# Patient Record
Sex: MALE | Race: WHITE
[De-identification: names, ages, dates, MRNs, and addresses within clinical notes are randomized per-mention and may not be internally consistent; named-entity substitution may affect disease eponyms.]

---

## 2018-09-27 ENCOUNTER — HOSPITAL ENCOUNTER (EMERGENCY)
Dept: HOSPITAL 46 - ED | Age: 68
Discharge: HOME | End: 2018-09-27
Payer: MEDICARE

## 2018-09-27 VITALS — HEIGHT: 69 IN | WEIGHT: 175 LBS | BODY MASS INDEX: 25.92 KG/M2

## 2018-09-27 DIAGNOSIS — E86.0: ICD-10-CM

## 2018-09-27 DIAGNOSIS — E11.65: Primary | ICD-10-CM

## 2018-09-27 DIAGNOSIS — Z79.899: ICD-10-CM

## 2018-09-27 NOTE — EKG
[facility]
                                    [address]
                                  [place]  [address]
_________________________________________________________________________________________
                                                                 Signed   
 
 
Normal sinus rhythm
Prolonged QT
Abnormal ECG
No previous ECGs available
Confirmed by ELLIOT BANG MD ([id number]) on 9/27/2018 2:27:28 PM
 
 
 
 
 
 
 
 
 
 
 
 
 
 
 
 
 
 
 
 
 
 
 
 
 
 
 
 
 
 
 
 
 
 
 
 
 
 
 
 
    Electronically Signed By: ELLIOT BANG MD  09/27/18 1427
_________________________________________________________________________________________
PATIENT NAME:     ZEUS GRANADO              
MEDICAL RECORD #: [id number]                     Electrocardiogram             
          ACCT #: [id number]  
DATE OF BIRTH:   [birth year]                                       
PHYSICIAN:   ELLIOT BANG MD           REPORT #: [id number]
REPORT IS CONFIDENTIAL AND NOT TO BE RELEASED WITHOUT AUTHORIZATION

## 2018-10-04 ENCOUNTER — HOSPITAL ENCOUNTER (EMERGENCY)
Dept: HOSPITAL 46 - ED | Age: 68
Discharge: HOME | End: 2018-10-04
Payer: MEDICARE

## 2018-10-04 VITALS — BODY MASS INDEX: 25.92 KG/M2 | HEIGHT: 69 IN | WEIGHT: 175 LBS

## 2018-10-04 DIAGNOSIS — R03.1: Primary | ICD-10-CM

## 2018-10-04 DIAGNOSIS — I10: ICD-10-CM

## 2018-10-04 DIAGNOSIS — Z79.82: ICD-10-CM

## 2018-10-04 DIAGNOSIS — Z79.899: ICD-10-CM

## 2018-10-04 DIAGNOSIS — Z87.891: ICD-10-CM

## 2018-10-04 PROCEDURE — G0480 DRUG TEST DEF 1-7 CLASSES: HCPCS

## 2018-10-04 NOTE — XMS
PreManage Notification: ZEUS GRANADO MRN:[id number]
 
Security Information
 
Security Events
No recent Security Events currently on file
 
 
 
CRITERIA MET
------------
- [facility] - 2 Visits in 30 Days
 
 
CARE PROVIDERS
-------------------------------------------------------------------------------------
LORE WELCH     Student in an Organized Health Care             09/27/2018-Current
                   Education/Training Program
 
PHONE: Unknown
-------------------------------------------------------------------------------------
 
Jordana has no Care Guidelines for this patient.
Care History
Medical/Surgical
09/27/2018    [facility]
 
      - Patient is currently established with [facility]. If patient is seen 
      in the ED during business hours. Please contact CHWs at [facility].
      - PATIENT CURRENTLY HAS [place] INSURANCE AND IS RECEIVING REFILLS ON HIS 
      MEDICATIONS FROM THE PHARMACY IN [place]. FAXED [phone].
      - PATIENT WOULD BE OVER INCOME FOR MEDICAID BENEFITS ONLY ELIGIBLE FOR 
      MEDICARE PART B HELP. SECONDARY INSURANCE WOULD HAVE TO BE REVIEWED WITH [employer]
      AND PATIENT WOULD HAVE TO SIGN UP FOR THE INSURANCE! PATIENT HAS BEEN MADE
      AWARE OF THIS AND IS TO BE IN CONTACT WITH HIS  AT [facility] OFFICE.
      Care Recommendation: This patient has had 5 or more Emergency Department
      visits in the last 12 months.\T\nbsp; Patient requires education on the scope
      and purpose of the ED as an acute care provider not a Primary Care Provider
      and should not be utilized for chronic conditions.\T\nbsp; These are
      guidelines and the provider should exercise clinical judgment when providing
      care.
E.D. VISIT COUNT (12 MO.)
-------------------------------------------------------------------------------------
2 [facility]
-------------------------------------------------------------------------------------
TOTAL 2
-------------------------------------------------------------------------------------
NOTE: Visits indicate total known visits.
 
ED/UCC VISIT TRACKING (12 MO.)
-------------------------------------------------------------------------------------
10/04/2018 17:28
[facility] [place]
 
TYPE: Emergency
 
COMPLAINT:
- CONFUSED
-------------------------------------------------------------------------------------
 
09/27/2018 10:01
[facility] [place]
 
TYPE: Emergency
 
COMPLAINT:
- LOW BLOOD PRESSURE
 
DIAGNOSES:
- Dehydration
- Other long term (current) drug therapy
- Nonspecific low blood-pressure reading
- Type 2 diabetes mellitus with hyperglycemia
-------------------------------------------------------------------------------------
 
 
INPATIENT VISIT TRACKING (12 MO.)
No inpatient visits to display in this time frame
 
[email or web address]

## 2019-10-16 NOTE — EKG
[facility]
                                    [address]
                                  [place]  [address]
_________________________________________________________________________________________
                                                                 Signed   
 
 
Ventricular-paced rhythm
Abnormal ECG
When compared with ECG of 27-SEP-2018 10:21,
Electronic ventricular pacemaker has replaced Sinus rhythm
Confirmed by ELLIOT BANG MD ([id number]) on 10/16/2019 7:16:12 PM
 
 
 
 
 
 
 
 
 
 
 
 
 
 
 
 
 
 
 
 
 
 
 
 
 
 
 
 
 
 
 
 
 
 
 
 
 
 
 
 
    Electronically Signed By: ELLIOT BANG MD  10/16/19 1916
_________________________________________________________________________________________
PATIENT NAME:     ZEUS GRANADO KEYA              
MEDICAL RECORD #: [id number]                     Electrocardiogram             
          ACCT #: [id number]  
DATE OF BIRTH:   [birth year]                                       
PHYSICIAN:   ELLIOT BANG MD           REPORT #: [id number]
REPORT IS CONFIDENTIAL AND NOT TO BE RELEASED WITHOUT AUTHORIZATION

## 2019-10-16 NOTE — NUR
PT REPORT RECIEVED, CARE ASSUMED AT THIS TIME. PT HAS INSULIN DRIP AND IV
FLUIDS INFUSING. REPORTS NERVE PAIN IN BOTH LEGS. DISCUSSED PLAN OF CARE FOR
THE NIGHT. ASSISTED PT WITH USE OF URINAL IN BED. PT IS ALERT AND ORIENTED AT
THIS TIME. UNDERSTANDS USE OF CALL LIGHT. BED ALARM ON FOR SAFETY.

## 2019-10-17 NOTE — NUR
PT REPORTS DINNER ARRIVED COLD, KITCHEN NOTIFIED AND AGREE TO SEND NE DINNER
TRAY. FRESH ICE WATER IN REACH. PT DNEIES FURTHER NEEDS OR CONCERNS.

## 2019-10-17 NOTE — NUR
DINNER TRAY ARRIVED, CALL LIGHT AND H2O IN REACH. PT SITTING UP EATING DINNER.
NO OTHER NEEDS OR CONCERNS VOICED.

## 2019-10-17 NOTE — NUR
PATIENT BLOOD SUGAR , 15 MINUTES AFTER D50 WAS GIVEN. DR BANG NOTIFIED
VIA TELEPHONE. ORDERS RECEIVED ( VIEW EMAR). WILL CONTINUE TO ASSESS AND
MONITOR.

## 2019-10-17 NOTE — NUR
PT C/O FEELING DIAPHORETIC. BG CHECKED, 40. 50ML D50 GIVEN PER MAR. NUNU JACQUES
NOTIFIED. WILL CONT TO MONITOR AND ASSESS

## 2019-10-17 NOTE — NUR
PATIENT HAS VOIDED ONCE PER URINAL IN BED AND HAD PASTY FOUL SMELLING BM'S X2
SINCE SHIFT STARTED AT 1900. PATIENT HAVING NO PAIN IV'S SL AND WNL. PM MEDS
GIVEN, NEW ATTENDS ON. CALL LIGHT IN REACH.

## 2019-10-17 NOTE — NUR
PT SITTING UP IN BED EATING BREAKFAST AND TALKING WITH ENVIRONMENTAL SERVICES
PERSONELL. PT REPORTS HE DOES NOT WANT TO GET UP TO THE CHAIR FOR BREAKFAST,
PREFERS TO EAT IN BED. PT IV SITES ARE INTACT, NO REDNESS OR SWELLING NOTED,
FLUIDS AND FLUSHES INFUSE EASILY. PT DENIES NAUSEA, PAIN, AND SOB AT THIS
TIME.

## 2019-10-17 NOTE — NUR
PT RESTING IN SEMIFOWLERS POSITION IN BED. PT ALERT AND ORIENTED. CALL LIGHT
AND H2O IN REACH. ASSESSMENT COMPLETED. PT EATING SMALL FRUIT PLATE/COTTAGE
CHEESE SNACK AND DENIES FURTHER NEEDS OR CONCERNS.

## 2019-10-17 NOTE — NUR
IN TO ASSESS PATIENT. STATES FEELING LESS SHAKEY. ALERT AND ORIENTED X 4. ABLE
TO ANSWER QUESTIONS APPROPRIATELY. ASSISTED WITH REPOSITIONING IN STRETCHER,
CALL LIGHT WITHIN REACH, NO FURTHER NEEDS AT THIS TIMW WILL CONTINUE TO
MONITOR.
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
## 2019-10-17 NOTE — NUR
AFTER I DID HIS 1800 VITALS HE NEEDED TO USE THE BED PAN. AFTER HE WAS DONE I
CHANGED HIS OLD TAPED ATTEND AND PUT A CLEAN ONE ON. I ASKED THE NURSING
STUDENT TO PLEASE BRING ME TWO GLASSES OF CRANBERRY JUICE.

## 2019-10-17 NOTE — NUR
SPOKE WITH PATIENT IN ROOM.  WHEN TALKING WITH PATIENT HE INTERRUPTED MY
QUESTIONS THREE TIMES STATING "I CAN LEAVE ANYTIME, I DON'T HAVE TO STAY IF
THE DR DOESN'T LET ME GO".  TRIED TO ADDRESS THIS, BUT PATIENT KEPT SAYING IT.
DR BANG CAME INTO THE ROOM SO I LEFT FOR HIS EXAM.  WILL RETURN ANOTHER TIME.

## 2019-10-17 NOTE — NUR
IV SITES INTACT, NO SWELLING OR REDNESS NOTED, PT DENIES PAIN WITH FLUSH. PT
IS ALERT AND ORIENTED X4, VITALS ARE WNL. PT HAS A VISITIOR AT THE BEDSIDE.

## 2019-10-17 NOTE — NUR
PATIENT RESTING IN BED WATCHIING TV. GIVEN NEW ICE WATER. AND SOME SUGAR FREE
ICE CREAM. PATIENT HAD NO OTHER NEEDS AND HIS CALL LIGHT IS IN REACH.

## 2019-10-17 NOTE — NUR
PT RESTING IN BED, SAWYER GORDON REQUESTED I CHECK ON PT. HAD QUITE A DISCUSSION
WITH PT, HIS FAMILY, PERSONAL MEDICAL ISSUES AND THAT HE IS NOT STAYING ANY
LONGER THAT HE FEELS HE SHOULD. PT REQUESTED PRAYER, WILL FOLLOW AS NEEDED

## 2019-10-17 NOTE — NUR
PATIENT NOW ON MED SURG.  HE WAS HUNGRY ASKING FOR A SNACK AT 3:20 PM.  I
HELPED HIM ORDER A SNACK SINCE HE CANNOT SEE THE MENU.  HE HAS NO TEETH.
COTTAGE CHEESE AND FRUIT PLATE ORDERED BY NURSE AFTER ASKING IF PATIENT COULD
HAVE A SNACK DUE TO BEING DIABETIC.  I ALSO HELPED PATIENT ORDER HIS DINNER
TONIGHT AND TOMORROW'S BREAKFAST.  60 GRAM CONSISTENT CARB DIET.  STATES HE
DOESN'T EAT MUCH BREAD AND EATS BROWN RICE, SO HE HAS SOME KNOWLEDGE ABOUT
EATING TO MANAGE DIABETES.  ADMISSION SCREENING NOTES HE HAS HAD WEIGHT LOSS.
MAY NEED MORE HELP AT HOME OR NEED AN ASSISTED LIVING FACILITY SINCE HE IS
WHEELCHAIR BOUND AND LIVES ALONE.  WILL CONTINUE TO MONITOR.

## 2019-10-18 NOTE — NUR
PT EDUCATED ON INCREASING ROM AND EXERCISING TO MAINTAIN AND IMPROVE STRENGTH.
PT VERBALIZED UNDERSTANDING AND STATES HE HAS BEEN WORKING WITH P.T. AND
WILL CONTINUE EXERCISES AT HOME AS INSTRUCTED PER PHYSICAL THERAPY. CALL LIGHT
AND H2O IN REACH. IV'S REMOVED CATH INTACT, COBAN AND GUAZE APPLIED AND VS'S
COMPLETED AND STABLE. NO FURTHER NEEDS OR CONERNS VOICED.

## 2019-10-18 NOTE — NUR
PATIENT JUST ON THE BED BRAVO ASSISTED BY ROSS FERNANDEZ AND HAD A SMALL AMOUNT OF
STOOL. IN BED WATCHING TV AT THIS TIME. CALL LIGHT IN REACH.

## 2019-10-18 NOTE — NUR
PT RESTING SUPINE IN BED EYES CLOSED AND RESPIRATIONS EVEN AND UNLABORED. CALL
LIGHT AND H2O IN REACH. PT APPEARS TO BE SLEEPING COMFORTABLY. REPORT
RECEIVED FROM SAWYER SOTO.

## 2019-10-18 NOTE — NUR
LEATHA AND I MARYANA HIM FROM THE BED TO THE SHOWER CHAIR. WE GAVE HIM A
SHOWER. CHANGED HIS BED LINENS.

## 2019-10-18 NOTE — NUR
PATIENT HAS REMAINED AWAKE ALL NIGHT WATCHING TV AND ASKING FOR SNACKS,
PATIENT HAS HAD A FREQUENT MORA OF THE BED BAN FOR AN URGENT FEELING OF NEEDING
TO HAVE A BM AND FOR USING THE URINAL. PATIENT'S WHEELCHAIR WAS DELIVERD TO
HIS ROOM LAST NIGHT BY HIS FRIENDS AND HE PLANS TO LEAVE TODAY IF THE MD
THINKS HE IS READY. HE SAYS HE WILL LEAVE AMA IF THE MD DOS NOT DC HIM TODAY.
CALL LIGHT IN REACH AND PATIENT CONTINUES TO WATCH TV.

## 2019-12-03 NOTE — EKG
[facility]
                                    [address]
                                  [place]  [address]
_________________________________________________________________________________________
                                                                 Signed   
 
 
Ventricular-paced rhythm
Abnormal ECG
When compared with ECG of 16-OCT-2019 14:10,
Vent. rate has increased BY  10 BPM
Confirmed by CAROLINA VARGAS DO ([id number]) on 12/3/2019 7:46:08 PM
 
 
 
 
 
 
 
 
 
 
 
 
 
 
 
 
 
 
 
 
 
 
 
 
 
 
 
 
 
 
 
 
 
 
 
 
 
 
 
 
    Electronically Signed By: CAROLINA VARGAS DO  12/03/19 1946
_________________________________________________________________________________________
PATIENT NAME:     ZEUS GRANADO              
MEDICAL RECORD #: [id number]                     Electrocardiogram             
          ACCT #: [id number]  
DATE OF BIRTH:   [birth year]                                       
PHYSICIAN:   CAROLINA VARGAS DO                     REPORT #: [id number]
REPORT IS CONFIDENTIAL AND NOT TO BE RELEASED WITHOUT AUTHORIZATION
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
## 2019-12-03 NOTE — NUR
PATIENT'S DAUGHTER IN THE ROOM. PATIENT PROVIDED VERAL CONSENT FOR PICTURES TO
BE TAKEN OF WOUNDS ON LEFT FOOT. WOUNDS CLEANED AND XEROFORM APPLIED AND
COVERED WITH GAUZE. OPEN AREA ON UMBILICUS CLEANED AND COVERED WITH BACITRACIN
AND A BANDAID. OPEN AREAS ON PATIENTS HEAD CLEANED. RED AREAS IN GROIN AND
BUTTOCK CREVICE CLEANED AND BARRIER CREAM APPLIED. PATIENT TOLERATED WOUND
CARE WELL.

## 2019-12-03 NOTE — NUR
69 YR OLD MALE PATIENT ADMITTED TO CCU FROM ED VIA STRETCHER UNDER DR. VARGAS
WITH DX OF GI BLEED. ELEVATED TRIPONIN. HX OF DM.  UPON ADMIT PATIENT VOMITED
LARGE AMT OF DARK STOMACH CONTENTS. ADMISSION PROCESS STARTED AFTER BED LINEN
CHANGED.

## 2019-12-03 NOTE — NUR
PATIENT ORIENTED BUT DROWSEY. MOANING AND CUSSING CONSISITENTLY WHILE IN BED.
REPORTS "NERVE PAIN" IN HIS LEGS. STATES "THEY ALWAYS HURT" AND DOES NOT DO
ANYTHING SPECIFIC FOR THE PAIN AT HOME. SCDs REMOVED, PATIENT UNABLE TO
TOLERATE THEM. MD AWARE. PATIENT VS STABLE. -110 AT REST. PATIENT HAS
PERSISTENT NAUSEA, NO EMESIS. PATIENT TAKING SIPS OF WATER. PRN ZOFRAN PER
ORDER. DISCUSSED WITH MD, ORDERS FOR SECONDARY PRN FOR NAUSEA RECEIVED.
PATIENT UP TO [facility]. TRANSFERS FROM BED TO BSC WITH MINIMAL ASSIST. CONTINUES TO
BE UNSTEADY. ABLE TO VOID AND RETURNED TO BED. ALLOWED TO REST AT THIS TIME.

## 2019-12-03 NOTE — NUR
PATIENT REPORTING ONGOING NAUSEA AND HAD SOME DRY HEAVES. PATIENT IS RESTLESS
AND AGITATED. ENCOURAGED PATIENT TO REST. PATIENT REQUESTING TRAZADONE. PRN
PHENERGAN ALSO PROVIDED, INFUSED OVER 10MIN DILUTED IN 20ML NS. IV SITE WNL.
PATIENT ATTEMPTS TO VOID IN URNAL AND IS UNABLE TO.

## 2019-12-03 NOTE — NUR
PATIENT REQUEST TO USE THE URNAL. ASSISTED PATIENT UP TO BEDSIDE. PATIENT
UNABLE TO VOID. IS UNSTEADY AND DROWSEY. SPEECH IS SLURRED. ENCOURAGED PATIENT
BACK INTO BED. WARM BLANKET PER REQUEST. IV FLUIDS PER ORDER, SITE WNL.

## 2019-12-04 NOTE — NUR
12/04/19 1011 Sheets,Harini
0920 PT ARRIVED TO PACU PT SLIGHTLY REACTIVE TO PAINFUL STIMULI, JAW
THRUST NEEDED OFF AND ON TO MAINTAIN AIRWAY. O2 SAT 78% AND NC PLACED
AT 6L. O2 INCREASING. RESP RATE INCREASED.
 
0940 PT MAINTAINING OWN AIRWAY AND SLIGHTLY MORE REACTIVE AND MOVING
IN BED. BASELINE FROM CCU PER CRNA. NC PLACED AT 2L FOR TRANFER.
 
0945 REPORT GIVEN TO CCU RN. THREE RNS AT BEDSIDE.

## 2019-12-04 NOTE — NUR
UP TO COMMODE WITH TOTAL ASSIST. NO BM THEN TRANSFERRED TO CHAIR FOR CLEAR
LIQUIDS. FEED PATIENT CLEAR LIQUIDS.

## 2019-12-04 NOTE — NUR
PATIENT HAS BECOME A 1:1 DUE TO SEVERE RESTLESSNESS AND HALLUCINATIONS.
CIWA PERFORMED, SCORE 25.  NOTIFIED. CIWA PROTOCOL ORDERED.

## 2019-12-04 NOTE — NUR
PT PICKING AT IV SITE AND TALKING TO SELF. RN IN ROOM TO ASSESS PT. PT
DISORIENTED, PT CHANGES CONVERSATION FROM BREAKFAST FOOD TO WINE, AND THEN
STARTS TALKING ABOUT ROOMS FOR RENT. PT DISORIENTED TO DATE AND EVENT. DENIES
HALLUCINATIONS. IV SITE SECURED AND PT REORIENTED. CALL LIGHT WITHIN REACH.

## 2019-12-04 NOTE — NUR
PATIENT CONTINUES TO BE RESTLESS, DISORIENTED, AND HALLUCINATIONS. 1:1 NR AT
BEDSIDE. CIWA SCORE 18. 2MG ATIVAN PER PROTOCOL.

## 2019-12-04 NOTE — NUR
PATIENT HAS BEEN YELLING OUT FREQUENTLY. ATTEMPTING TO GET UP TO BSC. PATIENT
VERY UNSTABLE AFTER RECEIVEING TRAZADONE. PATIENT UNABLE TO VOID IN URNAL.
BLADDER SCAN FOR >200 MLS.
PATIENT AGITATED AND PULLING AT LINES. DIFFICULT TO REDIRECT. ENCOURAGED
PATIENT TO REST.

## 2019-12-04 NOTE — NUR
In for cm assessment.  Very difficult to get information from pt. as he keeps
his eyes shut and does not respond until question is repeated many times.
Daughter is coming in later and I will speak with her.

## 2019-12-04 NOTE — NUR
PATIENT MORE RELAXED. DIFFICULT TO OBTAIN CIWA. PATIENT ONLY MILDLY RESTLESS
AND APPEARS TO BE SLEEPING OFF AND ON. 1:1 FOR SAFETY AND TO PROTECT LINES. VS
STABLE.

## 2019-12-04 NOTE — NUR
PT CALLING OUT, RN IN ROOM TO ASSESS NEEDS. PT REQUESTING CHANNEL CHANGE.
EDUCATION ON CALL LIGHT USE.

## 2019-12-04 NOTE — NUR
PT CALLING OUT FOR BED BRAVO. RN IN ROOM AND BED PAN PLACED. PT UNABLE TO HAVE
BM. BED BRAVO REMOVED. DENIES OTHER NEEDS.

## 2019-12-04 NOTE — NUR
PT CALLING OUT, RN IN ROOM TO ASSESS. PT NOTED TO BE INCONTINENT OF BLACK SEMI
LIQUID STOOL. LINEN CHANGED, ATTEND PLACED, AND NEW GOWN APPLIED. PT CONTINUES
TO TALK TO SELF AND REMAINS DISORIENTED, REORIENTED. DENIES OTHER NEEDS. CALL
LIGHT WITHIN REACH.

## 2019-12-04 NOTE — NUR
PT IS SEEN FOR MD REFFERAL FOR PRESSURE INJURIES ON THE LEFT FOOT. THE LATERAL
HEEL WOUND MEASURES 4CM X 5.1CM X 0.1CM AND IS CLASSIFIED AS A STAGE 3
PRESSURE INJURY. THERE IS AN ISLAND OF HEALTHY TISSUE FROM 10-11 O'CLOCK
MEASURING 1CM X 2CM, THERE IS ALSO ADHERENT NECROTIC TISSUE  IN THE CENTER OF
THE WOUND BED AND AT 7 O'CLOCK. THE EDGES OF THE WOUND ARE PROLIFERATIVE WITH
NO UNDERMINING OR TUNNELING NOTED. THERE IS MINIMAL EXUDATE. THERE ARE NO
SIGNS OF INFECTION. THE PERIWOUND SKIN IS FLAKY, BUT INTACT. PT EXPERIENCES NO
PAIN WITH CLEANING/DEBRIDEMENT OF THE WOUND BED. THE AREA IS CLEANSED WITH
BOTH WOUND CLEANSER AND HIBICLENSE SCRUB BRUCH, WHICH REMOVD A LOT OF DEAD
TISSUE. A DEBRISOFT MITT IS ALSO USED TO CLEAN UP SOME OF THE NECROTIC TISSUE
IN THE WOUND BED. IODOSORB IS PUT ON THE NECROTIC TISSUE IN THE WOUND BED,
WHICH IS THEN COVERED WITH AN ADHESIVE FOAM, FOLLOWING THE APPLICATION OF SKIN
PREP.
 
THE LEFT BIG TOE IS AN UNSTAGEABLE PRESSUE INJURY WITH DRY, INTACT ESCHAR. IT
MEASURES 1.6CM X 1CM. THE EDGES ARE NON-PROLIFERATIVE AND NO SIGNS OF
UNDERMINING OR TUNNELING AT THIS TIME. THERE IS NO EXUDATE. THE PERIWOUND SKIN
IS FLAKY, BUT INTACT. NO SIGNS OF INFECTION. PT HAS NO PAIN WITH CARE.
MEDIHONEY ALGINATE IS PLACED ON THE NECROTIC TISSUE, WHICH IS THEN COVERED
WITH AN ADHESIVE FOAM.
 
THE LATERAL UMBILICUS MEASURES 0.9CM X 1.9CM X 0.1CM. IT IS A PARTIAL
THICKNESS WOUND, THE ETIOLOGY IS UNKNOWN, THE PT IS NOT HELPFUL WITH ANSWERING
QUESTIONS. THE BASE IS PALE PINK, WITH CLEAN NON-GRANULATING TISSUE. THE EDGES
ARE PROLIFERATIVE, NO UNDERMINING OR TUNNELING NOTED. THERE IS DRIED CRUSTY
EXUDATE ON THE PERIWOUND SKIN BEFORE CLEANSING. THE PERIWOUND SKIN IS INTACT.
NO PAIN WITH THIS WOUND EITHER. NO SIGNS OF INFECTION. COLLAGEN IS PLACED IN
THE WOUND BED, THEN COVERED WITH AN ADHESIVE FOAM.
 
WOUND CARE PLANS ARE WRITTEN OUT AND PLACED INTO PT'S CHART.

## 2019-12-04 NOTE — NUR
PT REPORTS HEARING AND SEEING DOGS IN ROOM. REPORTS "SEEING PEOPLE COMING AND
GOING." PT STATES, "I WOULD SURE LIKE SOME PINK DRINK, YOU KNOW EVELIO." PT
REPORTS NIGHTLY WINE USE. REPORTS "CRAVING ALCOHOL." MILD TREMOR NOTED. CIWA
COMPLETED AND NOTED TO BE 11. 2MG ATIVAN GIVEN. WILL CONTINUE TO MONITOR.

## 2019-12-04 NOTE — NUR
PATIENT IS VERY RESTLESS. DISORIENTED FRQUENTLY AND DIFFICULT TO REORIENT.
BLADDER SCAN >400. HEMPHILL PLACED. 550 MLS URINE DRAINED. PATIENT TRYING TO ROLL
OUT OF BED AND REQUIRES FREQUENT INTERVANTION. 1:1 FOR SAFETY.

## 2019-12-04 NOTE — NUR
REPORT RECIEVED. SITTER IS IN ROOM. PATIENT CALM. HEMPHILL CATH IS PATENT WITH
CLEAR YELLOW URINE. IVF PATENT.

## 2019-12-04 NOTE — NUR
PATIENT CONTINUES TO YELL FREQUENTLY. ATEMPTING TO PULL LINES AND GET OUT OF
BED. PATIENT DIFFICULT TO REDIRECT. CONTINUES TO BE UNABLE TO VOID. BLADDER
SCAN 280MLS. PATIENT UNWILLING TO STRAIGHT CATH AT THIS TIME. WILL CONTINUE TO
MONITOR. PRN ATIVAN PROVIDED.
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
## 2019-12-04 NOTE — NUR
UP TO COMMODE TO EXPELL SM LOOSE BLACK STOOL. STRONGER ON LEGS. MORE
INTERACTIVE WITH STAFF. BACK TO BED WITH ASSIST.

## 2019-12-04 NOTE — NUR
PATIENT ON CLEAR LIQUID DIET. BRIEF INTERVIEW THIS AFTERNOON.  HE USUALLY EATS
3 MEALS AT HOME: SMOOTHIE FOR BREAKFAST, MAYBE A FROZEN POTPIE FOR LUNCH, AND
STEAK AND STEAMED VEGGIES FOR DINNER.  DRINKS BLACK COFFEE, WATER, SOMETIMES
MILK.  NO ORAL SUPPLEMENTS AT HOME.  DOESN'T NORMALLY SNACK.  STATES HIS
WEIGHT HAS BEEN STABLE.  DOES HIS OWN GROCERY SHOPPING, TRIES TO GO ONCE A
WEEK.  WILL SEE HOW HE EATS HERE.  MAY NEED ENSURE HIGH PROTEIN (LOWER SUGAR
DRINK) FOR ADDED NUTRITION.  PATIENT HAD TO GO TO THE BATHROOM.

## 2019-12-04 NOTE — NUR
PT DISORIENTED TO DATE AND EVENT, INAPPROPRIATE RESPONSES AT TIMES, REQUESTING
"NIGHT SLEEP MED." PACED RHYTHM WITH HR 'S ON MONITOR. LUNG CLEAR IN
BUL, DIMINISHED IN BLL, NO COUGH, 96% ON ROOM AIR. BOWEL TONES ACTIVE X4,
DENIES NAUSEA. WOUND SITES ASSESSED, DRESSING C/D/I, WNL. HEMPHILL CATH IN PLACE,
SMALL AMOUNT PENILE DISCHARGE NOTED, CATH AND SAMMY CARE COMPLETED. IV SITE
FLUSHED AND PATENT. PM CARE COMPLETED. BLANKETS FROM WARMER. REINFORCED SAFETY
AND FALL PRECAUTIONS, PT UNRECEPTIVE. CALL LIGHT WITHIN REACH.

## 2019-12-04 NOTE — NUR
HAS BEEN SLEEPING WITH BIPAP IN PLACE. OFF NOW FOR BREAK, PT FEELS LIKE THE
BIPAP REALLY HELPED.BREATH TONES REMAIN VERY TIGHT WITH VERY FEW WHEEZES IN
UPPER LEFT POST REGION.
TAKING CLEAR LIQ WITHOUT PROBLEM. VOIDED PER URINAL.

## 2019-12-05 NOTE — NUR
DR VARGAS IN TO CHECK ON PATIENT. DISCUSSED PLAN OF CARE FOR EVENING. DR VARGAS
WOULD LIKE MINIMAL USE OF ATIVAN OVERNIGHT TO HELP PATIENT GET ON A REGULAR
WAKE/ SLEEP SCHEDULE.

## 2019-12-05 NOTE — NUR
PT IN ROOM PULLING OFF GOWN AND PULLING AT IV TUBING. ASSISTED WITH
REDIRECTION, REPOSITIONING, AND GAVE PT A PUDDING. PT NOW RESTING WITH EYES
CLOSED.

## 2019-12-05 NOTE — NUR
AWAKEN PT TO SEE IF HE HAD TO VOID YET AND NO HE DID NOT. PT SAID NO AND SHOOK
HIS HEAD NO. PT ATTENDS IS DRY AT THIS TIME.

## 2019-12-05 NOTE — NUR
PT ASLEEP AT PRESENT TIME. SAWYER SAMUELS REQUESTED I NOT DISTURB PT AT THIS TIME.
WILL FOLLOW AS NEEDED

## 2019-12-05 NOTE — NUR
PT INCOHERENTLY YELLING OUT. RN IN ROOM. PT DISORIENTED, AGGITATED, AND
RESTLESS. EASILY REASSURED. WILL CONTINUE TO MONITOR.

## 2019-12-05 NOTE — NUR
PT RESTLESS AND ATTEMPTING TO GET OUT OF BED STATING "I'M GOING SHOPPING."
DISORIENTED TO ALL. PT REMOVED GOWN AND ATTEMPTING TO EAT, REORIENTED. PT
PICKING AT SELF AND LINES. PT CALLING OUT INCOHERENT SPEECH. APPEARS AGGITATED
AND TREMORS NOTED. ADDITIONAL 4MG ATIVAN GIVEN.

## 2019-12-05 NOTE — NUR
PT CONSTANTLY YELLING OUT AND INCOHERENT. UNABLE TO SETTLE WITH THERAPEUTIC
COMMUNICATION. ATTEMPTING TO GET OUT OF BED. PULLING AT HEMPHILL AND LINES.
ADDITIONAL 4 MG ATIVAN GIVEN. WILL CONTINUE TO MONITOR.

## 2019-12-05 NOTE — NUR
DR PRESCOTT INTO SEE PT AT THIS TIME, LEFT BIG TOE DRESSING CHANGED WITH A DRAIN
SPONGE AND SMALL WRAP AND PAPER TAPE. THE LEFT HEEL DRESSING IS GOOD WITH THE
ALLEVENT AND THE BROWN SPOT ON THE RIGHT TOE IS GOOD LEFT TO AIR AT THIS TIME
DUE TO IT IS DRY.
PT SLEEP THRU THE DRESSING PROCESS WITH DR PRESCOTT.

## 2019-12-05 NOTE — NUR
PT ATTENDS IS CLEAN AND DRY AT THIS TIME. HE DOES PULL AGAINST STAFF WHEN
TRYING TO CHANGE HIS POSITION.

## 2019-12-05 NOTE — NUR
PT CONTIOUES TO SLEEP THIS AM, HE IS AROUSABLE WHEN YOU TOUCH HIS ARM. HE
MOANS AND THEN RETURNS TO SLEEP.

## 2019-12-05 NOTE — NUR
PT PULLING AT HEMPHILL AND LINES, UNABLE TO CALM, UNABLE TO FOLLOW COMMANDS. 2 MG
ATIVAN GIVEN. ASSESSMENT UNCHANGED FROM PREVIOUS.

## 2019-12-05 NOTE — NUR
PT AWAKEN FOR AM CARE, LINE CHANGE AND REPSOITIONED AT THIS TIME. LUNCH ORDER
AND MEDICATIONS GIVEN. PT ANSWERES QUESTION, IS SLOW TO RESPONDE, BUT DOES
FOLLOW DIRECTIONS. PT IS MAKING URINE, HEMPHILL DRAINING WELL.

## 2019-12-05 NOTE — NUR
IN ROOM FOR MEDICATION ADMINISTRATION AND TO ASSESS PATIENT. RESTING IN BED.
AWOKE LONG ENOUGH TO TAKE MEDICATION AND RESPOND TO QUESTIONS THEN FALLS BACK
TO SLEEP. PT DENIES PAIN OR NAUSEA. TWO PERSON ASSIST TO REPOSITION IN BED. IV
FLUIDS INFUSING, CALL LIGHT WITHIN REACH, NO FURTHER NEEDS AT THIS TIME.

## 2019-12-05 NOTE — NUR
PT YELLING OUT FREQUENTLY. DISORIENTED. UNABLE TO REORIENT. UNABLE TO FOLLOW
COMMANDS. THERAPEUTIC COMMUNICATION PROVIDED. WILL CONTINUE TO MONITOR.

## 2019-12-05 NOTE — NUR
DIABETIC EDUCATOR CAME BY THIS AFTERNOON TO SEE PT, BUT PT HAS BEEN SLEEPING
FOR MOST OF THE DAY AND WHEN AWAKE DOES NOT FOLLOW OR UNDERSTAND WHAT YOU ARE
TALKING ABOUT. SHE WILL COME BACK ON FRIDAY.

## 2019-12-05 NOTE — NUR
PT EX-WIFE IN THE ROOM AT THIS TIME, TALKING WITH HIM. PT WAS FEED LUNCH HE
DID NOT TAKE ANY EFFORT TO FEED SELF.

## 2019-12-05 NOTE — NUR
PT CALLING OUT, RN IN ROOM TO ASSESS. PT DISORIENTED, ATTEMPTING TO PUT GOWN
IN MOUTH, RESPONDS INAPROPRIATELY. PT ATTEMPTING TO GET OUT OF BED AND UNABLE
TO FOLLOW COMMANDS. STATES "I NEED A BAG MY STOMACH IS UPSET." TREMOR NOTED.
PT DIAPHORETIC. PT PICKING AT AIR. CIWA 14 AT THIS TIME. 4 MG ZOFRAN AND 2 MG
ATIVAN GIVEN. WILL CONTINUE TO MONITOR.

## 2019-12-05 NOTE — NUR
PT RESTING WITH EYES CLOSED. BREATHING EVEN AND UNLABORED r=22. IV FLUIDS
INFUSING. BED ALARM ON FOR SAFETY. WILL CONTINUE TO MONITOR.

## 2019-12-05 NOTE — NUR
LEFT HEEL DRESSING CHANGED DUE TO IT HAD COME OFF, ALSO PLACED HEEL PROCTORS
ON PT AT THIS TIME. PT THANKED STAFF FOR HELPING HIM EAT THIS EVENING. PT
STATES "THIS IS THE BEST FOOD THAT I HAVE HAD IN A LONG TIME" PT IS TRACKING
AT THIS TIME.

## 2019-12-06 NOTE — NUR
PATIENT HAS BEEN UP X3 NOW TO USE THE BATHROOM WITH NO RESULTS. UPDATED MD VARGAS THAT PATIENT HAS NOT VOIDED IN 5 HOURS NOW AND HAS TRIED AND IS UNABLE
TO VOID. PER MD VARGAS MAY STRAIGHT CATH PATIENT AND IF HE IS UNABLE TO VOID IN
THE NIGHT TONIGHT AFTER TRYING AGAIN PLACE HEMPHILL CATHETER. NO OTHER NEEDS AT
THIS TIME. WILL CONTINUE TO CLOSELY MONITOR.

## 2019-12-06 NOTE — OR
[facility]
                                    [address], [place]  [address]
_________________________________________________________________________________________
                                                                 Signed   
 
 
DATE OF OPERATION:
12/04/2019
 
SURGEON:
Keya Davis MD
 
PREOPERATIVE DIAGNOSES:
1. Coffee-grounds emesis and melena.
2. Multiple medical problems including diabetes out of control (hyperglycemic coma),
episodic methamphetamine use, hepatitis C, uncertainty regarding cirrhosis. 
 
POSTOPERATIVE DIAGNOSES:
1. 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
2. Small proximal gastric ulcer with visible vessel.
 
PROCEDURE:
Esophagogastroduodenoscopy with control of bleeding (hemoclip application of ulcer).
 
ANESTHESIA:
Propofol infusion (minimal); Roby Payne CRNA.
 
INDICATION:
This 69-year-old white man is a recent transplant from [place] to the [facility] in
the past year.  He has underlying diabetes, which was rather severe.  He has had
admission to the hospital for hyperosmolar coma in the past.  He was admitted to the
emergency room last night, having reported coffee-grounds emesis as well as melena over
several days.  His hematocrit at presentation was 31, now down to 26.  The patient
episodically uses methamphetamine.  He is known to have had history of hepatitis C, but
not certain on cirrhosis.  His INR is 1.3 and he has no ascites.  He is admitted to
undergo upper endoscopy to better characterize the problem of hematemesis and melena.
He understands the risks of bleeding, infection, and perforation. 
 
FINDINGS:
There is no sign of fresh blood within the esophagus, stomach, or duodenum.  There was
mild chronic inflammation of the esophagus and that of the stomach as well.  There was a
punched-out ulcer like lesion of the proximal stomach that had apparently of visible
vessel or clot.  This was secured with a hemoclip.  Biopsies were not obtained.  There
was no sign of other pathology, though a very subtle possible midesophageal grade 1
varix was noted. 
 
DESCRIPTION OF PROCEDURE:
The patient was brought to the endoscopy suite and given topical Hurricaine spray
 
    Electronically Signed By: KEYA DAVIS MD  12/06/19 1620
_________________________________________________________________________________________
PATIENT NAME:     ZEUS GRANADO              
MEDICAL RECORD #: [id number]            OPERATIVE REPORT              
          ACCT #: [id number]  
DATE OF BIRTH:   [birth year]            REPORT #: [id number]      
PHYSICIAN:        KEYA DAVIS MD                 
PCP:              LORE WELCH MD              
REPORT IS CONFIDENTIAL AND NOT TO BE RELEASED WITHOUT AUTHORIZATION
 
 
                                  [facility]
                                    [address], [place]  [address]
_________________________________________________________________________________________
                                                                 Signed   
 
 
hypopharyngeal anesthesia and placed in lateral decubitus position.  He was given
intravenous sedation with the assistance of the anesthetist based on his advanced ASA
class (4E).  He is edentulous.  After sedation with minimal amount of propofol (the
patient already had Ativan earlier today), an Olympus video upper endoscope was passed
in the hypopharynx.  The vocal cords appeared normal.  Scope was advanced to the
esophagus and passage down the esophagus showed no sign of blood or bleeding, though
there was one area that was suggestive of an early varix.  The scope was then advanced
in the stomach, which was insufflated with air.  There was some dark material, but no
sign of active bleeding or blood proper.  Irrigation was undertaken.  Stomach had a mild
inflammatory appearance.  The pylorus was normal.  Scope was passed through into the
duodenum, which was normal.  The scope was withdrawn carefully through the duodenal
channel showing no sign of ulceration.  Retroflexed view showed dark material in the
upper stomach.  The scope was manipulated and irrigated allowing for removal of a
tenacious dark material in the upper stomach identifying a small ulceration.  This
appeared to have a visible vessel or clot at the base.  This was secured with a
hemoclip.  Irrigation was undertaken further.  There was no sign of bleeding.  The scope
was carefully withdrawn through the esophagus, which showed inflammation.  I did not
observe leoncio immanuel varices upon withdrawal of scope.  He was then taken to recovery room
in good condition having suffered no complication. 
 
CONCLUDING DIAGNOSIS:
Most likely, his bleeding is related to a small proximal gastric ulcer.  Relationship to
his episodic methamphetamine use in etiology of the ulcer is acknowledged, but uncertain
at this time.  Recommend treating with Carafate and PPI medication.  He will return to
the ongoing care of Dr. Brown in the hospital. 
 
 
 
            ________________________________________
            MD HAIR Bailey/MODL
Job #:  [id number]
DD:  12/04/2019 09:29:48
DT:  12/04/2019 10:29:03
 
cc:            Carolina Brown MD 
 
               [facility]
 
 
 
    Electronically Signed By: KEYA DAVIS MD  12/06/19 1620
_________________________________________________________________________________________
PATIENT NAME:     ZEUS GRANADO              
MEDICAL RECORD #: [id number]            OPERATIVE REPORT              
          ACCT #: [id number]  
DATE OF BIRTH:   [birth year]            REPORT #: [id number]      
PHYSICIAN:        KEYA DAVIS MD                 
PCP:              LORE WELCH MD              
REPORT IS CONFIDENTIAL AND NOT TO BE RELEASED WITHOUT AUTHORIZATION
 
 
                                  [facility]
                                    [address], [place]  [address]
_________________________________________________________________________________________
                                                                 Signed   
 
 
Copies:  CAROLINA BROWN DO
~
 
 
 
 
 
 
 
 
 
 
 
 
 
 
 
 
 
 
 
 
 
 
 
 
 
 
 
 
 
 
 
 
 
 
 
 
 
 
 
 
 
    Electronically Signed By: KEYA DVAIS MD  12/06/19 1620
_________________________________________________________________________________________
PATIENT NAME:     ZEUS GRANADO              
MEDICAL RECORD #: [id number]            OPERATIVE REPORT              
          ACCT #: [id number]  
DATE OF BIRTH:   [birth year]            REPORT #: [id number]      
PHYSICIAN:        KEYA DAVIS MD                 
PCP:              LORE WELCH MD              
REPORT IS CONFIDENTIAL AND NOT TO BE RELEASED WITHOUT AUTHORIZATION

## 2019-12-06 NOTE — CONS
[facility]
                                    [address], [place]  [address]
_________________________________________________________________________________________
                                                                 Signed   
 
 
DATE OF CONSULTATION:
12/03/2019
 
CONSULTING PHYSICIAN:
Keya Davis MD
 
REQUESTING PHYSICIAN:
Carolina Brown MD
 
PROBLEM:
Possible melena and hematemesis.
 
HISTORY OF PRESENT ILLNESS:
This disheveled 69-year-old white man was evaluated in the emergency room by Dr. Lam
and subsequently admitted to the hospital by Dr. Brown with problems of diabetes out of
control including significant hyperglycemia at 600, known diabetes, but without insulin
treatment in the past few days.  Methamphetamine use and coincidental report of melena
as well as probable hematemesis.  The patient tells me he has had hematemesis in the
past and evaluation for it has been negative. 
 
The patient is here in [place] for the past year, previously in [facility]. 
 
The patient has methamphetamine use in the past 2 days and is considered to carry a
diagnosis of aortic stenosis, __________ which I am uncertain at this time. 
 
MEDICATIONS:
The patient does take aspirin enteric-coated on a daily basis, said to have Pepcid as
longstanding medication, uncertain if he has really been taking it.  Also, taking
lisinopril 5 mg daily, NovoLog insulin 10 units subcu t.i.d. with meals, Vistaril 25 mg
p.o. q.6 hours, Lasix 20 mg tablets 1/2 tab p.o. daily, famotidine 20 mg p.o. daily,
folic acid 1 mg daily, Cymbalta 30 mg p.o. daily, and vitamin D3. 
 
ALLERGIES:
He is considered to have no known drug allergies.   
 
The patient underwent an EKG as his troponin level was 0.06, results of which are
pending, but the patient has no complaints of chest pain.  He does have some complaints
of epigastric pain.  He denies any dysphagia.  He has what sounds like some
coffee-ground emesis and definitely has been thought to have tarry black stool. 
 
SOCIAL HISTORY:
He lives in [place].  He has a woman, who lives in his house, which he does not hold
in high regard and wants her and others within the house hold out of there, he says.
The patient is from [facility] as previously noted. 
 
    Electronically Signed By: KEYA DAVIS MD  12/06/19 1620
_________________________________________________________________________________________
PATIENT NAME:     ZEUS GRANADO              
MEDICAL RECORD #: [id number]            CONSULTATION                  
          ACCT #: [id number]  
DATE OF BIRTH:   [birth year]            REPORT #: [id number]      
PHYSICIAN:        KEYA DAVIS MD                 
PCP:              LORE WELCH MD              
REPORT IS CONFIDENTIAL AND NOT TO BE RELEASED WITHOUT AUTHORIZATION
 
 
                                  [facility]
                                    [address], [place]  [address]
_________________________________________________________________________________________
                                                                 Signed   
 
 
 
REVIEW OF SYSTEMS:
Denies any chest pain though he has had some epigastric pain.  He has had no back pain
per se.  His appetite has been poor __________ and it is unreliable as to the extent to
which he has been eating and definitely unlikely to have been taking his usual
medications. 
 
PHYSICAL EXAMINATION:
GENERAL:  A disheveled white man looking far older than stated age of 69.   
NECK:  Trachea is midline.  There is no carotid bruit. 
CHEST:  Clear. 
HEART:  Regular without murmur. 
ABDOMEN:  Nondistended and soft.  There is no palpable mass.  No ascites.  No
telangiectasias. 
EXTREMITIES:  Lower extremities show excoriated lesions, likely self induced.
 
LABORATORY STUDIES:
Show a white count of 5.7, hematocrit 31.1, platelets 131,000.  Toxicology shows
negative acetones.  Urinalysis is pending.  Chem profile shows normal electrolytes
except for sodium 131, glucose was elevated at 666, calcium 7.7.  Liver enzymes normal.
Troponin initially 0.44, subsequently 0.049.  His albumin is low at 2.6.  Thyroid
studies are pending.  ABG shows a pH of 7.38, CO2 49.8, O2 24, bicarb 28.8, O2
saturation 35.5%.  This is considered a venous blood gas. 
 
ASSESSMENT AND PLAN:
I have been consulted for consideration of upper endoscopy on the basis of his
hematemesis and melena, which is a presumed diagnosis actually.  His hematocrit is
decreased to 31.1, and on that basis he may well have had some bleed bleeding.  He has
numerous other medical problems, all of which require attention and particularly the
hyperosmolar coma with glucose of 666. 
 
The patient is admitted to the intensive care unit for further management.  I discussed
with him the recommendation for upper endoscopy be performed tomorrow.  If he should
"cut loose" with bleeding, it certainly can be done sooner.  The risks of bleeding,
infection, and perforation related to upper endoscopy were reviewed with him.  He
understands and wishes to proceed.  He can have liquids tonight.  N.p.o. after midnight
anticipating upper endoscopy tomorrow.  Whether or not he will need propofol infusional
sedation remains to be determined. 
 
 
 
            ________________________________________
            Keya Davis MD 
 
 
    Electronically Signed By: KEYA DAVIS MD  12/06/19 4790
_________________________________________________________________________________________
PATIENT NAME:     ZEUS GRANADO              
MEDICAL RECORD #: [id number]            CONSULTATION                  
          ACCT #: [id number]  
DATE OF BIRTH:   [birth year]            REPORT #: [id number]      
PHYSICIAN:        KEYA DAVIS MD                 
PCP:              LORE WELCH MD              
REPORT IS CONFIDENTIAL AND NOT TO BE RELEASED WITHOUT AUTHORIZATION
 
 
                                  [address] [place]  [address]
_________________________________________________________________________________________
                                                                 Signed   
 
 
 
/[facility]
Job #:  [id number]
DD:  12/03/2019 18:18:59
DT:  12/03/2019 21:38:36
 
cc:            Carolina Brown MD
 
 
Copies:  CAROLINA BROWN DO
~
 
 
 
 
 
 
 
 
 
 
 
 
 
 
 
 
 
 
 
 
 
 
 
 
 
 
 
 
 
 
 
 
 
 
    Electronically Signed By: KEYA DAVIS MD  12/06/19 1620
_________________________________________________________________________________________
PATIENT NAME:     ZEUS GRANADO              
MEDICAL RECORD #: [id number]            CONSULTATION                  
          ACCT #: [id number]  
DATE OF BIRTH:   [birth year]            REPORT #: [id number]      
PHYSICIAN:        KEYA DAVIS MD                 
PCP:              LORE WELCH MD              
REPORT IS CONFIDENTIAL AND NOT TO BE RELEASED WITHOUT AUTHORIZATION

## 2019-12-06 NOTE — NUR
PATIENT SHIFT REPORT RECIEVED FROM NIGHT SHIFT RN. PATIENT RESTING IN BED AND
CALLED STAFF TO GET OUT OF BED. PER REPORT PATIENT IS CLEARING MORE AS THE
NIGHT GOES. WILL CONTINUE TO CLOSELY MONITOR.

## 2019-12-06 NOTE — NUR
PT REPORT RECIEVED FROM CCU RN. CARE ASSUMED AT THIS TIME. PT RESTING WITH
EYES CLOSED. BREATHING EVEN AND UNLABORED. CALL LIGHT MARY ANNE ESTEVEZ. BED ALARM
ON FOR SAFETY.

## 2019-12-06 NOTE — NUR
PATIENT FINISHED HIS DINNER AND UPDATED ON NEEDING TO STRAIGHT CATH IF PATIENT
CAN NOT VOID. PATIENT STATED I STILL CANT PEE. STRAIGHT CATHED FOR 375MLS
OUTPUT. PATIENT DENIES ANY OTHER NEEDS AT THIS TIME. WILL CONTINUE TO CLOSELY
MONITOR.

## 2019-12-06 NOTE — NUR
WHILE EATING BREAKFAST, PATIENT NOTED TO PUSH TRAY OFF OF HIS TRAY TABLE AND
THROW ALL OF HIS FOOD TOWARDS THE DOOR/GARBAGE. AFTER THIS, PATIENT STATES, "I
NEED MY SAUSAGE, GET ME MORE SAUSAGE." EXPLAINED TO PATIENT THAT HE HAD JUST
THROWN HIS ENTIRE TRAY DOWN ONTO THE FLOOR WITH ALL OF HIS FOOD. PT HAD EATEN
ABOUT 25% OF HIS MEAL PRIOR TO DOING THIS. PT NOW ASKING FOR HIS HEAD OF BED
TO BE LOWERED. PT LOOKS OUT WINDOW AND STATES, "THERE'S SOMEONE OUT THERE
PHEASANT HUNTING, AND I SHOULD BE OUT THERE HUNTING TOO GOD DAMN IT."

## 2019-12-06 NOTE — NUR
PATIENTS DINNER ORDERED. PATIENTS DAUGHTER IN TO SEE PATIENT. UPDATED ON PLAN
OF CARE. PATIENT HAS REMAINED MORE CLEAR THIS AFTERNOON IN COGNITION. NO OTHER
NEEDS AT THIS TIME. ALL BEDDING WAS CHANGED. WARM BLANKET PROVIDED PER
REQUEST. WILL CONTINUE TO CLOSELY MONITOR.

## 2019-12-06 NOTE — NUR
PATIENT RESTING IN BED AND CALLED OUT FOR STAFF ASKING FOR HIS PILLOW TO BED
MOVED. PATIENT UNABLE TRYING TO GET UP BUT PATIENT UNABLE TO VERBALIZE WHAT HE
NEEDS. OFFERED THE PATIENT THE CAMMODE AND PATIENT SAID "YES I NEED TO GO". 2
RN IN TO ASSIST PATIENT UP TO THE CAMMODE. PATIENT URINATEED AND ASSISTED BACK
TO BED. NO OTHER NEEDS AT THIS TIME. BED ALARM ON FOR SAFETY AND BEDDING WAS
CHANGED. WILL CONTINUE TO CLOSELY MONITOR.

## 2019-12-06 NOTE — NUR
PATIENT HAS REMAINED MORE CLEAR THIS AFTERNOON. PATIENT IS ALERT TO PLACE.
PATIENT CALLS APPROPRIATELY. PATIENT HAS BEEN UP SEVERAL TIMES TO [facility] AND
WORKED WITH PHYSICAL THERAPY. PATIENT TOLERATED WELL. NO OTHER NEEDS AT THIS
TIME. WILL CONTINUE TO CLOSELY MONITOR.

## 2019-12-06 NOTE — NUR
PATIENT RESTING AT THIS TIME. BED ALARM IN PLACE DUE TO PATIENT BEING CONFUSED
AND FOR HIS SAFETY. WILL CONTINUE TO CLOSELY MONITOR.

## 2019-12-06 NOTE — NUR
PT STILL UNABLE TO VOID. ASSISTED PT TO THE BSC, BUT PT STILL UNABLE TO
URINATE. STRAIGHT CATHED PT  CC. WELL TOLERATED BY PATIENT. ASSESSMENT
COMPLETED AT THIS TIME. PT COOPERATIVE AND TALKATIVE. MORE AWAKE THAN HE HAS
BEEN THROUGHOUT THE NIGHT.PT ORIENTED TO SELF, PLACE, AND EVENT, AND STATES HE
IS FEELING BETTER. MODERATE AMOUNT OF DRAINAGE FROM LEFT HEEL, BEDDING CHANGED
AND DRESSING REPLACED. CALL LIGHT WITHIN REACH. NO FURTHER NEEDS AT THIS TIME.

## 2019-12-06 NOTE — NUR
PATIENT CALLED AND WAS ASSISTED UP TO THE BEDSIDE CAMMODE. PATIENT IS A 2
PERSON STAND-BY TO STAND. PATIENT HAD SMALL BM AND URINATED. PROVIDED PATIENT
WITH BED BATH AT THIS TIME. ALL BEDDING CHANGED AND NEW GOWN ON. PATIENT IS
ALERT TO PLACE AND SITUATION AT THIS TIME. LUNCH ORDERED. WILL CONTINUE TO
CLOSELY MONITOR. NO OTHER NEEDS AT THIS TIME.

## 2019-12-06 NOTE — NUR
IN ROOM TO ASSESS PATIENT. UP TO BSC WITH FWW AND ONE PERSON ASSIST. PT ABLE
TO URINATE AT THIS TIME. ORAL CARE, SKIN CARE, AND BED LINEN CHANGE COMPLETED.
PT DISORIENTED TO TIME AND LOCATION. ASKS ABOUT GOING TO SCHOOL. ABLE TO
REORIENT PATIENT TO SITUATION. PT RESTING IN BED AT THIS TIME. BED ALARM ON
FOR SAFETY, CALL LIGHT WITHIN REACH. NO FURTHER NEEDS AT THIS TIME.

## 2019-12-06 NOTE — NUR
pt SWEARING, IN TO CHECK ON HIM. REPORTED "I'M COLD" OFFERED A BLANKET "WE
SHOULD JUST GET UP AND DO SOMETHING" REFUSED TO SIT IN CHAIR "I WANT TO SIT IN
MY LITTLE CHAIR." REMINISCED ABOUT CHILDHOOD AND SON FOR SEVERAL MINUTES.
NO FURTHER REQUESTS AT THIS TIME. CALL LIGHT WITHIN REACH. CURTAIN OPEN TO
NURSES STATION.

## 2019-12-06 NOTE — NUR
PATIENT RESTING IN BED AT THIS TIME. PATIENT IS ALERT TO SELF ONLY. ASKED
PATIENT THE YEAR AND PATIENT STATES "2015". PATIENT ASKS STAFF "WHAT GRADE ARE
YOU IN IN COLLEGE". UPDATED PATIENT I AM GRADUATED. PATIENT LATER ASKED STAFF
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 RE-ORIENTED PATIENT THAT HE IS IN THE
HOSPITAL. PATIENT IN BED AT THIS TIME AND BREAKFAST ORDERED. WILL REPOSITION
AS NEEDED. PATIENT DOES MOVE HIS SELF AT TIMES. WILL CONTINUE TO CLOSELY
MONITOR.

## 2019-12-06 NOTE — NUR
pt MOANING. COVERS OFF, COMPLAINED OF BEING COLD. COVERS REARRANGED. LIGHTS
OFF, ENCOURAGED SLEEP AT THIS TIME. CURTAIN OPEN TO NURSES STATION.

## 2019-12-06 NOTE — NUR
IN TO ASSESS PATIENT, HAS NOT VOIDED SINCE 1900. BLADDER SCAN  MLS. PT
TRIED TO VOID INTO URINAL BUT WAS UNABLE. PT REFUSED TO GET UP TO SIDE OF BED
OR BEDSIDE COMMODE. PT SPEAKING INCOHERENT SENTENCES AND VERY SLEEPY. WHEN
SPEAKING TO THE PATIENT ABOUT STRAIGHT CATH, HE BEGAN YELLING, "YOU ARE NOT
PUTTING ANYTHING FUCKING IN ME" PT THEN CLOSED EYES AND FELL BACK ASLEEP. IV
FLUIDS CONTINUE TO INFUSE. BREATHING EVEN AND UNLABORED. NO FURTHER NEEDS AT
THIS TIME.

## 2019-12-07 NOTE — NUR
PT UNABLE TO VOID. BLADDER SCAN SHOWS 375 IN BLADDER. AS DISCUSSED WITH DR. VARGAS, HEMPHILL CATHETER PLACED AT THIS TIME.

## 2019-12-07 NOTE — NUR
pt called out " I think I'm done". REBECCA Alejandro, SAWYER Latham, and myself assisted
pt. He was cleaned up and readjusted in bed. Nothing further needed at this
time.

## 2019-12-07 NOTE — NUR
report recieved. OOB TO COMMODE TO EXPELL SMALL LOOSE STOOL. TRANSFERRED TO
CHAIR. ASSESSNENT DONE. DENIES PAIN, NAUSEA. TALKATIVE.

## 2019-12-07 NOTE — NUR
REPORT RECEIVED FROM DAY SHIFT RN. PT LYING IN BED, AWAKE, ATTEMPTING TO GET
OUT OF BED TO SEE WHAT THE NOISE WAS IN THE HALLWAY. EXPLAINED TO PT THE
NURSES WERE DOING REPORT, PT WAS CONFUSED AND THOUGHT HE WAS AT HIS HOME.
ATTEMPTED TO REORIENT PT. BED ALARM ON FOR SAFETY. WARM BLANKET GIVEN. CALL
LIGHT IN REACH.

## 2019-12-07 NOTE — NUR
PT ATE TURKEY SANDWICH. COMPLAINS OF HAVING TO PEE. REMINDED PT A HEMPHILL
CATHETER IS IN PLACE AT THIS TIME.

## 2019-12-07 NOTE — NUR
REMAINS IN CHAIR. ASSESSMENT DONE. IS ORIENTED TO TIME AND PLACE. DR. VARGAS
HAS BEEN HERE TO SEE PATIENT, PATIENT TO BE TRANSFERRED TO MEDICAL FLOOR
TODAY.

## 2019-12-07 NOTE — NUR
PT UP TO BSC TO HAVE BOWEL MOVEMENT. BACK TO BED, CALL LIGHT WITHIN REACH AND
BED ALARM ON FOR SAFETY.

## 2019-12-07 NOTE — NUR
REPORT RECEIVED FOR [facility] SHIFT RN. PT IN ROOM. RESPIRATIONS EQUAL AND
NONLABORED. VISIBLE FROM NURSIGN STATION. BREAKFAST ORDERED.

## 2019-12-07 NOTE — NUR
VS, I&Os, and BS check was complete. Pt was cleaned of stool incontinece via
RN Priya. New bedding was applied. Pt did not need anything further at this
time.

## 2019-12-07 NOTE — NUR
PT WAS INCONTINENT OF BOWELS. COMPLETE BED CHANGE. CALL LIGHT WITHIN REACH. NO
FURTHER NEEDS AT THIS TIME.

## 2019-12-07 NOTE — NUR
PT UP TO BEDSIDE COMMODE, PT HAD A MEDIUM SIZE SOFT BM. BACK IN BED.
ASSESSMENT COMPLETED AT THIS TIME. CALL LIGHT WITHIN REACH. NO FURTHER NEEEDS
AT THIS TIME.

## 2019-12-07 NOTE — NUR
PT AWAKE IN ROOM. ASSESSMENT COMPLETED AT THIS TIME. PT REPORT NEUROPATHY PAIN
IN BOTH LEGS. SAT UP AT SIDE OF BED FOR 5 MIN. NOW LAYING BACK DOWN IN BED.
BED ALARM ON, CALL LIGHT WITHIN REACH. NO FURTHER NEEDS AT THIS TIME.

## 2019-12-07 NOTE — NUR
PT IS DEMANDING TO LEAVE THE HOSPITAL FOR A "WHILE" TO "SAY HI TO HIS DAUGHTER
AND WISH HIS GRANDKIDS MONE BELCHER".  THIS RN EXPLAINED THAT HE COULD NOT
LEAVE THE HOSPITAL DUE TO INPATIENT STATUS.  PT BECAME AGITATED AND DEMANDED
TO LEAVE. HE WANTED TO SEE THE DR.  DR KEENE AWARE.  THIS RN EXPLAINED THAT IF
HE LEFT, IT WOULD BE AGAINST MEDICAL ADVICE AND HE WOULD BE RESPONSIBLE FOR
THE BILL.  AFTER THAT, PT CALMED DOWN AND STOPPED YELLING OUT TO TALK TO THE
DOCTOR. I have reviewed and confirmed nurses' notes...

## 2019-12-07 NOTE — NUR
CHARGE RN ROUNDING NOTE. PT RESTING IN BED EATING A POPSICLE, DENIES
QUESTIONS, CONCERNS, OR NEEDS AT THIS TIME. CALL LIGHT IN REACH. ROOM IN VIEW
OF RN STATION. BED ALARM ACTIVE. WHITE BOARD UPDATED.

## 2019-12-08 NOTE — NUR
pt called out and said he couldnt go so SAWYER valera and myself got pt back into
bed. pt requested a warm blanket. nothing further was needed at this time.

## 2019-12-08 NOTE — NUR
RECIEVED BEDSIDE REPORT FROM SAWYER CAMARILLO. PT IS AWAKE, CALLING OUT IN BED.  NOT
ORIENTED, BUT DOES REORIENT EASILY.  REEDUCATED PT ON IMPORTANCE OF USING CALL
LIGHT AND NOT YELLING OUT.

## 2019-12-08 NOTE — NUR
PT UP TO CHAIR MOST OF DAY.  MULTIPLE SMALL, SOFT, YELLOW BM.  HEMPHILL DRAINING
CLEAR YELLOW URINE.  PT APPEARED TO BE LESS CONFUSED.  ONE EPISODE OF NAUSEA,
ZOFRAN EFFECTIVE.

## 2019-12-08 NOTE — NUR
PT REQUESTED A SANDWICH BOX. AT THIS TIME REBECCA MARTINEZ AND MYSELF PUT PT BACK
INTO BED FROM THE BSC. HE RESULTED IN SMALL SOFT BM. PT DID NOT NEED ANYTHING
FURTHER AT THIS TIME.

## 2019-12-08 NOTE — NUR
PT FINISHED WITH MALAIKA, STILL NO C/O NAUSEA. BACK TO BED. SCD'S IN PLACE.
NEW ALLEVYN PLACED ON LEFT HEEL WOUND. PT MEDICATED WITH PRN TRAZADONE FOR
SLEEP. NO FURTHER NEEDS AT THIS TIME. CALL LIGHT IN REACH.

## 2019-12-08 NOTE — NUR
PT HAD A RESTLESS NIGHT. MULTIPLE TIMES UP TO BSC TO HAVE BM. ALERT AND
TALKATIVE, CONFUSED AT TIMES. HAS NOT USED CALL LIGHT, WILL CALL OUT FOR HELP.
HEMPHILL DRAINING GENI COLORED URINE. PT PORTER REG DIET. ACCU CHECKS AND MULTIPLE
INSULIN ORDERS. SCATTERED ABRASIONS IN VARIOUS STAGES OF HEALING OVER ENTIRE
BODY. ALLEVYN ON LEFT HEEL AND UMBILICUS D/T SCABS. WOUND NURSE CONSULTING.
HEP C+. TYLENOL FOR PAIN.

## 2019-12-08 NOTE — NUR
PT INCONTINENT OF STOOL, SAMMY CARE DONE. HEMPHILL CARE COMPLETE. NEW ATTENDS AND
LINEN PROVIDED WITH 2 STAFF ASSIST. EVENING ASSESSMENT COMPLETE. PM MEDS GIVEN
WITHOUT ISSUE. NEW ALLEVYN PLACED TO LEFT HEEL WOUND. MULTIPLE WOUNDS NOTED AT
DIFFERENT STAGES OF HEALING ALL OVER BODY. MEDICATED WITH PRN FOR NERVE PAIN
IN LE. NO FURTHER NEEDS. CALL LIGHT IN REACH.

## 2019-12-08 NOTE — NUR
PT RESTING COMFORTABLY.  C/O BEING COLD, RN COVERED HIM WITH BLANKETS WHICH HE
WAS VERY APRECIATIVE OF.  CALL LIGHT IN REACH, REMINDED PT TO USE IT.  NO
ADDITIONAL NEEDS AT THIS TIME.

## 2019-12-08 NOTE — NUR
PT WAS IN BED WITH NO CLOTHES, RN RE-DRESSED HIM IN A GOWN AND REMINDED HIM TO
KEEP IT ON.  PT AGREED.

## 2019-12-08 NOTE — NUR
PT UP TO BSC WITH 2PA AND FWW. PT STATES HE FEELS LIKE HE IS GETTING STRONGER.
PT ABLE TO WALK A FEW STEPS WITH LITTLE ASSIST. BACK TO BED, PORTER WELL. SCD'S
AND HP IN PLACE. CALL LIGHT IN REACH.

## 2019-12-08 NOTE — NUR
PT RESTING IN BED WITH EYES CLOSED, NAD. HEMPHILL PATENT DRAINING GENI COLORED
URINE. CALL LIGHT IN REACH.

## 2019-12-08 NOTE — NUR
EVENING ASSESSMENT COMPLETE. PM MEDS GIVEN WITHOUT ISSUE. PRN GIVEN FOR
ITCHING. PT DENIES NAUSEA AT THIS TIME, REQUESTING SOMETHING TO EAT. BS
CHECKED, INSULIN PROVIDED PER ORDER. SCD'S IN PLACE. HEMPHILL DRAINING GENI
COLORED URINE, ENCOURAGED FLUID INTAKE. CALL LIGHT IN REACH.

## 2019-12-08 NOTE — NUR
PT CALLING OUT. REQUESTING BSC. UP WITH 2 PERSON SBA AND FWW. SMALL BM. SAMMY
CARE DONE, NEW ATTENDS PLACED. LINENS CHANGED. PT BACK TO BED, PORTER WELL. SCD'S
AND HP IN PLACE. FRESH ICE WATER GIVEN. CALL LIGHT IN REACH.

## 2019-12-08 NOTE — NUR
PT IS MORE ALERT, ABLE TO HAVE A CONVERSATION AT THIS TIME.  STATES HE WANTS
TO GO HOME TO PLAY WITH HIS GRANDDAUGHTER.  HE STATED THAT HE IS APRECIATIVE
OF THE CARE HE HAS BEEN GIVEN, BUT IS READY TO GO HOME.

## 2019-12-08 NOTE — NUR
PT UP TO BSC WITH FWW AND 2PA TO HAVE SMALL SOFT BM. SAMMY CARE DONE. BACK TO
BED, PORTER WELL. SCD'S AND HP IN PLACE. CALL LIGHT IN REACH.

## 2019-12-08 NOTE — NUR
REPORT RECEIVED FROM DAY SHIFT RN. PT ON BSC. SAMMY CARE DONE, NEW BRIEF IN
PLACE. BACK TO BED WITH FWW AND 2 PERSON STAND-BY ASSIST.
PORTER UMANA. KANCHAN IN PLACE. SCD'S ON. ICE
CREAM GIVEN PER PT REQUEST. CALL LIGHT IN REACH.

## 2019-12-08 NOTE — NUR
ASSISTED PT TO BSC.  HE HAD A SMALL, SOFT, YELLOW BM.  ASSISTED HIM BACK TO
BED.  PT HAS BEEN APPROPRIATE SINCE GETTING GOWN ON.

## 2019-12-09 NOTE — NUR
PATIENT SITTING UP IN CHAIR.  CNA JUST BROUGHT IN A POPSICLE.  PATIENT STATES
HE IS EATING WELL.  HIS APPETITE IS BETTER AND HE IS MORE ALERT.  HE HASN'T
BEEN DRINKING ENSURE THE PAST COUPLE OF DAYS BECAUSE HE DOESN'T THINK HE NEEDS
THEM.  HE IS FAMILIAR WITH THE DRINK BECAUSE HE GAVE THEM TO HIS MOM BEFORE
SHE PASSED AWAY.  HE WILL DRINK THEM AT HOME IF HE FEELS HE IS NOT EATING AS
WELL AS HE SHOULD. WILL CONTINUE TO MONITOR.

## 2019-12-09 NOTE — NUR
PATIENT IS RESTING IN BED WATCHING TV. PATIENT ASSESMENT COMPLETED. PATIENTS
VITALS TAKEN AND RECORDED. INTAKE AND OUPUT RECORDED. PATIENT RATES PAIN AT A
5/10. PATIENT DENIES THE NEED FOR ANY INTERVENTION. PATIENTS BS IS WNL NO
INSULIN NEEDED. PATIENT GIVEN SCHEDULED MEDICATION PER ORDER. PATIENT PROVIDED
WITH A POPSICLE PER REQUEST. PATIENT DENIES ANY FURTHER NEEDS. CALL LIGHT IN
REACH.

## 2019-12-09 NOTE — NUR
RECIEVED BEDSIDE REPORT FROM SAWYER CAMARILLO.  PT IS RESTING COMFORTABLY AT THIS
TIME. UP MULTIPLE TIMES OVERNIGHT FOR FREQUENT BM. PT IS MUCH LESS CONFUSED,
ABLE TO HAVE A CONVERSATION.

## 2019-12-09 NOTE — NUR
CALL LIGHT ANSWERED. PT UP TO BSC WITH 2PA AND FWW TO HAVE SMALL SOFT BM. PT
ALSO INCONTINENT OF STOOL. SAMMY CARE DONE BY STAFF, NEW ATTENDS. BACK TO BED,
PORTER WELL. CALL LIGHT IN REACH.

## 2019-12-09 NOTE — NUR
CALL LIGHT ANSWERED. PT INCONTINENT OF STOOL, UP TO BSC WITH 2PA AND FWW TO
HAVE SM SOFT BM. SAMMY CARE DONE, NEW ATTENDS ON. NO FURTHER NEEDS. CALL LIGHT
WITHIN REACH.

## 2019-12-09 NOTE — NUR
PT RESTED WELL. ALERT AND ORIENTED. USES THE CALL LIGHT OCCASIONALLY, WILL
CALL OUT AT OTHER TIMES. PT DENIES NAUSEA. EATING A REG DIET. HEMPHILL. MULTIPLE
SOFT BOWEL MOVEMENTS, INCONTINENT OF STOOL AT TIMES. TRANSFERS WITH 2PA AND
FWW. SCD'S AND HP. ALLEVYN ON LEFT HEEL REPLACED. ALLEVYN ON UMBILICUS, CDI.
MULTIPLE SCRATCHES AND SCABS ALL OVER BODY AT VARIOUS STAGES OF HEALING. PRN
GIVEN FOR ITCHING. PRN TRAZADONE FOR SLEEP.

## 2019-12-09 NOTE — NUR
HEMPHILL REMOVED.  ADVISED PT THAT HE NEEDS TO VOID IN 4 HOURS TO DISCHARGE. PT
STATED HE WOULD "DRINK GALLONS OF WATER SO YOU CAN COME HELP ME UP!" WOUND ON
HEEL WAS REDRESSED. 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
DO HIS WOUND CARE.

## 2019-12-09 NOTE — NUR
RECEIVED REPORT FROM DAY SHIFT RN. PATIENT IS RESTING IN BED WATCHING TV.
PATIENT DENIES ANY NEEDS. CALL LIGHT IN REACH.

## 2019-12-09 NOTE — NUR
SPOKE WITH ADIN IN ROOM.  PATIENT ORIENTED.  DISCUSSED WITH HIM THAT HIS
DAUGHTER SPOKE WITH DISCHARGE LAST WEEK AND WAS HOPING HE WOULD GO TO SNF
BEFORE RETURNING HOME.  DISCUSSED THAT DOCTOR IS THINKING HE NEEDS A SKILLED
NURSING STAY TO GET STRONGER BEFORE GOING HOME.  PATIENT STATES HE DOES NOT
WANT TO GO TO A FACILITY.  STATES HE NEEDS TO GET HOME.  STATES  "MY HOUSE IS
A MESS, I NEED TO GET BACK".  DISCUSSED THE CONCERNS HE HAS, HE STATES HIS
DAUGHTER IS STAYING THERE AND HE HAD A LADY WHO MOVED IN TO HELP HIM WHO
TURNED OUT "TO BE BAD NEWS, SHE BROUGHT A BUNCH OF DRUGGIES TO STAY AND SHE
WAS NOT A NURSE LIKE SHE TOLD ME SHE WAS".  WE DISCUSSED THAT HIS DAUGHTER HAS
SPOKEN TO OUR DISCHAGE PLANNER LAST WEEK ABOUT THIS AND SHE WAS DEALING WITH
THIS.  DISCUSSED THAT HE IS A RISK FOR FALLS AND IT IS HOPEFUL WITH CONTINUED
THERAPY HE WILL BE STRONG ENOUGH TO GO BACK HOME.  HE STILL STATES HE ISN'T
GOING TO GO TO SNF.  HE WANTS ME TO CALL HIS DAUGHTER.
 
CALLED GENI [phone].  MESSAGE LEFT FOR CALL BACK.

## 2019-12-09 NOTE — NUR
MESSAGE FROM [facility] RECEIVED.  SHE ASKED FOR A RETURN CALL.  TRIED TO CALL HER
BACK AND IT WENT TO VOICE MAIL.  TRIED A SENCOND TIME IN ABOUT 10 MINUTES AND
SHE WAS ABLE TO SPEAK WITH ME.  WE DISCUSSED THAT SILVA DOES NOT WANT TO GO
TO SNF.  SHE STATES SHE KNEW THIS AS SHE SPOKE WITH HIM ALSO.  SHE STATES SHE
WAS HOPING HE WOULD, BUT THAT SHE HAS MOVED INTO THE HOME WITH HER CHILD AND
SHE IS CLEANING IT UP.  SHE STATES THE ROOMMATE IS CURRENTLY MOVING OUT AND
SHOULD BE GONE SHORTLY.  SHE IS WILLING TO STAY AND HELP PATIENT WHEN HE
RETURN HOME.  SHE STATES HER MAIN WORRY IS THAT HE NEEDS TO BE ABLE TO GET
HIMESELF UP AND INTO W/C AND GET TO BATHROOM ON OWN.  WE DISCUSSED THAT WE CAN
ORDER HOME HEALTH TO HELP HER WITH SHOWER, BEING ABLE TO TOILET AND HELP IN
HIS CARE AT HOME AND CONTINUE PHYSICAL THERAPY.  SHE WOULD LIKE THAT VERY
MUCH.  STATES HE HAS A W/C AT HOME BUT IT IS NOT VERY "SAFE, IT HAS ALOT OF
DUCT TAKE".  DISCUSSED SHE CAN TRY [facility] TO POSSIBLY BORROW ONE.
SHE WILL GO DOWN TODAY.  ALL QUESTIONS ANSWERED.  WILL CONTINUE TO FOLLOW.

## 2019-12-10 NOTE — NUR
DISCHARGE INSTRUCTIONS COMPLETED WITH PT, WOUND CARE SUPPLIES SENT HOME WITH
PT. AWAITING WHEEL CHAIR TAXI TO TAKE PT HOME.

## 2019-12-10 NOTE — NUR
PT RESTING IN IN BED. PT HAS NOT RECEIVED BREAKFATS, CALLED AND BREAKFAST
SHOULD BE ARRIVING SOON. PT ON ROOM AIR, LUNG SOUNDS CLEAR. PT WITH SCATTERED
SCABS, LEFT HEEL WITH DRIED PRESSURE WOUND, NO DRESSING PRESENT, RVIEWED CHART
AND FOUND ORDERS FOR DRESSING CHANGE, WILL COMPLETE BEFORE DISCHARGE. PT BOWEL
TONES ACTIVE, DENIES NAUSEA. DISCUSSED PLAN OF CARE PT DENIES OTHER NEEDS AT
THIS TIME.

## 2019-12-10 NOTE — NUR
CALL LIGHT ANSWERED. PATIENT USING BASE COMMODE. PATIENT BACKS TO BED. ONE
PERSON ASSISTING. VITAL SIGNS AND I&O DONE. PATIENT REFUSED TO TAKE A SHOWER
TODAY BECAUSE HE SAID "I TOOK A SHOWER YESTERDAY AND IS ENOUGH". CALL LIGHT
WITHIN REACH. NO OTHER NEEDS AT THIS TIME

## 2019-12-10 NOTE — NUR
PATIENT RESTED WELL THROUGHOUT THE SHIFT. PATIENT IS ON A REGULAR DIET,
TOLERATING IT WELL, NO COMPLAINTS OF NAUSEA NOTED. PATIENT IS A 1PA W/FWW,
PIVOT TRANSFER. PATIENT IS WORKING WITH PT/OT. SCDS IN USE. PATIENTS IV IS SL
AND FLUSHES WELL. PATIENT IS ON RA. AAOX3 AND USES CALL LIGHT APPROPRIATELY.

## 2019-12-10 NOTE — NUR
Pt sitting in the nagy dressed in wc.  Plans on discharging today.  Will go
home to his house, daughter is moving in to care for him.  Denies further
needs.  Daughter made [facility] referral.

## 2019-12-10 NOTE — NUR
PT ASSISTED OFF BSC. PT BLOOD GLUCOSE 128, SS HUMULIN HELD. AWAITING FOR
DRESSING SUPPLIES FOR DRESSING CHANGES. PT DENIES OTHER NEEDS AT THIS TIME.

## 2019-12-10 NOTE — NUR
PATIENT ASSISTED TO THE BSC AS A 1PA. PATIENT WAS ABLE TO HAVE A BM. PATIENT
IS NOW BACK IN BED RESTING. MORNING MEDICATIONS GIVEN PER ORDER. PATIENT
DENIES ANY NEEDS. CALL LIGHT IN REACH.

## 2019-12-10 NOTE — NUR
PATIENT CALLED AND REQUESTED ICE WATER. PATIENT IS RESTING IN BED WATCHING TV.
PATIENT RATES PAIN AT A 4/10 "ALL OVER". PATIENT DENIES THE NEED FOR PAIN
MEDICATION AT THIS TIME. PATIENT DENIES ANY FURTHER NEEDS. CALL LIGHT IN
REACH.

## 2020-01-23 NOTE — XMS
PreManage Notification: ZEUS GRANADO MRN:[id number]
 
Security Information
 
Security Events
No recent Security Events currently on file
 
 
 
CRITERIA MET
------------
- History of Sepsis Dx
- [facility] - 2 Visits in 30 Days
 
 
CARE PROVIDERS
-------------------------------------------------------------------------------------
KARI PERRY     Physician Assistant     Current
 
PHONE: Unknown
-------------------------------------------------------------------------------------
LORE WELCH     [facility]     09/27/2018-Current
 
PHONE: [phone]
-------------------------------------------------------------------------------------
ILENE PIERCE     Primary Care     Current
 
PHONE: Unknown
-------------------------------------------------------------------------------------
[facility]       Primary Care     Current
ADMIN
 
PHONE: Unknown
-------------------------------------------------------------------------------------
 
Jordana has no Care Guidelines for this patient.
 
MARI VISIT COUNT (12 MO.)
-------------------------------------------------------------------------------------
4 [facility] [place]
-------------------------------------------------------------------------------------
TOTAL 4
-------------------------------------------------------------------------------------
NOTE: Visits indicate total known visits.
 
ED/UCC VISIT TRACKING (12 MO.)
-------------------------------------------------------------------------------------
01/23/2020 18:26
[facility] [place]
 
TYPE: Emergency
 
COMPLAINT:
- WEAKNESS
-------------------------------------------------------------------------------------
01/04/2020 16:37
[facility] [place]
 
TYPE: Emergency
 
 
COMPLAINT:
- FALL
 
DIAGNOSES:
- Other long term (current) drug therapy
- Unspecified cirrhosis of liver
- Other chest pain
- Multiple fractures of ribs, right side, init for clos fx
- Pleural effusion, not elsewhere classified
- Long term (current) use of aspirin
- Fall from or off toilet w strike against object, init
- Hyperglycemia, unspecified
- Personal history of nicotine dependence
- Essential (primary) hypertension
- Long term (current) use of insulin
-------------------------------------------------------------------------------------
12/03/2019 11:21
[facility] [place]
 
TYPE: Emergency
 
COMPLAINT:
- VOMITING
-------------------------------------------------------------------------------------
10/16/2019 13:59
[facility] [place]
 
TYPE: Emergency
 
COMPLAINT:
- WEAKNESS
-------------------------------------------------------------------------------------
 
 
INPATIENT VISIT TRACKING (12 MO.)
-------------------------------------------------------------------------------------
12/03/2019 17:31
[facility] [place]
 
TYPE: Medical Surgical
 
COMPLAINT:
- HEMATEMESIS
 
DIAGNOSES:
- Long term (current) use of aspirin
- Postpolio syndrome
- 1 Type 2 diabetes mellitus with hyperglycemia
- 1 Myocardial infarction type
- Atrioventricular block, complete
- Non-prs chr ulcer of left heel and midft lmt to brkdwn skin
- Presence of cardiac pacemaker
- Presence of prosthetic heart valve
- Other nonspecific abnormal finding of lung field
- Gastrointestinal hemorrhage, unspecified
- 1 Type 2 diabetes mellitus with diabetic polyneuropathy
- Chronic or unspecified gastric ulcer with hemorrhage
- Other long term (current) drug therapy
- Other pancytopenia
 
- Retention of urine, unspecified
- Dependence on wheelchair
- Unspecified viral hepatitis C without hepatic coma
- 1 Type 2 diabetes mellitus with foot ulcer
- Essential (primary) hypertension
- Other stimulant abuse, uncomplicated
- Unspecified cirrhosis of liver
- Toxic encephalopathy
- Long term (current) use of insulin
- Esophagitis, unspecified
- Acute posthemorrhagic anemia
- Unspecified chronic gastritis without bleeding
- Hyperlipidemia, unspecified
-------------------------------------------------------------------------------------
10/16/2019 16:59
[facility]
 
TYPE: Medical Surgical
 
COMPLAINT:
- HYPERGLYCEMIC CRISIS
 
DIAGNOSES:
- Other long term (current) drug therapy
- Chronic viral hepatitis C
- Dependence on wheelchair
- 1 Type 2 diab w hyprosm w/o nonket hyprgly-hypros coma ([facility]
- Presence of prosthetic heart valve
- Viral intestinal infection, unspecified                      Viral int
- Chronic viral hepatitis C
- Viral intestinal infection, unspecified                      Viral int
- Unspecified cirrhosis of liver
- Essential (primary) hypertension
- 1 Type 2 diabetes mellitus with diabetic polyneuropathy
- 1 Type 2 diabetes mellitus with hyperglycemia
- Unspecified cirrhosis of liver
- Presence of cardiac pacemaker
- Presence of prosthetic heart valve
- Patient's noncompliance w oth medical treatment and regimen
- Long term (current) use of aspirin
- Hyperkalemia
- Other long term (current) drug therapy
- Essential (primary) hypertension
- Dependence on wheelchair
- 1 Type 2 diab w hyprosm w/o nonket hyprgly-hypros coma ([facility]
- Presence of cardiac pacemaker
- Long term (current) use of insulin
- Long term (current) use of aspirin
- Long term (current) use of insulin
- Patient's noncompliance w ot medical treatment and regimen
- Other pancytopenia
- Other pancytopenia
- Hyperkalemia
- 1 Type 2 diabetes mellitus with diabetic polyneuropathy
-------------------------------------------------------------------------------------
 
[email or web address]

## 2020-01-24 NOTE — EKG
[facility]
                                    [address] [place]  [address]
_________________________________________________________________________________________
                                                                 Signed   
 
 
Ventricular-paced rhythm with occasional premature ventricular complexes
Abnormal ECG
When compared with ECG of 03-DEC-2019 14:45,
premature ventricular complexes are now present
Vent. rate has decreased BY  10 BPM
Confirmed by MIGUE KEENE MD ([id number]) on 1/24/2020 8:00:59 AM
 
 
 
 
 
 
 
 
 
 
 
 
 
 
 
 
 
 
 
 
 
 
 
 
 
 
 
 
 
 
 
 
 
 
 
 
 
 
 
    Electronically Signed By: MIGUE KEENE MD  01/24/20 0801
_________________________________________________________________________________________
PATIENT NAME:     ZEUS GRANADO              
MEDICAL RECORD #: [id number]                     Electrocardiogram             
          ACCT #: [id number]  
DATE OF BIRTH:   [birth year]                                       
PHYSICIAN:   MIGUE KEENE MD                   REPORT #: [id number]
REPORT IS CONFIDENTIAL AND NOT TO BE RELEASED WITHOUT AUTHORIZATION

## 2020-04-14 NOTE — XMS
PreManage Notification: ZEUS GRANADO MRN:[id number]
 
Security Information
 
Security Events
No recent Security Events currently on file
 
 
 
CRITERIA MET
------------
- History of Sepsis Dx
- PDMP
 
 
CARE PROVIDERS
-------------------------------------------------------------------------------------
Name Unknown     Skilled Nursing Facility     Current
 
PHONE: [phone]
-------------------------------------------------------------------------------------
KARI PERRY     Physician Assistant     Current
 
PHONE: Unknown
-------------------------------------------------------------------------------------
[facility]     09/27/2018-Current
 
PHONE: [phone]
-------------------------------------------------------------------------------------
Ynes Coburn     /Care Coordinator     02/10/2020-Current
 
PHONE: [phone]
-------------------------------------------------------------------------------------
 
Jrodana has no Care Guidelines for this patient.
Care History
Medical/Surgical
01/27/2020    [facility]
 
      - PATIENT CURRENTLY RECEIVING HOME HEALTH SERVICES- PATRICE- PATIENT 
      NEEDS A PCP TO CONTINUE WITH HOME HEALTH ORDERS
      - [facility] CONTACTED [facility]- PATIENT HAS 15 1/2 HOURS A WEEK OF 
      CAREGIVING SERVICES IN THE HOME- PATIENT HAS BEHAVIORS TOWARDS CAREGIVERS IN
      THE HOME. [facility] MIGHT PULL OUT SERVICES FROM PATIENT IF IT
      CONTINUES. PATIENT IS NON COMPLIANT WITH MEDICATIONS AND HAS RANDOM
      INDIVIDUALS WHO LIVE IN THE HOME. PATIENT DAUGHTER NO LONGER LIVES IN THE HOME.
      
      - [facility]-APS REFERRAL HAS BEEN MADE DUE TO RECENT ER VISITS -CHW RECOMMENDED 
      FOLLOW UP- APS WORKER ALLISON FREEDMAN.
      - LADONNA PAIGE- PATIENT [facility] CURRENT  
      - PATIENT HAS NO SHOWED TO APTS AT THE CLINIC TO ESTABLISH CARE- DOES NOT HAVE 
      A PCP
E.D. VISIT COUNT (12 MO.)
-------------------------------------------------------------------------------------
6 [facility]
-------------------------------------------------------------------------------------
TOTAL 6
-------------------------------------------------------------------------------------
NOTE: Visits indicate total known visits.
 
 
ED/UCC VISIT TRACKING (12 MO.)
-------------------------------------------------------------------------------------
04/14/2020 21:17
[facility]
 
TYPE: Emergency
 
COMPLAINT:
- ABDOMINAL PAIN
-------------------------------------------------------------------------------------
02/05/2020 12:34
[facility]
 
TYPE: Emergency
 
COMPLAINT:
- LIVER FAILURE
 
DIAGNOSES:
- Weakness
- Long term (current) use of insulin
- Other fatigue
- Personal history of nicotine dependence
- Other long term (current) drug therapy
- Essential (primary) hypertension
- Long term (current) use of aspirin
- Other malaise
- Other specified abnormal findings of blood chemistry
-------------------------------------------------------------------------------------
01/23/2020 18:26
[facility]
 
TYPE: Emergency
 
COMPLAINT:
- WEAKNESS
 
DIAGNOSES:
- Essential (primary) hypertension
- Type 2 diabetes mellitus with hyperglycemia
- Other long term (current) drug therapy
- Personal history of nicotine dependence
- Long term (current) use of aspirin
-------------------------------------------------------------------------------------
01/04/2020 16:37
[facility]
 
TYPE: Emergency
 
COMPLAINT:
- FALL
 
DIAGNOSES:
- Other long term (current) drug therapy
- Unspecified cirrhosis of liver
- Other chest pain
- Multiple fractures of ribs, right side, initial encounter for
- Pleural effusion, not elsewhere classified
- Long term (current) use of aspirin
 
- Fall from or off toilet with subsequent striking against obje
- Hyperglycemia, unspecified
- Personal history of nicotine dependence
- Essential (primary) hypertension
- Long term (current) use of insulin
-------------------------------------------------------------------------------------
12/03/2019 11:21
[facility]
 
TYPE: Emergency
 
COMPLAINT:
- VOMITING
-------------------------------------------------------------------------------------
10/16/2019 13:59
[facility]
 
TYPE: Emergency
 
COMPLAINT:
- WEAKNESS
-------------------------------------------------------------------------------------
 
 
INPATIENT VISIT TRACKING (12 MO.)
-------------------------------------------------------------------------------------
02/05/2020 16:58
[facility] [place]
 
TYPE: Medical Surgical
 
DIAGNOSES:
- Other specified abnormal findings of blood chemistry
- Long term (current) use of insulin
- Other ascites
- Diarrhea, unspecified
- Unspecified cirrhosis of liver
- Essential (primary) hypertension
- Other pancytopenia
- Type 2 diabetes mellitus without complications
- Other malaise
- Hyperlipidemia, unspecified
- Acute ischemic heart disease, unspecified
-------------------------------------------------------------------------------------
12/03/2019 17:31
[facility] [place]
 
TYPE: Medical Surgical
 
COMPLAINT:
- HEMATEMESIS
 
DIAGNOSES:
- Long term (current) use of aspirin
- Postpolio syndrome
- Type 2 diabetes mellitus with hyperglycemia
- Myocardial infarction type 2
- Atrioventricular block, complete
- Non-pressure chronic ulcer of left heel and midfoot limited t
- Presence of cardiac pacemaker
 
- Presence of prosthetic heart valve
- Other nonspecific abnormal finding of lung field
- Gastrointestinal hemorrhage, unspecified
- Type 2 diabetes mellitus with diabetic polyneuropathy
- Chronic or unspecified gastric ulcer with hemorrhage
- Other long term (current) drug therapy
- Other pancytopenia
- Retention of urine, unspecified
- Dependence on wheelchair
- Unspecified viral hepatitis C without hepatic coma
- Type 2 diabetes mellitus with foot ulcer
- Essential (primary) hypertension
- Other stimulant abuse, uncomplicated
- Unspecified cirrhosis of liver
- Toxic encephalopathy
- Long term (current) use of insulin
- Esophagitis, unspecified
- Acute posthemorrhagic anemia
- Unspecified chronic gastritis without bleeding
- Hyperlipidemia, unspecified
-------------------------------------------------------------------------------------
10/16/2019 16:59
[facility] [place]
 
TYPE: Medical Surgical
 
COMPLAINT:
- HYPERGLYCEMIC CRISIS
 
DIAGNOSES:
- Other long term (current) drug therapy
- Chronic viral hepatitis C
- Dependence on wheelchair
- Type 2 diabetes mellitus with hyperosmolarity without nonketo
- Presence of prosthetic heart valve
- Viral intestinal infection, unspecified
- Chronic viral hepatitis C
- Viral intestinal infection, unspecified
- Unspecified cirrhosis of liver
- Essential (primary) hypertension
- Type 2 diabetes mellitus with diabetic polyneuropathy
- Type 2 diabetes mellitus with hyperglycemia
- Unspecified cirrhosis of liver
- Presence of cardiac pacemaker
- Presence of prosthetic heart valve
- Patient's noncompliance with other medical treatment and lilliam
- Long term (current) use of aspirin
- Hyperkalemia
- Other long term (current) drug therapy
- Essential (primary) hypertension
- Dependence on wheelchair
- Type 2 diabetes mellitus with hyperosmolarity without nonketo
- Presence of cardiac pacemaker
- Long term (current) use of insulin
- Long term (current) use of aspirin
- Long term (current) use of insulin
- Patient's noncompliance with other medical treatment and lilliam
- Other pancytopenia
- Other pancytopenia
- Hyperkalemia
 
- Type 2 diabetes mellitus with diabetic polyneuropathy
-------------------------------------------------------------------------------------
 
[email or web address]

## 2020-04-15 NOTE — NUR
WHILE PREPPING PT TO GO DOWN FOR ABDOMINAL CT, IT IS NOTED THAT PT'S ABDOMEN
APPEARS MORE DISTENDED THAN EARLIER AND ALSO FEELS SLIGHTLY MORE FIRM.  PT'S
SKIN COLOR REMAINS PALE BUT UNCHANGED.  VITAL SIGNS REMAINS STABLE.  PT DOWN
TO CT AT THIS TIME WITH MARINA DIETZ RN.  TELE REMAINS IN PLACE.

## 2020-04-15 NOTE — EKG
[facility]
                                    [address] [place]  [address]
_________________________________________________________________________________________
                                                                 Signed   
 
 
Ventricular-paced rhythm
Abnormal ECG
When compared with ECG of 05-FEB-2020 12:50,
Vent. rate has decreased BY   3 BPM
Confirmed by ELLIOT BANG MD ([id number]) on 4/15/2020 12:45:14 PM
 
 
 
 
 
 
 
 
 
 
 
 
 
 
 
 
 
 
 
 
 
 
 
 
 
 
 
 
 
 
 
 
 
 
 
 
 
 
 
 
    Electronically Signed By: ELLIOT BANG MD  04/15/20 1245
_________________________________________________________________________________________
PATIENT NAME:     ZEUS GRANADO KEAY              
MEDICAL RECORD #: [id number]                     Electrocardiogram             
          ACCT #: [id number]  
DATE OF BIRTH:   [birth year]                                       
PHYSICIAN:   ELLIOT BANG MD           REPORT #: [id number]
REPORT IS CONFIDENTIAL AND NOT TO BE RELEASED WITHOUT AUTHORIZATION

## 2020-04-15 NOTE — NUR
PICC INSERTION NOTE. ORDERS RECIEVED TO EVALUATE PT FOR POSSIBLE PICC
INSERTION. AFTER REVIEWING THE CHART AND INTERVIEWING THE PT, NO ABSOLUTE
CONTRAINDICATIONS WERE FOUND. CARE DISCUSSED WITH THE PRIMARY CCU RN AND A 4
FR LINE WAS REQUESTED. THE PT STATES HE HAS HAD MULTIPLE PICC LINES BEFORE AND
IS COMFORTABLE WITH THE PROCESS. RISKS AND COMPLICATIONS OF PICC LINES WERE
DISCUSSED WITH THE PT AND INFORMED CONSENT SIGNED PRIOR TO THE START OF THE
PROCEDURE.
THE LEFT ARM IS RESTRICTED DUE TO A LEFT SIDED PACEMAKER. THE RIGHT CEPHALIC
WAS ASSESSED FIRST DUE TO A SMALL BRACHIAL AND AN U/S IV IN THE RIGHT BASILIC.
THE RIGHT CEPHALIC VEIN IS TOO SMALL FOR A 4 FR PICC SO THE RIGHT BASILIC WAS
ASSESSED IN A PROXIMAL LOCATION TO THE U/S IV. THE VEIN IS A SUITABLE
CANDIDATE FOR PICC INSERTION. THE VEIN WAS EASILY ACCESSED AND THERE WERE NO
DIFFICULTIES ADVANCING THE GUIDEWIRE OR INTRODUCER. THE PICC DID NOT FLOAT
EASILY UP THE ARM SO GREAT CARE AND SLOW SPEED WAS USED TO ADVANCE THE LINE.
THE LINE WAS ADVANCED TO A PREMEASURED DISTANCE AND A CXR WAS TAKEN SHOWING
THE LINE HAD ADVANCED UP THE RIGHT NECK. THE LINE WAS REPOSITIONED AND A
SECOND CXR SHOWED THE TIP OF THE PICC IN A CENTRAL LOCATION. A STERILE
DRESSING WAS APPLIED AND REPORT GIVEN TO THE PRIMARY RN.
IN ADDITION TO THE USUAL INFECTION PREVENTION PPE, ISOLATION PRECAUTIONS / PPE
WERE USED UNDER THE STERILE GARB.

## 2020-04-15 NOTE — NUR
PATIENT CONTINUES TO REST AT THIS TIME. WILL DEFER ASSESSMENT UNTIL PATIENT
AWAKENS. PT SLEEPING ON RIGHT SIDE, SCDs ON, IVF CONTINUE  ML/HR. PT
STILL BOTHERED BY HEMPHILL CATHETER, BUT KNOWS THAT IT IS HELPING HIM TO VOID.
WILL CONTINUE TO MONITOR.

## 2020-04-15 NOTE — NUR
PATIENT PROVIDED WITH PRN PAIN MEDS FOR PAIN 9/10, GENERALIZED. BED LINENS
CHANGED. NEW ALYVEN PLACED ON LEFT SHIN AREA. PATIENT REPOSITIONED TO LEFT
SIDE. ICE CHIPS PROVIDED. PATIENT IS CONVERSATIONAL AND APPEARS ORIENTED.
HOWEVER HE DID ASKED FOR HIS "PAIN PILL" MULTIPLE TIMES AFTER BEING TOLD THE
MEDICATION WAS GIVEN THROUGH HIS IV SITE. PATIENT APPEARED TO REMEMBER BEING
TOLD THIS AFTER BEING TOLD A SECOND AND THRID TIME BUT CONTINUED TO ASK.
FREQUENT REMINDEDS PROVIDED. CALL LIGHT IN REACH. PATIENT DENIES ANY OTHER
NEEDS.

## 2020-04-15 NOTE — NUR
PT AWOKE AROUND 0330, VERY AGITATED, YELLING "HELP ME!"  IN TO CHECK ON PT WHO
STATES HE NEEDS TO URINATE.  ATTEMPTED TO HAVE PT USE URINAL, BUT HE IS
UNABLE.  CALL MADE TO DR. BANG DUE TO SEVERE DISCOMFORT, ORDER RECEIVED FOR
HEMPHILL PLACEMENT, AND 14F COUDE PLACED VIA STERILE TECHNIQUE.  PT TOLERATED
FAIR, 150ML GENI URINE RETURNED.  HOWEVER, AFTER BLADDER WAS EMPTIED, PT
STILL WAS VERY UNCOMFORTABLE AND YELLING "HELP ME," PT ABLE TO TELL ME THAT
HIS ABDOMEN WAS CAUSING HIM PAIN, DR. BANG AGAIN CALLED AND ORDER RECEIVED
FOR ONE TIME DOSE OF 0.25MG IV DILAUDID AND FOR A CT ABDOMEN/PELVIS WITH IV
CONTRAST.
ONCE PAIN MEDICATION WAS GIVEN, PT QUICKLY CALMED DOWN, VITAL SIGNS REMAINED
STABLE.  ASSESSMENT COMPLETED.  ORAL CARE PROVIDED.  FOAM DRESSING PLACED ON
PRESSURE ULCER ON LEFT HEEL.  SCD'S AND HEEL PROTECTORS PLACED.  CURRENTLY
AWAITING CT TO BECOME AVAILABLE FOR PT'S SCAN.

## 2020-04-15 NOTE — NUR
DR. BANG CALLED AND UPDATED. ORDERS TO BE PLACED PER MD. NO PARACENTESIS
TODAY DUE TO INCREASE IN CREATININE LEVEL. IV PAIN MEDICATION TO BE GIVEN.

## 2020-04-15 NOTE — NUR
DISCUSSED PATIENT'S PAIN AND WANTING WATER WITH MD. PATIENT ALLOWED ICE CHIPS
AND INCREASED FREQUENCY OF PRN PAIN MEDS.

## 2020-04-15 NOTE — NUR
THIS RN IN ROOM TO GIVE PATIENT PAIN MEDS (SEE EMAR). PT HELPED TO REPOSITION
TO LEFT SIDE. PT STILL C/O HEMPHILL MAKING HIM FEEL LIKE HE NEEDS TO VOID.
HUMIDITY ADDED TO OXYGEN. PT ASKING HOW MUCH LONGER HE NEEDS TO BE IN HOSPITAL
AND STATES, "IF IT'S OVER IN [place] THAT I NEED TO BE FOR THAT DOCTOR,
WHY AM I HERE?" DISCUSSED THIS WITH PATIENT AND HE WAS REFERRING TO A DOCTOR
THAT WILL DEAL WITH HIS LIVER. EXPLAINED TO PATIENT CURRENT TREATMENT PLAN. PT
ALSO CALLS HIS X-WIFE RILEY AND LEAVES MESSAGE WITH HER. HEMPHILL EMPTIED FOR
15O ML URINE. CONTINUE TO MONITOR.

## 2020-04-15 NOTE — NUR
In to speak with Kenneth.  Notified by Rn's he is sleeping and not able to
speak at this point.  Updated, I will call his daughter, Darlene.
 
Called and left message for daughter, requesting she return my call.

## 2020-04-15 NOTE — NUR
DISCUSSED PLAN OF CARE WITH DR. VARGAS MOVING FORWARD. PATIENT RESTING AT THIS
TIME. SCDs ON. PT ON 1 L OXYGEN WITH SP02 %.

## 2020-04-15 NOTE — NUR
BLOOD DRAWN FROM PICC FOR NOON CBC. PATIENT CALMS SOME, BUT WHEN AWAKENS
STATES AGAIN HOW JUST DOESN'T FEEL GOOD. CONTINUE TO MONITOR.

## 2020-04-15 NOTE — NUR
DR. KLEIN IN ROOM TO SEE PATIENT AT THIS TIME. PICC LINE COMPLETE. APPROVAL
GIVEN FROM DR. BANG TO USE PICC.

## 2020-04-15 NOTE — NUR
PT ARRIVED AT 0040 VIA STRETCHER TO ROOM 129.  DROPLET AND CONTACT PRECAUTIONS
IN PLACE FOR COVID-19 RULE-OUT.  PT TRANSFERED TO BED VIA SLIDE SHEET.  HE IS
LETHARGIC, CURRENTLY DENIES PAIN, ORIENTED ONLY TO SELF.  LUNGS HAVE COARSE
CRACKLES THROUGHOUT, 2L O2 VIA NC IN PLACE, CONSTANT NONPRODUCTIVE COUGH
PRESENT.  HR V-PACED.  BOWEL TONES HYPOACTIVE, ABDOMEN HAS ASCITES PRESENT, IS
SOMEWHAT FIRM TO PALPATION, BUT PT CURRENTLY DENIES TENDERNESS.  SKIN IS
FRAGILE, SCATTERED ABRASIONS AND BRUISES NOTED.  PT UNABLE TO TELL ME HOW HE
GETS AROUND AT HOME AT THIS TIME, HE IS ONLY ANSWERING SOME OF MY QUESTIONS
AND IS SLOW TO RESPOND.  IV PATENT, INFUSING WNL.  BLADDER SCAN DONE, SHOWED
530ML, DR. BANG NOTIFIED, BUT NO NEW ORDERS RECEIVED SINCE BLADDER SCAN IS
LIKELY INACCURATE WITH ASCITES PRESENT.  CB, 5 UNITS SLIDING SCALE
ADMINISTERED, VERIFIED WITH SAWYER COLLAZO.  PT'S MOUTH AND TONGUE HAVE BROWN
SUBSTANCE PRESENT, PT DENIES RECENT VOMITING.

## 2020-04-15 NOTE — NUR
DR. BANG IN ROOM AND PERITONEAL FLUID COLLECTED BY DR. BANG. PT TOLERATED
WELL. SAMPLE TO BE SENT TO LAB FOR ANALYSIS. PT TO GO DOWN FOR EGD THIS AM.

## 2020-04-15 NOTE — NUR
MD BANG WAS CALLED DUE TO PT INABILITY TO VOID. PT ALSO STATED THAT HE WAS
HAVING ABD PAIN. ORDER FOR A HEMPHILL WAS RECEIVED.

## 2020-04-15 NOTE — NUR
PATIENT CALLING OUT. REPORTS MULTIPLE COMPLAINTS WITH GENERALIZED PAIN,
FEELING HE NEEDS TO VOID, AND WANTING WATER. PROVIDED PATIENT WITH ORAL SWAB
AND ASSISTED WITH REPOSITIONING. HEMPHILL DRAINING WELL. ORAL TEMP WNL. PATIENT
IS ALERT. OREITNED TO SURROUNDINGS AND CARRIES CONVERSATION WELL. UNABLE TO
REMEMBER PARTS OF HIS ADMISSION AND SPECIFIC EVENTS TODAY. APPEARS FORGETFUL.
CALL LIGHT IN REACH. PATIENT WATCHING TV.

## 2020-04-15 NOTE — NUR
MD BANG WAS CALLED AGAIN SINCE PT IS STILL IN A LOT OF PAIN EVEN THOUGH A
HEMPHILL WAS INSERTED. AN ORDER WAS RECEIVED FOR 0.25MG IV DILAUDED ONCE AND AN
ABDOMINAL/PELVIC CT WITH IV CONTRAST.

## 2020-04-15 NOTE — NUR
PATIENT GIVEN WARM BLANKET AT THIS TIME. PT STATES, "I JUST NEED TO ROLL OVER
AND GO TO SLEEP." WAITING FOR IV ABX TO FINISH BEFORE THIS RN LEAVING ROOM.

## 2020-04-15 NOTE — NUR
PT HAS RETURNED FROM CT AND IS RESTING COMFORTABLY IN BED.  IVF INFUSING WNL.
SCD'S AND HEEL PROTECTORS BACK IN PLACE.  CB, 3 UNITS SLIDING SCALE
ADMINISTERED, VERIFIED BY SAWYER AMANDA.  PT TILTED ONTO LEFT SIDE WITH PILLOW
SUPPORT.  PT DENIES NEEDS, CALL LIGHT WITHIN REACH.

## 2020-04-15 NOTE — CONS
[facility]
                                    [address], [place]  [address]
_________________________________________________________________________________________
                                                                 Signed   
 
 
DATE OF CONSULTATION:
04/15/2020
 
CHIEF COMPLAINT:
Anemia.
 
HISTORY OF PRESENT ILLNESS:
Kenneth is a 69-year-old gentleman with hepatitis C associated cirrhosis of the liver
and ascites.  He was here in December 2019.  At that time, he had an upper GI bleed.
Apparently, there was a small ulcer in the upper portion of the stomach that had to be
clipped.  It was not actively bleeding at that time.  He returns now with increasing
generalized abdominal pain and distention.  I can tell, he has worsening of the ascites.
 He is obviously anemic with a hemoglobin of 8.6 and with a little hydration, he is at
8.3.  Mean cell volume is just inside normal range at 95.  Platelet count on the little
bit low at 139.  His blood sugars run high and he came in with a blood sugar around 416,
but it is down to 181.  His ammonia level was 43.  I have been asked to see him this
morning for consideration of repeat upper endoscopy.  However, he had been coughing down
in the emergency room and he has a rule out COVID-19 virus.  In the meantime, he has
undergone placement of PICC line and it has been quite successful.  There has been no
evidence of any clinical events of hematemesis or melena or hematochezia. 
 
PAST MEDICAL HISTORY:
Aortic stenosis, hepatitis C virus, hypertension, post-polio syndrome, BPH, he is
diabetic, insomnia,  virus, cirrhosis, complete heart block, GI bleed with a
gastric ulcer in December 2019, hyperlipidemia, peripheral diabetic neuropathy,
peripheral edema and various diabetic pressure injuries. 
 
PAST SURGICAL HISTORY:
Pacemaker, aortic valve replacement, and back surgery with metal.
 
SOCIAL HISTORY:
He quit smoking, but he has been using methamphetamines.  He has some wine.  His ex-wife
is Tari and his daughter is Darlene at [phone].  Dr. Mcdaniels is his podiatrist and I
am not sure of his primary care provider other than Dr. Elliot Bang.  He prefers the
[facility]. 
 
FAMILY HISTORY:
Not obtained.
 
REVIEW OF SYSTEMS:
Not obtained.
 
ALLERGIES:
None.
 
    Electronically Signed By: DENICE KLEIN MD  04/15/20 1626
_________________________________________________________________________________________
PATIENT NAME:     KENNETH GRANADO              
MEDICAL RECORD #: [id number]            CONSULTATION                  
          ACCT #: [id number]  
DATE OF BIRTH:   [birth year]            REPORT #: [id number]      
PHYSICIAN:        DENICE KLEIN MD             
PCP:              LORE WELCH MD              
REPORT IS CONFIDENTIAL AND NOT TO BE RELEASED WITHOUT AUTHORIZATION
 
 
                                  [facility]
                                    [address], [place]  [address]
_________________________________________________________________________________________
                                                                 Signed   
 
 
 
MEDICATIONS:
Flomax, nicotine patch, sucralfate, Protonix, insulin, tramadol, magnesium oxide, Lasix,
trazodone, folate, lisinopril, duloxetine, hydroxyzine, atorvastatin, and aspirin. 
 
PHYSICAL EXAMINATION:
VITAL SIGNS:  Blood pressure is 125/71, heart rate 102, respiratory rate 24, his T-max
is 99.7.  His T current is 98 degrees.  He is 5 feet 9 inches at 77 kg exam. 
GENERAL:  Kenneth is a 69-year-old gentleman, who just finished having his PICC line
placed in his right upper extremity.  He is a little somnolent, but easily arousable and
answers appropriately for the most part. 
LUNGS:  His lungs are generally clear to auscultation. 
HEART:  Paced. 
ABDOMEN:  He has a moderately distended, moderately firm abdomen, it is dull to
percussion throughout.  No peritoneal signs or symptoms. 
 
LABORATORY DATA:
His white blood cell count is 5.1, hemoglobin 8.6, his mean cell volume is 95 with
platelets 139, neutrophils are 61.  Sodium is 129, BUN 42, creatinine 1.48, glucose is
416 is down to 181.  His total bilirubin 0.4, AST 46, ALT 32, alkaline phosphatase 104,
albumin is 1.7.  INR 1.1.  Ammonia up a little at 43 and his lipase 60. 
 
RADIOGRAPHIC STUDIES:
Chest x-ray shows some interstitial markings in the right.  I am not sure that there are
new.  He seems to have a subacute right 8th rib fracture.  The CT scan shows the ascites
with a nodular liver, some cholelithiasis, but no free air. 
 
ASSESSMENT AND PLAN:
Kenneth is a 69-year-old gentleman, who presents with what looks like worsening and/or
decompensated cirrhosis of the liver with worsening ascites.  Although anemic, he is not
trending down at this time.  He did have a tiny gastric ulcer, nonbleeding ulcer clipped
in December 2019.  There was a little brown around his mouth and there were some
concerns maybe he had vomited some blood.  However, we did not see any clinical evidence
of that while here in the hospital.  He is generally hemodynamically stable.  He has
also had been coughing in the emergency room, so he has a rule out for COVID-19 virus.
Consequently, he has a very high risk to have upper endoscopy with respect to our
anesthesia staff, our nursing staff and myself.  At this point we are going to hold off
on the upper endoscopy and we will trend out his hemoglobin levels if necessary.  We
will certainly do his upper endoscopy, but otherwise we are going to hold off due to the
high 
risk.  I have explained that to Kenneth and the staff.  He has expressed understanding
and agrees above plan. 
 
 
 
    Electronically Signed By: DENICE KLEIN MD  04/15/20 1306
_________________________________________________________________________________________
PATIENT NAME:     KENNETH GRANADO              
MEDICAL RECORD #: [id number]            CONSULTATION                  
          ACCT #: [id number]  
DATE OF BIRTH:   [birth year]            REPORT #: [id number]      
PHYSICIAN:        DENICE KLEIN MD             
PCP:              LORE WELCH MD              
REPORT IS CONFIDENTIAL AND NOT TO BE RELEASED WITHOUT AUTHORIZATION
 
 
                                  [address]  [address]
_________________________________________________________________________________________
                                                                 Signed   
 
 
 
            ________________________________________
            Denice Klein MD 
 
 
ALB/MODL
Job #:  [id number]
DD:  04/15/2020 09:51:36
DT:  04/15/2020 15:25:14
 
cc:            MD Denice Stark MD
 
 
Copies:  ELLIOT BANG MD, ANDREW L MD
~
 
 
 
 
 
 
 
 
 
 
 
 
 
 
 
 
 
 
 
 
 
 
 
 
 
 
 
    Electronically Signed By: DENICE KLEIN MD  04/15/20 1626
_________________________________________________________________________________________
PATIENT NAME:     KENNETH GRANADO              
MEDICAL RECORD #: [id number]            CONSULTATION                  
          ACCT #: [id number]  
DATE OF BIRTH:   [birth year]            REPORT #: [id number]      
PHYSICIAN:        DENICE KLEIN MD             
PCP:              LORE WELCH MD              
REPORT IS CONFIDENTIAL AND NOT TO BE RELEASED WITHOUT AUTHORIZATION

## 2020-04-15 NOTE — NUR
PATIENT NOTED TO BE CALLING OUT LOUD, "OH MAN, OH FUCK, OH GOD HELP ME. I
HURT. I HURT." THIS RN IN ROOM TO ASSESS PATIENT. PATIENT CONTINEUS TO STATES,
"I'M NOT FEELING GOOD, I'M NOT FEELING GOOD, OH MAN OH MAN." PATIENT NOTED TO
BE PASSING FOUL SMELLING GAS. PT HELPED TO TURN ON HIS SIDE AND CONTINUES TO
PASS THIS GAS. PT'S ABDOMEN REMAINS DISTENEDE AND PER CT SCAN, HAS MASSIVE
ASCITES. HEMPHILL UNCLAMPED AND URINE SAMPLE OBTAINED AND SENT TO LAB. PT
APOLOGIZES FOR HIS LANGUAGE, BUT STATES HE JUST ISN'T FEELING WELL. SCDs ON.
PT REMAINS ON 2 L NC WITH SP02 AT 96% AT THIS TIME. LR INFUSING AT 125ML/HR.
IV MAG BEING GIVEN. ATTENDS PLACED UNDER PATIENT. PICC LINE RIGHT UPPER ARM
WORKING WELL WITHOUT COMPLICATIONS. CONTINUE TO MONITOR.

## 2020-04-15 NOTE — NUR
PATIENT HAS CALLED OUT A NUMBER OF TIMES FOR NURSE STAFF. ONCE WHEN RN ENTERED
THE ROOM THE PATIENT DENIED EVER CALLING FOR ASSISTANCE AND DENIED ANY NEEDS.
THE NEXT TIME THE PATIENT ASKED TO USE THE BSC. STATES HE IS UNSURE IF HE
NEEDS TO HAVE A BM. REFUSED TO USE THE BEDPAN. INFORMED PATIENT THAT STAFF
FEELS UNSAFE TO TRANSFER AT THIS TIME AND ENCOURAGED BEDPAN. PATIENT THEN
DENIED NEED TO HAVE BM. STATING "I DON'T REALLY KNOW WHAT I WANT". PATIENT
REMAINS ORIENTED TO PERSON, PLACE AND TIME BUT SEEMS SOMEWHAT CONFUSED.
ENCOURAGED HIM TO REST AND ASSURED HIM STAFF WOULD BE BACK TO CHECK ON HIM
SOON. PATIENT AGREED TO THIS. CALL LIGHT IN REACH. VS STABLE. URINE OUTPUT QS.
PICC LINE SITE WNL, IV FLUIDS PER ORDER.

## 2020-04-15 NOTE — NUR
PROCEDURE CANCELLED AT THIS TIME. WILL CONTINUE TO MONITOR BLOOD COUNTS AND
DECIDE IF PATIENT WILL GO AT A LATER TIME.

## 2020-04-15 NOTE — NUR
IN PATIENT'S ROOM AT THIS TIME. PT GIVEN IV PAIN MEDICATION. PT NOW MORE AWAKE
AND STATING HE WANTS TO GO HOME. DISCUSSED WITH PATIENT HIS MEDICAL NEEDS AND
REASONS FOR NEEDING TO STAY IN HOSPITAL LONGER. PT AGREEABLE. PT STATES HE
WANTS TO CALL HIS WIFE. CELL PHONE PROVIDED FOR PATIENT BUT PATIENT UNABLE TO
USE BECAUSE HE DOESN'T HAVE HIS GLASSES. HEMPHILL DRAINING YELLOW URINE WITH SOME
SEDIMENT.

## 2020-04-16 NOTE — NUR
PT DENIES ANY PROBLMES AT THIS TIME, SEE ASSESSMENT. SAT 97%, O2 TURNED OFF
AND SAT DECREASED TO 94%.

## 2020-04-16 NOTE — NUR
On room air, helps with pt repositioned, bed pan given, no bm. f/c patent.
tolerating fluids well. scds and heel protectors in place. warm blankets
given. Pt not using call light. Continoues on  contact/droplet isolation

## 2020-04-16 NOTE — NUR
REPORT REC'D FROM NIGHT SHIFT RNs. PT MOANING OUT AT THIS TIME. ABLE TO
COMMUNICATE WITH PATIENT FROM DOOR AND PATIENT STATES HE IS UNCOMFORTABLE AND
NEEDS TO REPOSITION.  HR IN THE 80s, V PACED. LAST /68. PT HAS TAKEN HIS
OXYGEN OFF AND CURRENTLY SP02 IS 90% ON ROOM AIR.

## 2020-04-16 NOTE — NUR
1605: PT TRANSFERED TO MED-SURG ROOM 116. REPORT RECIEVED FROM TIMOTEO JACQUES. PT
DENIES ANY SOB, CP OR ANY OTHER PROBLEMS AT THIS TIME. VSS. PT ORIENTED TO HIS
NEW ROOM.

## 2020-04-16 NOTE — NUR
PLAN OF CARE DISCUSSED WITH DR. VARGAS AND PATIENT. PT TO HAVE LASIX,
LACTULOSE, AND CAN ADVANCE DIET AS TOLERATED BY PATIENT. LONGER DISCUSSION HAD
WITH PATIENT AND ENSURED THAT HE UNDERSTANDS PLAN OF CARE. PT ALSO GIVEN AN IS
AND USING THIS WITH BEST EFFORT AT 1250 ML.

## 2020-04-16 NOTE — NUR
PATIENT EATING LUNCH AND TOLERATED WELL. ERIC FROM CASE MANAGEMENT DISCUSSING
CARE WITH PATIENT AND TRYING TO HELP ARRANGE A PRIMARY CARE DOCTOR FOR HIM.
PATIENT REQUESTING ANOTHER CHOCOLATE ENSURE DRINK.

## 2020-04-16 NOTE — NUR
PATIENT ASSISTED TO TURN OVER IN BED WITH SECOND STAFF TO ASSIST. PATIENT HAS
NEW BRUISES/DISCOLORATION ON HIS LEFT FOREARM. PATIENT IS UNSURE IF HE
SCRATCHED HIMSELF OR HOW THESE OCCURED. SKIN IS INTACT. BLOOD DRAW FROM PICC
LINE, CLAVE CHANGED AND 20 ML NS FLUSH AFTER DRAW. IV FLUIDS CONTINUE PER
ORDER. VS STABLE. PATIENT REQUEST TO BE TURNED WHEN ASKED IF HE HAS ANY NEEDS.
REMINDED PATIENT HE HAD JUST BEEN TURNED. PATIENT DISAGREES AND STATES "IT'S
FINE I'LL STAY LIKE THIS". ENCOURAGED PATIENT TO REST. CALL LIGHT IN REACH.

## 2020-04-16 NOTE — NUR
IN PATIENT'S ROOM FOR VITALS AND ASSESSMENT. WATER AND ICE PROVIDED FOR
PATIENT. PT HUNGRY, AND HOPING TO EAT BREAKFAST THIS AM. ASSESSMENT COMPLETE.
CRACKLES AUSCULTATED IN LEFT LOWER LUNG POSTERIORLY. ACTIVE BOWEL SOUNDS.
HEMPHILL INTACT DRAINING YELLOW URINE, QS. PT TRIALED ON ROOM AIR BUT SP02 DOWN
DROP DOWN TO 86% AFTER A FEW MINUTES REST. IVF CONTINUE  ML/HR. PICC
LINE RIGHT UPPER ARM WORKING W/O PROBLEM, FLUSHES WELL AND ASPIRATES BLOOD AS
WELL.  THIS AM. SCDs ON. PITTING EDEMA PRESENT BILATERAL LOWER LEGS. PT
IN CONSTANT PAIN. LAST PAIN MED AROUND 0330 AM. CONTINUE TO MONITOR. PLAN OF
CARE BEING DISCUSSED.

## 2020-04-16 NOTE — NUR
ASSISTED PATIENT TO REPOSITION. PATIENT IS ALERT. FORGETFUL AT TIMES BUT
ORIENTED TO PERSON, PLACE, AND THE YEAR. VS STABLE. NO NAUSEA OR SPECIFIC ABD
PAIN. PATIENT REPORTS GENERALIZED PAIN. PRN PAIN MEDS PROVIDED. URINE OUTPUT
QS. PICC LINE RETURNS BLOOD, FLUSHED WELL. IV FLUID PER ORDER.

## 2020-04-16 NOTE — NUR
Pt in bed, helps with repositon, awake, alert and oriented, on room air, PICC
line hep locked JAYA. echymotic areas upper and lower arm/wrist area, scabbed
alex over both arms, mid and low abd, red ruel frontalarea, above knees amd
knee areas, and calves areas, amd back. f/c in place, scds in place,

## 2020-04-16 NOTE — NUR
PT COMPLAINS THAT HIS DINNER WAS NOT GOOD BUT DECLINES ANY ADDITIONAL FOOD
OTHER THAN CHEESE STICKS WHICH THE KITCHEN ALREADY STATED WOULD PUT HIM OVER
HIS LIMIT FOR SODIUM. THE PT DECLINES ANYTHING ELSE TO EAT. PT HELPED TO
REPOSITION AND HE WAS GIVEN A FEW WARM BLANKETS AS HE STATES HE IS COLD. HE
DENIES ANY OTHER PROBLMES AT THIS TIME.

## 2020-04-16 NOTE — NUR
Spoke with Kenneth for CM initial eval.  Pt states daughter was living with
him but moved out. Last hospitalization, daughter was assisted by police to
remove addicts
from pt's home.  Daughter moved in to assist him.  He states today she
has moved as as she was a "bitch".  States the Dr's at the hospital
clinic are "assholes' and won't see him. Thinks [facility] maynot see him also as he
missed to many appts.  He wants a PCP. Refuses to go to SNF, states he has
state paid care giver but has not seen in a while as she is seeing other pt's
also.  Pt uses a walker and a shower chair.
 
 
Appt. scheduled with Dr. Doyle Wang for April 30 at 10:00.   Notified
office pt does not want any computerized notifications as he doesn't
understand how to open.  Will check with [facility] tomorrow to see what  services
pt is using.

## 2020-04-16 NOTE — NUR
PATIENT TO TRANSFER TO MEDICAL FLOOR. BED BATH GIVEN AND LINEN CHANGED, NEW
GOWN PROVIDED. 300 ML OF URINE SINCE 20 MG LASIX IV GIVEN. PT TAKEN OFF
MONITOR AND NO ORDER FOR TELE. REPORT TO BE GIVEN.

## 2020-04-16 NOTE — NUR
PT C/O GENERALIZED PAIN, MEDICATED WITH OXYCODONE 5MG PO 9-10/10 PAIN. MOANING
AND GROANING PRESENT. HELPED WITH REPOSITIONING. 
RECEIVED 5 UNITS SS INSULIN. NOT VERY RECEPTIVE TO TEACHING R/T MEDS, S/S
HYPO/HYPERGLYCEMIA, ISOLATIN PRECAUTIONS, ATTENDS/F/C. CONTINUE TO REINFORCE
POC AND PRECAUTIONS. SCDS AND HEEL PROTECTORS IN PLACE. F/C STILL DRAINING LOW
URINARY OUTPUT. DR VARGAS NOTIFIED VERBALLY EARLIER BY ME, NO NEW ORDERS. BED
ALARM ON, FRESH WATER, TV REMOTE CONTROL AND CALL LIGHT AT HANDS REACH

## 2020-04-17 NOTE — NUR
spoke to dr puri regarding current lantus orders. orders for subq lantus 10
units at bedside.
pt received 10 units lantus
at approx 1300. pt has 10 units lantus scheduled for 2100. per dr puri, order
intentional and okay to give 10 units scheduled lantus for 2100.
 dr puri made aware of
trending accuchecks.

## 2020-04-17 NOTE — NUR
STARTED MORNING ASSESSMENT, PATIENT BG .  PATIENT REPOSITIONED.
BREAKFAST HAS NOT ARRIVED AT THIS TIME.

## 2020-04-17 NOTE — NUR
Received return message from Ada at [facility].  Message stating they
do see Kenneth and he is see daily, 7 days a week for a few hours.  She
requests we notify them when pt will be discharged and they will set up cg for
this weekend, if he goes home today.  Charge nurse notified and [facility]
phone number given.

## 2020-04-17 NOTE — NUR
PT C/O 8/10 GENERALIZED PAIN, MOAING AND GROANING , ANXIOUS, EASILY REDIRECTED
AND REASSURED. MEDICATED WITH OXYCODONE 5MG PO. PT INCONTINENT OF LARGE AMOUNT
OF SOFT BM. SKIN CARE DONE. BARRIER CREAM PLLIED, CLEAN ATTENDS IN PLACE, 3
BEDCOVERS REMOVED EARLIER PLACED BACK ON AND WARM BLANKET GIVEN. COOPERATIVE
AND HELPFUL, TOLERATING SIPS OF WATER

## 2020-04-17 NOTE — NUR
Pt moaning and groaning, declines need to medicated, turns self in bed, on
room air, no c/o aob , tolerating fluids well no c/o n/v, scds, heel
protectors in place, bed alarm on, continues on droplet and isolation
precautions

## 2020-04-17 NOTE — NUR
PT ATE 25% OF LUNCH, SECOND CUP OF STRAWBERRIES AND BLUBERRIES ORDERED FROM
KITCHEN, NEW INSULIN ORDERS NOTED.

## 2020-04-17 NOTE — NUR
Spoke with Kenneth and attempted to confirm if he does have cg in the home.
He states he does, does not know their names or where they are from.  Informed
I will call [facility] and find out if they know.
 
Called and spoke with [facility].  They state pt 54 hours of cg q 2 weeks and his cg
are from [facility].
Called [facility] and left a message, requested return call to see if they
are sending cg as pt's states he has not seen in several weeks.

## 2020-04-17 NOTE — NUR
Pt contiues on droplet-contact isolation. On room air, lung dim at bases,
ocassinal dry non productive cough. Irritable, anxious affect, easily
redirectable. Has multiple scabbed over and bruised areas all over body in
different stages of healing. allevyn dressing l heel and r shin. scds and heel
protectors in place. JAYA PICC line patent. has f/c and low urinary output, Dr Brown aware. Pt tolerating fluids well, Pt turns self in bed, has chronic
genealized pain and was medicated 2x with Oxycodone 5mg po with good to fair
relief, has slept off and on approx 3-4 hrs. easily redirectable. no c/o or
s/se witdrawals. was incontinent of lg bm x1.

## 2020-04-17 NOTE — NUR
ASSESSMENT COMPLETE, SCHEDULED MEDS GIVEN (SEE EMAR) WITHOUT CONCERN. PT AWAKE
AND MOANING IN BED, REPORTS 11/10 GENERALIZED PAIN. PT INITIALLY UNABLE TO
LOCATE PAIN. PRN OXYCODONE ADMINISTERED. PICC LINE SITE WNL, BLOOD RETURN
NOTED. SCHEDULED IV ALBUMIN ADMINISTERED AT 60MLS/HR, PER CLINICAL
PHARMACOLOGY. PT DENIES SOB AND CHEST PAIN. NO DISTRESS NOTED, WILL MONITOR
FOR CHANGES. SCD'S AND HEEL PROTECTORS IN PLACE. PT REPOSITIONED IN BED, NO
FURTHER NEEDS, CALL LIGHT IN REACH.

## 2020-04-17 NOTE — NUR
SHIFT REPORT RECEIVED FROM DAYSHIFT SAWYER CALDERA AT BEDSIDE. PT AWAKE AND RESTING
IN BED, DAYSHIFT REBECCA AN IN ROOM TO HELP WITH LINEN CHANGE. NO FURTHER NEEDS
VERBALIZED, CALL LIGHT IN REACH.

## 2020-04-17 NOTE — NUR
SANDY LUNA DC, PT ATE FEW BITES OF BREAKFAST, TAKING FLUIDS BETTER. SCOPE
CANCELLED, REPOSITIONED FOR COMFORT, WATCHING TV PROGRAM. CALL LIGHT IN EASY
REACH AND ENCOURAGED.

## 2020-04-17 NOTE — NUR
pt was taken off bed pan. VS and I&Os taken. Fresh water given.
Nothing further needed at this
time.

## 2020-04-17 NOTE — NUR
PT MOANING AND GROANING, DECLINED OXYCODONE WHEN TAKEN TO ROOM. PT
REPOSITIONED IN BED, ON ROOM AIR, F/C PATENT DRAINING 150CC DARK YELLOW URINE.
JAYA PICC LINE PATENT, SCDS AND HEEL PROTECTORS IN PLACE, PT TURNS SELF IN BED.
COOP WITH ASSESSMENT. BED ALARM ON. TOLERATING FLUIDS WELL, CALL LIGHT AT
HANDS REACH,

## 2020-04-17 NOTE — NUR
PT NOT WANTING TO EAT,  "JUST LET ME SLEEP." HAS NOT VOIDED, BLADDER SCANNED
. ENCOURAGING PT TO DRINK FLUIDS, CALL PLACED TO DR VARGAS ABOUT NO VOID
YET. ASKED WE GIVE HIM ANOTHER HOUR.

## 2020-04-17 NOTE — NUR
LAYING ON L SIDE, RESTING, EYES CLOSED, RESP NORMAL, UNLABORED, SCDS IN PLACE.
PT ON ROOM AIR, CALL LIGHT AND FLUDS AT HANDS REACH

## 2020-04-18 NOTE — NUR
ASSESSMENT COMPLETE, SCHEDULED MEDS GIVEN (SEE EMAR). PT RESTING QUIETLY IN
BED WITH EYES CLOSED. PT INITIALLY STARTLED BY NURSING STAFF. PT AWOKE EASILY
AND IN GOOD SPIRITS WHILE EATING A LATE DINNER. VSS, PT ASSISTED WITH URINAL
WITH HELP FROM REBECCA SOLANO. REPORTING 10/10 PAIN, PRN OXYCODONE ADMINISTERED.
GENERALIZED SWELLING NOTED TO PENIS/SCROTUM, SITE ELEVATED WITH TOWEL SLING.
PT VOIDING, NO DIFFCIULTY VERBALIZED. WILL CONTINUE TO MONITOR. SCD'S OFF PER
PT REQUEST D/T NERVE PAIN. ALLEVYN TO RLE INTACT, SCANT PURVULENT SHADOWING
NOTED. NO FURTHER NEEDS, CALL LIGHT IN REACH, BED ALARM ON FOR SAFETY.

## 2020-04-18 NOTE — NUR
WITH THE HELP OF SAWYER BONE WE GOT PT OFF THE BEDPAN AND CLEANED HIM UP AFTER
HER HAD A LARGE BM. REPOSITIONED PT IN BED TO HIS COMFORT. HEEL PROTECTORS AND
SCD,S ON. BEDSIDE TABLE AND CALL LIGHT IN REACH. WE EXPLAINED TO HIM THAT HE
NEEDS TO USE HIS CALL LIGHT INSTEAD OF YELLING OUT. HE SAID OK. BED ALARM SET.
PT NEEDS NOTHING MORE AT THIS TIME.

## 2020-04-18 NOTE — NUR
THIS RN IN ROOM AFTER HEARING PT YELLING OUT ASKING FOR HELP. EDUCATION
PROVIDED ON IMPORTANCE TO USE CALL LIGHT, PT VERBALIZED UNDERSTANDING. PT
PLACED ON BEDPAN PER PT REQUEST. IV ALBUMIN INFUSING, SLIGHT SWELLING NOTED
NEAR INNER RIGHT AC, BELOW PICC LINE SITE. BLOOD RETURN NOTED. WILL CONTINUE
TO MONITOR, PT DENIES PAIN AT SITE. SECOND RN ELISA IN ROOM TO ASSESS. RUE
ELEVATED WITH PILLOW. PT INSTRUCTED TO USE CALL LIGHT WHEN BM IS DONE, PT
VERBALIZED UNDERSTANDING. CALL LIGHT IN REACH, BED ALARM ON FOR SAFETY.

## 2020-04-18 NOTE — NUR
PT RESTING COMFORTABLY IN BED WITH EYES CLOSED. RESPIRATIONS EVEN AND
UNLABORED. NO DISTRESS, MOANING, OR SIGNS OF PAIN NOTED AT THIS TIME. IV
ALBUMIN INFUSING, SITE WNL. CALL LIGHT IN REACH, BED ALARM ON FOR SAFETY.

## 2020-04-18 NOTE — NUR
PT RESTING SUPINE IN BED ALERT AND ORIENTED. CALL LIGHT AND H2O IN REACH. AM
MEDS ADMINISTERED AND ASSESSMENT COMPLETED. NO NEEDS OR CONCERNS VOICED.

## 2020-04-18 NOTE — NUR
SWELLING NOTED TO PT'S PENIS, PT HAS VOIDED 300MLS RECENTLY AS WELL. DR AGUILA
NOTIFIED AND PER MD REQUEST WILL HOLD PT'S FLUID BOLUS. NO FURTHER ORDERS
RECEIVED AT THIS TIME. PT TOLERATING PO FLUIDS WELL.

## 2020-04-18 NOTE — NUR
PT HAD AN UNEVENTFUL NIGHT, SLEPT ON AND OFF. YELLED OUT FOR HELP AND REQUIRED
MULTIPLE EDUCATION REGARDING USE OF CALL LIGHT. VSS, PT REORIENTS EASILY.
FORGETFUL AT TIMES. BED ALRM ON FOR SAFETY.
WC BOUND, SCD'S AND HEEL PROTEECTORS IN PLACE. PAIN
CONTROLLED WITH PRN OXYCODONE. PICC LINE SITE WNL, BLOOD RETURN NOTED. 400MLS
UO FOR TOTAL SHIFT, DECREASED APPETITE, PRN ZOFRAN GIVEN X1. MULTIPLE BM'S,
SCHEDULED LACTULOSE.

## 2020-04-18 NOTE — NUR
ASSESSMENT COMPLETE, NO NEW CHANGES OR CONCERNS. PT A/O, REPORTS GENERALIZED
PAIN, BUT DOES NOT RATE. PT REPOSITIONED IN BED, HEEL PROTECTORS AND SCD'S IN
PLACE. PT SWEARING AND MUMBLING TO SELF. ANSWERS QUESTIONS, BUT THEN TRAILS
OFF. PRN ACCUCHECK RESULT . PICC LINE SALINE AND HEP LOCKED, DRESSING
SITE WNL. NO CHANGE IN EDEMA TO RIGHT AC (SEE PREVIOUS RN NOTE). PT DENIES
SOB, DYSPNEA, OR CHEST PAIN AT THIS TIME. CALL LIGHT IN REACH, BED ALARM
ON FOR
SAFETY. PT AGAIN EDUCATED ON USE OF CALL LIGHT, PT APOLOGIZED FOR YELLING OUT
AND VERBALIZED UNDERSTANDING.

## 2020-04-18 NOTE — NUR
SHIFT REPORT RECEIVED FROM DAYSHIFT SAWYER BIANCHI AT BEDSIDE. PT RESTING QUIETLY IN
BED, EYES CLOSED. RR EVEN AND UNLABORED. NO DISTRESS NOTED. PICC LINE HEP
LOCKED BY SAWYER BIANCHI. BLOOD RETURN NOTED BY THIS RN. CALL LIGHT IN REACH.

## 2020-04-18 NOTE — NUR
PT HEARD MOANING AND GROANING FROM NURSE'S STATION. SWEARING TO SELF, REPORTS
CONTINUED 10/10 NERVE PAIN. TELEPHONE ORDER READ BACK FOR 100MG PO TRAZODONE
HS.

## 2020-04-18 NOTE — NUR
VITALS AND I&OS DONE AND CHARTED. WITH THE HELP OF REBECCA SOLANO WE GOT PT
CLEANED UP AFTER USING THE BEDPAN. BEDSIDE TABLE AND CALL LIGHT IN REACH. PT
WOULD LIKE TO GO BACK TO SLEEP NOW.

## 2020-04-18 NOTE — NUR
LAB DRAW COMPLETE PER POLICY. FLUSHED W/ 20MLS NORMAL SALINE. PT HAS SCHEDULED
0900 IV ABX AND ACHEDULED HEP LOCK. WILL NOTIFY DAYSHIFT RN THAT SITE IS
SALINE LOCKED.

## 2020-04-18 NOTE — NUR
VS, BS check and I&Os complete. pt urinated 275 @ this time. fresh water
given. dinner was re-heated for pt and more fruit was retrieved, nothing
further needed at this time.

## 2020-04-18 NOTE — NUR
THIS RN IN ROOM TO ANSWER CALL LIGHT. ASSISTED PT WITH USE OF BED AND
CONTROLLING HEAD ELEVATION. WARM BLANKETS X2 PROVIDED. PENIS/SCROTUM REMAINS
ELEVATED. NO FURTHER NEEDS, CALL LIGHT IN REACH. BED ALARM ON FOR SAFETY.

## 2020-04-19 NOTE — NUR
PT RESTING IN SEMIFOWLERS POSITION IN BED. PT IS ALERT AND ORIENTED X3. ROOM
AIR O2 SAT 92% AND PT DENIES SOB. AM MEDS ADMINISTERED. PT REQUESTED
ASSISTANCE USING URINAL. WHEN I REACHED TO MOVE THE BLANKET THAT WAS
DRAPED OVER PATIENTS LAP IN ORDER TO ASSIST HIM WITH URINAL PATIENT
YELLS "GOD DAMN YOU THAT HURTS!" PT STATES HIS SKIN IS SENSITIVE.
 WHEN ASKED PATIENT IF HE WOULD LIKE TO DO IT HIMSELF INSTEAD PATIENT
YELLS LOUDLY "NO, I WANT YOU TO COME HERE AND DO IT SO I CAN PUNCH YOU IN
THE FACE!". NO NEW REDNESS OR SWELLING OR SKIN BREAKDOWN NOTED TO SAMMY AREA,
PT ASSISTED WITH URINAL. PT CONTINUES TO MAKE PROVOCATIVE STATEMENTS. "I HATE
ALL YOU ASSHOLES AND WISH I COULD JUST GO HOME".  FRESH ICE WATER AT BEDSIDE
AND PT PROVIDED WITH CHOCOLATE ENSURE. MD NOTIFIED OF PT'S BEHAVIOR AND OF HIS
DESIRE TO DISCHARGE. CALL LIGHT IN REACH.

## 2020-04-19 NOTE — NUR
Called and updated [facility], plan for pt to discharge in 25 min.  She
states she cannot get a cg in place that soon.  Informed I will have the
charge nurse cancel transport and asked she call me when they have cg ready to
go.
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
## 2020-04-19 NOTE — NUR
o2 sat spot checked, pt o2 sat 80's on ra. pt placed on 2lnc and quickly
titrated to 0.5lnc. o2 sat maintaining 92-93%. pt medicated recently with
trazodone, will monitor. rr wnl, no distress noted. call light in reach.

## 2020-04-19 NOTE — NUR
PT RESTING QUIETLY IN BED WITH EYES CLOSED. RESPIRATIONS EVEN AND UNLABORED,
NO DISTRESS OR SIGNS OF PAIN NOTED. PT APPEARS COMFORTABLE AND RELAXED. CALL
LIGHT IN REACH, BED ALARM REMAINS IN REACH.

## 2020-04-19 NOTE — NUR
Call from [facility].  They are unable to contact Howard cg.  They will
send a different cg, she is unable to be there until 12n.  Updated about the
wc and need to transfer the pt.  Notified I will tell the Charge Rn, cg will
be inplace at 12n and will request transport at that time.

## 2020-04-19 NOTE — NUR
PT INCNTINENT OF LARGE LIQUID STOOL. WITH HELP FROM PETTY STUDENT RN, PT
CLEANED UP WITH NEW ATTENDS IN PLACE. PARTIAL BED CHANGE, NEW GOWN AND WARM
BLANKETS GIVEN. ASSESSMENT COMPLETE, SEE INTERVENTION FOR DETAILS. HEEL
PROTECTORS IN PLACE. CALL LIGHT IN REACH, BED ALARM ON FOR SAFETY.

## 2020-04-19 NOTE — NUR
Called and updated Isha Gregg.  She will cancel transport.  She states they
had discussed with [facility] yesterday pt's wc.  It is at the home.  There
is no one to bring the wc.  Plan for pt to use our wc to home, CG will meet pt
there and they can transfer into his wc and transport will return our wc to
the hospital.

## 2020-04-19 NOTE — NUR
Spoke with Dr. Brown.  He plans on dc of this pt. today with HH.  Spoke with
Kenneth and he would [facility] for his HH agency.  He is upset as he states it
isn't a good plan to send him home without a cg.  I called [facility] and
they have had a cg on stand by since yesterday as they were notified he would
go home yesterday.  Apologized and informed I will find out the time the pt
will dc.

## 2020-04-19 NOTE — NUR
LAB DRAW COMPLETE AND SENT TO LAB. SITE SALINE FLUSHED AND HEP LOCKED PER
POLICY. SITE WNL. PT IRRITABLE WITH RN STAFF IN ROOM. ABLE TO VOID 150MLS
GENI URINE. NO FURTHER NEEDS, CALL LIGHT IN REACH. 2LNC IN PLACE, NO DISTRESS
NOTED.

## 2020-04-19 NOTE — NUR
Updated Bell Vieira.  She will schedule transport and request the 
return our wc.  Asked her to call [facility] if the plan changes so their
cg isn't waiting.  Phone number given [phone].

## 2020-04-20 NOTE — NUR
Notified by staff, pt returned to the ER after dc yesterday.  Per HH cg pt was
unable to get off the toilet and she couldn't get any other staff to help her.
He returned to the ER and was sent home.
 
I attempted to call this am, and unable to contact on his phone.  Received
call from Zoie Rendon and she has pt. cg from [facility].
[phone]  He sees Cesar OSWALD,TH, and F.
 
Called CG and he let me speak with Cesar.  Cesar declines a SNF.  Wants to stay
home with his cgs.  Have ordered HH from [facility] per Cecelia request.  Ray gave
permission for phone number to be given to [facility] as pt rarely has his phone on.
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
## 2020-04-20 NOTE — NUR
Called and spoke with Nickie Banuelos [facility]. Updated appt. have been moved up to
tomorrow.  Let her know, cg from [facility] will accompany Kenneth to his
appt.  She will proceed with starting HH.

## 2020-04-20 NOTE — NUR
Pt dc to home yesterday.  Orders for HH. Progress notes, H&P, DC summary,
PT/OT eval and notes sent to [facility] per pt's request.  Phone number for pt's cg
from [facility] given; Ray Eng  [phone].  Pt has FU appt
scheduled with Vivian Borges 4/23/20 10:00.  Pt has an appt 4/30/20 at 10:00 to
establish care with Dr. Doyle Wang.  Called Ray and updated to make sure
they understand about their appoints and they both state understanding.
Numbers and appointments given to HH when chart was scanned to [facility].

## 2020-04-21 NOTE — NUR
Received a call from Katie and then Claudine from [employer].  Pt is stating
he cannot live alone.  He is unsafe to stay at home.  He would like to go to
SNF.
Called x 3 and he is not answering his phone.  Contacted Claudine and she is at
his home.  Was able to speak with Kenneth.  He states he is to week to stay
alone.  He has cg for 4 hours per day.  Informed I will contact the 3 SNFs in
the area to see if they have bed availability.  Informed I am not sure if I
can get him placed today, but I will try.  He states he understands.
Requested cg assist him to turn on his phone and turn the sound up so he can
answer when I call.
He states he will go to any SNF which has a bed available.  He prefers [facility].
 
CAlled and spoke with Khari.  She request I send a chart for review.  ER note,
H&P, progress notes, dc summary face sheet, and ER admission note
following dc faxed to [facility].
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
## 2020-07-06 NOTE — XMS
Encounter Summary
  Created on: 2020
 
 Kenneth Delgado
 External Reference #: [id number]
 : [birth year]
 Sex: Male
 
 Demographics
 
 
+-----------------------+----------------------+
| Address               | [address]    |
|                       | [place]  [address] |
+-----------------------+----------------------+
| Home Phone            | [phone]      |
+-----------------------+----------------------+
| Preferred Language    | Unknown              |
+-----------------------+----------------------+
| Marital Status        |              |
+-----------------------+----------------------+
| Latter day Affiliation | 1001                 |
+-----------------------+----------------------+
| Race                  | Unknown              |
+-----------------------+----------------------+
| Ethnic Group          | Unknown              |
+-----------------------+----------------------+
 
 
 Author
 
 
+--------------+--------------------------------------------+
| Author       | [facility]  |
|              | [facility]                                |
+--------------+--------------------------------------------+
| Organization | [facility]  |
|              | [facility]                                |
+--------------+--------------------------------------------+
| Address      | Unknown                                    |
+--------------+--------------------------------------------+
| Phone        | Unavailable                                |
+--------------+--------------------------------------------+
 
 
 
 Support
 
 
+--------------------+--------------+---------+-----------------+
| Name               | Relationship | Address | Phone           |
+--------------------+--------------+---------+-----------------+
| Amy Delgado | ECON         | Unknown | [phone] |
+--------------------+--------------+---------+-----------------+
 
 
 
 Care Team Providers
 
 
 
+-----------------------+------+-----------------+
| Care Team Member Name | Role | Phone           |
+-----------------------+------+-----------------+
| Omega Yu MD     | PCP  | [phone] |
+-----------------------+------+-----------------+
 
 
 
 Encounter Details
 
 
+--------+----------+----------------------+----------------------+-------------+
| Date   | Type     | Department           | Care Team            | Description |
+--------+----------+----------------------+----------------------+-------------+
| 02/10/ | Imaging  |   ZACARIAS GUY |   Provider,          |             |
| 2020   | Exam     |  [facility]    | [facility]  [id number] |             |
|        |          | IMAGING  [address]       |  [address]        |             |
|        |          | [address]  [place]     | [place] [id number]     |             |
|        |          | [place] [id number] |                      |             |
|        |          |   [phone]       |                      |             |
+--------+----------+----------------------+----------------------+-------------+
 
 
 
 Social History
 
 
+---------------+------------+-----------+--------+------------------+
| Tobacco Use   | Types      | Packs/Day | Years  | Date             |
|               |            |           | Used   |                  |
+---------------+------------+-----------+--------+------------------+
| Former Smoker | Cigarettes |           |        | Quit: 1995 |
+---------------+------------+-----------+--------+------------------+
 
 
 
+---------------------+---+---+---+
| Smokeless Tobacco:  |   |   |   |
| Never Used          |   |   |   |
+---------------------+---+---+---+
 
 
 
+---------------+-------------+---------+-------------------+
| Alcohol Use   | Drinks/Week | oz/Week | Comments          |
+---------------+-------------+---------+-------------------+
| Not Currently |             |         | quit 6 months ago |
+---------------+-------------+---------+-------------------+
 
 
 
+------------------+---------------+
| Sex Assigned at  | Date Recorded |
| Birth            |               |
+------------------+---------------+
| Not on file      |               |
+------------------+---------------+
 documented as of this encounter
 
 
 Plan of Treatment
 Not on filedocumented as of this encounter
 
 Procedures
 
 
+-----------------+--------+-------------+----------------------+----------------------+
| Procedure Name  | Priori | Date/Time   | Associated Diagnosis | Comments             |
|                 | ty     |             |                      |                      |
+-----------------+--------+-------------+----------------------+----------------------+
| XR CHEST 1 VIEW | Routin | 2020  |                      |   Results for this   |
|                 | e      |  3:05 PM    |                      | procedure are in the |
|                 |        | PST         |                      |  results section.    |
+-----------------+--------+-------------+----------------------+----------------------+
 documented in this encounter
 
 Results
 XR Chest 1 Vw (2020  3:05 PM PST)
 
+----------+
| Specimen |
+----------+
|          |
+----------+
 
 
 
+-----------------------------------------+---------------+
| Narrative                               | Performed At  |
+-----------------------------------------+---------------+
|   External films for comparison only    |   [facility] |
|                                         |               |
|  No results will be in the chart.       |               |
+-----------------------------------------+---------------+
 
 
 
+---------------+---------+--------------------+--------------+
| Performing    | Address | City/State/Zipcode | Phone Number |
| Organization  |         |                    |              |
+---------------+---------+--------------------+--------------+
|   [facility] |         |                    |              |
+---------------+---------+--------------------+--------------+
 documented in this encounter
 
 Visit Diagnoses
 Not on filedocumented in this encounter

## 2020-07-06 NOTE — XMS
Clinical Summary
  Created on: 2020
 
 Kenneth Delgado
 External Reference #: [id number]
 : [birth year]
 Sex: Male
 
 Demographics
 
 
+-----------------------+----------------------+
| Address               | [address]    |
|                       | [place]  [address] |
+-----------------------+----------------------+
| Home Phone            | [phone]      |
+-----------------------+----------------------+
| Preferred Language    | Unknown              |
+-----------------------+----------------------+
| Marital Status        |              |
+-----------------------+----------------------+
| Oriental orthodox Affiliation | 1001                 |
+-----------------------+----------------------+
| Race                  | Unknown              |
+-----------------------+----------------------+
| Ethnic Group          | Unknown              |
+-----------------------+----------------------+
 
 
 Author
 
 
+--------------+--------------------------------------------+
| Author       | [facility]  |
|              | [facility]                                |
+--------------+--------------------------------------------+
| Organization | [facility]  |
|              | [facility]                                |
+--------------+--------------------------------------------+
| Address      | Unknown                                    |
+--------------+--------------------------------------------+
| Phone        | Unavailable                                |
+--------------+--------------------------------------------+
 
 
 
 Support
 
 
+--------------------+--------------+---------+-----------------+
| Name               | Relationship | Address | Phone           |
+--------------------+--------------+---------+-----------------+
| Amy Delgado | ECON         | Unknown | [phone] |
+--------------------+--------------+---------+-----------------+
 
 
 
 Care Team Providers
 
 
 
+-----------------------+------+-----------------+
| Care Team Member Name | Role | Phone           |
+-----------------------+------+-----------------+
| Omega Yu MD     | PCP  | [phone] |
+-----------------------+------+-----------------+
 
 
 
 Allergies
 No Known Allergies
 
 Medications
 
 
+----------------------+----------------------+-----------+---------+------+------+-------+
| Medication           | Sig                  | Dispensed | Refills | Star | End  | Statu |
|                      |                      |           |         | t    | Date | s     |
|                      |                      |           |         | Date |      |       |
+----------------------+----------------------+-----------+---------+------+------+-------+
|   tamsulosin         | Take 0.8 mg by mouth |           | 0       |      |      | Activ |
| (FLOMAX) 0.4 mg CAPS |  daily (after        |           |         |      |      | e     |
|                      | breakfast).          |           |         |      |      |       |
+----------------------+----------------------+-----------+---------+------+------+-------+
|   nicotine           | Place 1 patch onto   |           | 0       |      |      | Activ |
| (NICODERM) 14 mg/24  | the skin every 24    |           |         |      |      | e     |
| hr                   | hours.               |           |         |      |      |       |
+----------------------+----------------------+-----------+---------+------+------+-------+
|   sucralfate         | Take 1 g by mouth 4  |           | 0       |      |      | Activ |
| (CARAFATE) 1 g       | times daily (before  |           |         |      |      | e     |
| tablet               | meals and nightly).  |           |         |      |      |       |
+----------------------+----------------------+-----------+---------+------+------+-------+
|   pantoprazole       | Take 40 mg by mouth  |           | 0       |      |      | Activ |
| (PROTONIX) 40 mg     | 2 times daily        |           |         |      |      | e     |
| tablet               | (before meals).      |           |         |      |      |       |
+----------------------+----------------------+-----------+---------+------+------+-------+
|   insulin glargine   | Inject 15 Units      |           | 0       |      |      | Activ |
| (LANTUS) 100         | under the skin       |           |         |      |      | e     |
| units/mL injection   | nightly.             |           |         |      |      |       |
| (vial)               |                      |           |         |      |      |       |
+----------------------+----------------------+-----------+---------+------+------+-------+
|   magnesium oxide    | Take 400 mg by mouth |           | 0       |      |      | Activ |
| (MAG-OX) 400 mg      |  2 times daily.      |           |         |      |      | e     |
| tablet               |                      |           |         |      |      |       |
+----------------------+----------------------+-----------+---------+------+------+-------+
|   traZODone          | Take 100 mg by mouth |           | 0       |      |      | Activ |
| (DESYREL) 100 mg     |  nightly.            |           |         |      |      | e     |
| tablet               |                      |           |         |      |      |       |
+----------------------+----------------------+-----------+---------+------+------+-------+
|   folic acid 1 mg    | Take 1 mg by mouth   |           | 0       |      |      | Activ |
| tablet               | Daily.               |           |         |      |      | e     |
+----------------------+----------------------+-----------+---------+------+------+-------+
|   lisinopril         | Take 5 mg by mouth   |           | 0       |      |      | Activ |
| (PRINIVIL, ZESTRIL)  | Daily.               |           |         |      |      | e     |
| 5 mg tablet          |                      |           |         |      |      |       |
+----------------------+----------------------+-----------+---------+------+------+-------+
|   DULoxetine         | Take 30 mg by mouth  |           | 0       |      |      | Activ |
| (CYMBALTA) 30 mg DR  | Daily.               |           |         |      |      | e     |
| capsule              |                      |           |         |      |      |       |
 
+----------------------+----------------------+-----------+---------+------+------+-------+
|   atorvaSTATin       | Take 40 mg by mouth  |           | 0       |      |      | Activ |
| (LIPITOR) 40 mg      | nightly.             |           |         |      |      | e     |
| tablet               |                      |           |         |      |      |       |
+----------------------+----------------------+-----------+---------+------+------+-------+
|   aspirin 81 mg EC   | Take 81 mg by mouth  |           | 0       |      |      | Activ |
| tablet               | Daily.               |           |         |      |      | e     |
+----------------------+----------------------+-----------+---------+------+------+-------+
|   traMADol (ULTRAM)  | Take 1 tablet by     |   20      | 0       | 02/1 |      | Activ |
| 50 mg tablet         | mouth every 6 hours  | tablet    |         | 0/20 |      | e     |
|                      | as needed for Pain.  |           |         | 20   |      |       |
+----------------------+----------------------+-----------+---------+------+------+-------+
|   furosemide (LASIX) | Take 1 tablet by     |           | 0       | 02/1 |      | Activ |
|  40 mg tablet        | mouth Daily.         |           |         | 0/20 |      | e     |
|                      |                      |           |         | 20   |      |       |
+----------------------+----------------------+-----------+---------+------+------+-------+
|   spironolactone     | Take 1 tablet by     |           | 0       | 02/1 |      | Activ |
| (ALDACTONE) 100 MG   | mouth Daily.         |           |         | 0/20 |      | e     |
| tablet               |                      |           |         | 20   |      |       |
+----------------------+----------------------+-----------+---------+------+------+-------+
|   dicyclomine        | Take 2 capsules by   |   240     | 0       | 02/1 |      | Activ |
| (BENTYL) 10 mg       | mouth 4 times daily  | capsule   |         | 0/20 |      | e     |
| capsule              | as needed for GI     |           |         | 20   |      |       |
|                      | Upset.               |           |         |      |      |       |
+----------------------+----------------------+-----------+---------+------+------+-------+
|   loperamide         | Take 1 capsule by    |           | 0       | 02/1 |      | Activ |
| (IMODIUM) 2 mg       | mouth every 3 hours  |           |         | 0/20 |      | e     |
| capsule              | as needed for        |           |         | 20   |      |       |
|                      | Diarrhea.            |           |         |      |      |       |
+----------------------+----------------------+-----------+---------+------+------+-------+
|   insulin aspart     | Inject 5 Units under |           | 0       | 02/1 |      | Activ |
| (NOVOLOG) 100        |  the skin 3 times    |           |         | 0/20 |      | e     |
| units/mL injection   | daily (before        |           |         | 20   |      |       |
|                      | meals).              |           |         |      |      |       |
+----------------------+----------------------+-----------+---------+------+------+-------+
|   hydrOXYzine        | Take 25 mg by mouth  |           | 0       |      |      | Activ |
| pamoate (VISTARIL)   | 3 times daily as     |           |         |      |      | e     |
| 25 mg capsule        | needed for Anxiety.  |           |         |      |      |       |
+----------------------+----------------------+-----------+---------+------+------+-------+
|   omeprazole         | Take 20 mg by mouth  |           | 0       |      |      | Activ |
| (PRILOSEC) 20 mg     | every morning        |           |         |      |      | e     |
| capsule              | (before breakfast).  |           |         |      |      |       |
+----------------------+----------------------+-----------+---------+------+------+-------+
 
 
 
 Active Problems
 
 
+---------------------------------------------+------------+
| Problem                                     | Noted Date |
+---------------------------------------------+------------+
| Physical deconditioning                     | 02/10/2020 |
+---------------------------------------------+------------+
| Elevated troponin                           | 2020 |
+---------------------------------------------+------------+
| Cirrhosis of liver with ascites             | 2020 |
+---------------------------------------------+------------+
| Diabetes mellitus type 2, insulin dependent | 2020 |
+---------------------------------------------+------------+
 
| Essential hypertension                      | 2020 |
+---------------------------------------------+------------+
 
 
 
 Family History
 
 
+-------------------+----------+------+----------+
| Medical History   | Relation | Name | Comments |
+-------------------+----------+------+----------+
| No known problems | Father   |      |          |
+-------------------+----------+------+----------+
| No known problems | Mother   |      |          |
+-------------------+----------+------+----------+
 
 
 
+----------+------+--------+----------+
| Relation | Name | Status | Comments |
+----------+------+--------+----------+
| Father   |      |        |          |
+----------+------+--------+----------+
| Mother   |      |        |          |
+----------+------+--------+----------+
 
 
 
 Social History
 
 
+---------------+------------+-----------+--------+------------------+
| Tobacco Use   | Types      | Packs/Day | Years  | Date             |
|               |            |           | Used   |                  |
+---------------+------------+-----------+--------+------------------+
| Former Smoker | Cigarettes |           |        | Quit: 1995 |
+---------------+------------+-----------+--------+------------------+
 
 
 
+---------------------+---+---+---+
| Smokeless Tobacco:  |   |   |   |
| Never Used          |   |   |   |
+---------------------+---+---+---+
 
 
 
+---------------+-------------+---------+-------------------+
| Alcohol Use   | Drinks/Week | oz/Week | Comments          |
+---------------+-------------+---------+-------------------+
| Not Currently |             |         | quit 6 months ago |
+---------------+-------------+---------+-------------------+
 
 
 
+------------------+---------------+
| Sex Assigned at  | Date Recorded |
| Birth            |               |
+------------------+---------------+
| Not on file      |               |
 
+------------------+---------------+
 
 
 
 Last Filed Vital Signs
 
 
+-------------------+---------------------+----------------------+----------+
| Vital Sign        | Reading             | Time Taken           | Comments |
+-------------------+---------------------+----------------------+----------+
| Blood Pressure    | 154/71              | 02/10/2020  8:00 AM  |          |
|                   |                     | PST                  |          |
+-------------------+---------------------+----------------------+----------+
| Pulse             | 82                  | 02/10/2020  8:00 AM  |          |
|                   |                     | PST                  |          |
+-------------------+---------------------+----------------------+----------+
| Temperature       | 36.4   C (97.5   F) | 02/10/2020  8:00 AM  |          |
|                   |                     | PST                  |          |
+-------------------+---------------------+----------------------+----------+
| Respiratory Rate  | 18                  | 02/10/2020  8:00 AM  |          |
|                   |                     | PST                  |          |
+-------------------+---------------------+----------------------+----------+
| Oxygen Saturation | 97%                 | 02/10/2020  8:00 AM  |          |
|                   |                     | PST                  |          |
+-------------------+---------------------+----------------------+----------+
| Inhaled Oxygen    | -                   | -                    |          |
| Concentration     |                     |                      |          |
+-------------------+---------------------+----------------------+----------+
| Weight            | 76 kg (167 lb 8.8   | 02/10/2020  6:27 AM  |          |
|                   | oz)                 | PST                  |          |
+-------------------+---------------------+----------------------+----------+
| Height            | 165.1 cm (5' 5")    | 2020  5:16 PM  |          |
|                   |                     | PST                  |          |
+-------------------+---------------------+----------------------+----------+
| Body Mass Index   | 27.88               | 2020  5:16 PM  |          |
|                   |                     | PST                  |          |
+-------------------+---------------------+----------------------+----------+
 
 
 
 Plan of Treatment
 
 
+----------------------+-----------+----------+----------+
| Health Maintenance   | Due Date  | Last     | Comments |
|                      |           | Done     |          |
+----------------------+-----------+----------+----------+
| Hepatitis C          | 10/02/195 |          |          |
| Screening            | 2         |          |          |
+----------------------+-----------+----------+----------+
| Diabetic Eye Exam    | 10/02/197 |          |          |
|                      | 0         |          |          |
+----------------------+-----------+----------+----------+
| Diabetic Foot Exam   | 10/02/197 |          |          |
|                      | 0         |          |          |
+----------------------+-----------+----------+----------+
| Vaccine:             | 10/02/197 |          |          |
| Dtap/Tdap/Td (1 -    | 1         |          |          |
| Tdap)                |           |          |          |
+----------------------+-----------+----------+----------+
 
| Colorectal Cancer    | 10/02/200 |          |          |
| Screening            | 2         |          |          |
| (Colonoscopy)        |           |          |          |
+----------------------+-----------+----------+----------+
| Vaccine: Zoster (1   | 10/02/200 |          |          |
| of 2)                | 2         |          |          |
+----------------------+-----------+----------+----------+
| AAA Screening        | 10/02/201 |          |          |
|                      | 7         |          |          |
+----------------------+-----------+----------+----------+
| Vaccine:             | 10/02/201 |          |          |
| Pneumococcal 65+ (1  | 7         |          |          |
| of 1 - PPSV23)       |           |          |          |
+----------------------+-----------+----------+----------+
| Adult Annual         |  |          |          |
| Wellness Visit       | 0         |          |          |
+----------------------+-----------+----------+----------+
| Hemoglobin A1c       |  | 20 |          |
| Screening            | 0         | 20       |          |
+----------------------+-----------+----------+----------+
| Vaccine: Influenza   |  |          |          |
| (#1)                 | 0         |          |          |
+----------------------+-----------+----------+----------+
 
 
 
 Results
 Not on filefrom Last 3 Months
 
 Insurance
 
 
+-------------------+--------+-------------+--------+-------------+---------+--------+
| Payer             | Benefi | Subscriber  | Effect | Phone       | Address | Type   |
|                   | t Plan | ID          | david    |             |         |        |
|                   |  /     |             | Dates  |             |         |        |
|                   | Group  |             |        |             |         |        |
+-------------------+--------+-------------+--------+-------------+---------+--------+
| MEDICARE          | MEDICA | [id number] | 3/1/20 | [phone] |         | [employer] |
|                   | RE     |             | 14-Pre | 5           |         | re     |
|                   | PART A |             | sent   |             |         |        |
|                   |  AND B |             |        |             |         |        |
+-------------------+--------+-------------+--------+-------------+---------+--------+
| MEDICARE          | MEDICA | [id number] | 3/1/20 | [phone] |         | [employer] |
|                   | RE     |             | 14-Pre | 5           |         | re     |
|                   | PART A |             | sent   |             |         |        |
|                   |  AND B |             |        |             |         |        |
+-------------------+--------+-------------+--------+-------------+---------+--------+
| [employer]  | MODA   | [id number]    | 20 | [phone] |         | [employer] |
| MEDICAID HMO      | HEALTH |             | 20-Pre | 1           |         | id     |
|                   |  MDCD  |             | sent   |             |         |        |
|                   | HMO OR |             |        |             |         |        |
+-------------------+--------+-------------+--------+-------------+---------+--------+
 
 
 
+----------------------+--------+-------------+--------+-------------+----------------------
+
| Guarantor Name       | Accoun | Relation to | Date   | Phone       | Billing Address      
|
 
|                      | t Type |  Patient    | of     |             |                      
|
|                      |        |             | Birth  |             |                      
|
+----------------------+--------+-------------+--------+-------------+----------------------
+
| Kenneth Delgado  | Person | Self        | [birth year]             |   [address]  
|
| [place]                 | al/Fam |             |    | [phone] |  [place] [address] 
|
|                      | swati    |             |        | 9 (Home)    |                      
|
+----------------------+--------+-------------+--------+-------------+----------------------
+
| Kenneth Delgado  | Person | Self        | [birth year] |             |   [address]  
|
| [place]                 | [place] |             | 2   | [phone] |  [place] [address] 
|
|                      | swati    |             |        | 9 (Home)    |                      
|
+----------------------+--------+-------------+--------+-------------+----------------------
+
 
 
 
 Advance Directives
 
 
+-----------+-----------------+----------------+-------------+
| Type      | Date Recorded   | Patient        | Explanation |
|           |                 | Representative |             |
+-----------+-----------------+----------------+-------------+
| Power of  |                 |                |             |
|   |                 |                |             |
+-----------+-----------------+----------------+-------------+
| Advance   | 2020  7:17  |                |             |
| Directive | AM              |                |             |
+-----------+-----------------+----------------+-------------+
 
 
 
+-------------+------------+-------------+----------+
| Code Status | Date       | Date        | Comments |
|             | Activated  | Inactivated |          |
+-------------+------------+-------------+----------+
| Full Code   | 2020   | 2/10/2020   |          |
|             | 6:08 PM    | 6:13 PM     |          |
+-------------+------------+-------------+----------+

## 2020-07-06 NOTE — EKG
[facility]
                                    [address]
                                  [place]  [address]
_________________________________________________________________________________________
                                                                 Signed   
 
 
Ventricular-paced rhythm
Abnormal ECG
No previous ECGs available
Confirmed by MIGUE KEENE MD ([id number]) on 7/6/2020 10:34:10 PM
 
 
 
 
 
 
 
 
 
 
 
 
 
 
 
 
 
 
 
 
 
 
 
 
 
 
 
 
 
 
 
 
 
 
 
 
 
 
 
 
 
    Electronically Signed By: MIGUE KEENE MD  07/06/20 2234
_________________________________________________________________________________________
PATIENT NAME:     ZEUS GRANADO              
MEDICAL RECORD #: [id number]                     Electrocardiogram             
          ACCT #: [id number]  
DATE OF BIRTH:   [birth year]                                       
PHYSICIAN:   MIGUE KEENE MD                   REPORT #: [id number]
REPORT IS CONFIDENTIAL AND NOT TO BE RELEASED WITHOUT AUTHORIZATION

## 2020-07-06 NOTE — NUR
Called by [facility] 1313 for PT demise in ED. Contacted PT's next of
kin, his daughter. Called [facility] at her request at [phone]. PT released to [facility]
at [phone].

## 2020-07-06 NOTE — XMS
Encounter Summary
  Created on: 2020
 
 Zeus Huang
 External Reference #: [id number]
 : [birth year]
 Sex: Male
 
 Demographics
 
 
+-----------------------+----------------------+
| Address               | [address]    |
|                       | [place]  [address] |
+-----------------------+----------------------+
| Home Phone            | [phone]      |
+-----------------------+----------------------+
| Preferred Language    | Unknown              |
+-----------------------+----------------------+
| Marital Status        |              |
+-----------------------+----------------------+
| Yazidi Affiliation | [id number]                 |
+-----------------------+----------------------+
| Race                  | Unknown              |
+-----------------------+----------------------+
| Ethnic Group          | Unknown              |
+-----------------------+----------------------+
 
 
 Author
 
 
+--------------+--------------------------------------------+
| Author       | [facility]  |
|              | [facility]                                |
+--------------+--------------------------------------------+
| Organization | [facility]  |
|              | and [facility]                                |
+--------------+--------------------------------------------+
| Address      | Unknown                                    |
+--------------+--------------------------------------------+
| Phone        | Unavailable                                |
+--------------+--------------------------------------------+
 
 
 
 Support
 
 
+--------------------+--------------+---------+-----------------+
| Name               | Relationship | Address | Phone           |
+--------------------+--------------+---------+-----------------+
| Amy Huang | ECON         | Unknown | [phone] |
+--------------------+--------------+---------+-----------------+
 
 
 
 Care Team Providers
 
 
 
+-----------------------+------+-----------------+
| Care Team Member Name | Role | Phone           |
+-----------------------+------+-----------------+
| Omega Yu MD     | PCP  | [phone] |
+-----------------------+------+-----------------+
 
 
 
 Reason for Visit
 Auth/Cert
 
+--------+--------+-----------+--------------+--------------+--------------+
| Status | Reason | Specialty | Diagnoses /  | Referred By  | Referred To  |
|        |        |           | Procedures   | Contact      | Contact      |
+--------+--------+-----------+--------------+--------------+--------------+
|        |        |           |   Diagnoses  |              |              |
|        |        |           |  ACS (acute  |              |              |
|        |        |           | coronary     |              |              |
|        |        |           | syndrome)    |              |              |
|        |        |           | (HCC)  ACS   |              |              |
+--------+--------+-----------+--------------+--------------+--------------+
 
 
 
 
 Encounter Details
 
 
+--------+-----------+----------------------+--------------------+----------------------+
| Date   | Type      | Department           | Care Team          | Description          |
+--------+-----------+----------------------+--------------------+----------------------+
| / | Hospital  |   [facility] |   Trace Reyes    | ACS (acute coronary  |
|  - | Encounter |  [facility]     | MD Valentina  [address]  | syndrome) (HCC);     |
|        |           | [address]  | [address]  [facility]   | Cirrhosis of liver   |
| 02/10/ |           | [place] [address] | [place] [address]    | with ascites,        |
|    |           |   [phone]       | [phone]       | unspecified hepatic  |
|        |           |                      | [phone] (Fax) | cirrhosis type       |
|        |           |                      |                    | (HCC); Diabetes      |
|        |           |                      |                    | mellitus type 2,     |
|        |           |                      |                    | insulin dependent    |
|        |           |                      |                    | (HCC); Elevated      |
|        |           |                      |                    | troponin; Essential  |
|        |           |                      |                    | hypertension;        |
|        |           |                      |                    | Physical             |
|        |           |                      |                    | deconditioning;      |
|        |           |                      |                    | Pancytopenia (HCC);  |
|        |           |                      |                    | Insulin dependent    |
|        |           |                      |                    | diabetes mellitus    |
|        |           |                      |                    | (HCC);               |
|        |           |                      |                    | Hyperlipidemia,      |
|        |           |                      |                    | unspecified          |
|        |           |                      |                    | hyperlipidemia type; |
|        |           |                      |                    |  Diarrhea,           |
|        |           |                      |                    | unspecified type     |
+--------+-----------+----------------------+--------------------+----------------------+
 
 
 
 
 Social History
 
 
+---------------+------------+-----------+--------+------------------+
| Tobacco Use   | Types      | Packs/Day | Years  | Date             |
|               |            |           | Used   |                  |
+---------------+------------+-----------+--------+------------------+
| Former Smoker | Cigarettes |           |        | Quit: 1995 |
+---------------+------------+-----------+--------+------------------+
 
 
 
+---------------------+---+---+---+
| Smokeless Tobacco:  |   |   |   |
| Never Used          |   |   |   |
+---------------------+---+---+---+
 
 
 
+---------------+-------------+---------+-------------------+
| Alcohol Use   | Drinks/Week | oz/Week | Comments          |
+---------------+-------------+---------+-------------------+
| Not Currently |             |         | quit 6 months ago |
+---------------+-------------+---------+-------------------+
 
 
 
+------------------+---------------+
| Sex Assigned at  | Date Recorded |
| Birth            |               |
+------------------+---------------+
| Not on file      |               |
+------------------+---------------+
 documented as of this encounter
 
 Last Filed Vital Signs
 
 
+-------------------+---------------------+----------------------+----------+
| Vital Sign        | Reading             | Time Taken           | Comments |
+-------------------+---------------------+----------------------+----------+
| Blood Pressure    | 154/71              | 02/10/2020  8:00 AM  |          |
|                   |                     | PST                  |          |
+-------------------+---------------------+----------------------+----------+
| Pulse             | 82                  | 02/10/2020  8:00 AM  |          |
|                   |                     | PST                  |          |
+-------------------+---------------------+----------------------+----------+
| Temperature       | 36.4   C (97.5   F) | 02/10/2020  8:00 AM  |          |
|                   |                     | PST                  |          |
+-------------------+---------------------+----------------------+----------+
| Respiratory Rate  | 18                  | 02/10/2020  8:00 AM  |          |
|                   |                     | PST                  |          |
+-------------------+---------------------+----------------------+----------+
| Oxygen Saturation | 97%                 | 02/10/2020  8:00 AM  |          |
|                   |                     | PST                  |          |
+-------------------+---------------------+----------------------+----------+
| Inhaled Oxygen    | -                   | -                    |          |
| Concentration     |                     |                      |          |
+-------------------+---------------------+----------------------+----------+
| Weight            | 76 kg (167 lb 8.8   | 02/10/2020  6:27 AM  |          |
 
|                   | oz)                 | PST                  |          |
+-------------------+---------------------+----------------------+----------+
| Height            | 165.1 cm (5' 5")    | 2020  5:16 PM  |          |
|                   |                     | PST                  |          |
+-------------------+---------------------+----------------------+----------+
| Body Mass Index   | 27.88               | 2020  5:16 PM  |          |
|                   |                     | PST                  |          |
+-------------------+---------------------+----------------------+----------+
 documented in this encounter
 
 Discharge Summaries
 Janet Sosa MD - 02/10/2020  1:56 PM PSTFormatting of this note might be differen
t from the original.
 [place], [place]
 HOSPITALIST DISCHARGE SUMMARY
 
 Pt. Name/Age/:   Zeus Huang    69 y.o.   1950       
 Medical Record Number:     [id number]
 Date of Admission:    2020       
 Date of Discharge:    2/10/2020
 
 Admitting Physician:   Trace Reyes MD      
 Primary Care Provider:   Omega Yu MD
 Discharging Physician:  Janet Sosa MD
      
 
 DISCHARGE DIAGNOSES: 
 
 Active Hospital Problems 
  Diagnosis 
   Physical deconditioning 
   Elevated troponin 
   Cirrhosis of liver with ascites  
   Diabetes mellitus type 2, insulin dependent  
   Essential hypertension 
  
 Resolved Hospital Problems 
 No resolved problems to display. 
 
 DISCHARGE MEDICATIONS: 
  
  
 Discharge Medications 
  
 New Medications  
   Details 
 dicyclomine 10 mg capsule
  Take 2 capsules by mouth 4 times daily as needed for GI Upset.
 aka:  BENTYL
  
 loperamide 2 mg capsule
  Take 1 capsule by mouth every 3 hours as needed for Diarrhea.
 aka:  IMODIUM
  
 spironolactone 100 MG tablet
  Take 1 tablet by mouth Daily.
 aka:  ALDACTONE
  
  
 
 Changed Medications  
   Details 
 furosemide 40 mg tablet
  Take 1 tablet by mouth Daily.
 What changed:  
  medication strength
  how much to take
 aka:  LASIX
  
 insulin aspart 100 units/mL injection
  Inject 5 Units under the skin 3 times daily (before meals).
 What changed:  how much to take
 aka:  novoLOG
  
  
 Unchanged Medications  
   Details 
 aspirin 81 mg EC tablet
  Take 81 mg by mouth Daily.
  
 atorvaSTATin 40 mg tablet
  Take 40 mg by mouth nightly.
 aka:  LIPITOR
  
 DULoxetine 30 mg DR capsule
  Take 30 mg by mouth Daily.
 aka:  CYMBALTA
  
 folic acid 1 mg tablet
  Take 1 mg by mouth Daily.
  
 hydrOXYzine hydrochloride 25 mg tablet
  Take 25 mg by mouth every 8 hours as needed for Anxiety.
 aka:  ATARAX
  
 insulin glargine 100 units/mL injection (vial)
  Inject 15 Units under the skin nightly.
 aka:  LANTUS
  
 lisinopril 5 mg tablet
  Take 5 mg by mouth Daily.
 aka:  PRINIVIL, ZESTRIL
  
 magnesium oxide 400 mg tablet
  Take 400 mg by mouth Daily.
 aka:  MAG-OX
  
 nicotine 14 mg/24 hr
  Place 1 patch onto the skin every 24 hours.
 aka:  NICODERM
  
 pantoprazole 40 mg tablet
  Take 40 mg by mouth every morning (before breakfast).
 aka:  PROTONIX
  
 sucralfate 1 g tablet
  Take 1 g by mouth 4 times daily.
 aka:  CARAFATE
  
 tamsulosin 0.4 mg Caps
 
  Take 0.8 mg by mouth daily (after breakfast).
 aka:  FLOMAX
  
 traMADol 50 mg tablet
  Take 1 tablet by mouth every 6 hours as needed for Pain.
 aka:  ULTRAM
  
 traZODone 100 mg tablet
  Take 100 mg by mouth nightly.
 aka:  DESYREL
  
  
 
 LIMITED ULTRASOUND ABDOMEN 2020 7:08 AM
 
 CLINICAL HISTORY: ascites, history of cirrhosis
 
 COMPARISON: None available
 
 FINDINGS: The pancreas is somewhat echogenic but otherwise unremarkable.  The
 liver measures 15.5 cm in craniocaudal dimension.  The liver demonstrates
 lobular contour and apparent caudate lobe enlargement, consistent with
 cirrhosis.  No hepatic mass is identified.  The gallbladder contains echogenic,
 shadowing material potentially reflecting a combination of gallstones and
 sludge, without associated wall thickening.  A 4 mm rounded, echogenic,
 nondependent structure along the margin of the gallbladder lumen may reflect a
 polyp.  Sonographic Rodriguez sign is reportedly negative.  The common duct
 measures up to 5 mm in diameter.  The right kidney measures 11 cm long axis and
 is unremarkable, without hydronephrosis.  The spleen is enlarged up to a long
 axis of 16.1 cm.  Moderate anechoic ascites is visible.  A right pleural
 effusion is also noted.  The portal and hepatic veins are patent and demonstrate
 appropriately directed flow.
 
 IMPRESSION: 
 
 1.  STIGMATA OF CIRRHOSIS WITH SPLENOMEGALY AND ASCITES.
 
 2.  CHOLELITHIASIS.
 
 3.  RIGHT PLEURAL EFFUSION.
 
 Dictated and Signed by: Emerson Adams MD 
  Electronically signed: 2020 8:56 AM
 
 HOSPITAL COURSE: 
 
 Please refer to the H&P for full details and the most recent rounding rounding (progress) n
ote.
 
 Admission HPI: HPI: 69 y.o.malewith a history ofHep C Cirrhosis with ascites, histo
ry of varices, HTN, AS s/p Aortic valve replacement, PM placement for bradycardia, brought t
o [facility] by EMS with chief complaint of weakness. Patient has had progressive weaknes
s for over past few weeks to months. Has shortness of breath with exertion and fatigue. On s
creening the patient was noted to have troponin of 0.104. Patient denies chest pain or histo
ry of heart attack, does not note having a history of cardiac workup. However patient does h
ave history of aortic stenosis s/p replacement and pacemaker placement for bradycardia. No k
nown [facility] as per patient. Has remote smoking history, has history of HTN, DMII, and HLD. Take
s aspirin and statin at home. No known history of CHF. Given patient's troponin level despit
e no current cardiac symptoms, patient was transferred to [facility] for further cardiac wor
kup, as this service is unavailable at [facility]. 
 
 
 Patient has been living independently at home but has had worsening weakness over the past 
month. Has had increased abdominal distention and worsening jaundice. EMS reported that he h
ad increased work of breathing at his home but never required O2 in the ER. Blood pressure s
table in Er, Heart rate  prior to transfer to [facility] Was given 1L NS bolus at [facility] prior to transfer.
 
  #Elevated Troponin, ACS rule out (POA)
 Patient was without chest pain, EKG showed <1mm st elevations V2-V3 unchanged on serial EKG
s, ventricular paced rhythm with history of pacemaker for bradycardia. No history of CAD and
 no history of known cardiac workup in past, has history of HTN, HLD, and DMII as risk facto
rs. Troponin 0.104 in [facility], repeat on arrival was 0.03 downtrending. Remains chest 
pain free
 - Troponin negative X 3 here
 - Nuclear stress test done : negative
 - 2D Echo with EF of 55-60%, no WMA and  normal functioning bioprosthetic aortic valve
 
 # Deconditioning
 -h/o Polio, PT/OT recommend SNF, discharge to  [facility]
 
 # Cirrhosis with ascites and history of varices
 # Pancytopenia
 # History of Hep C, untreated
 Patient has Hep C has not undergone treatment for this. Has cirrhosis history and history o
f varices. Has stated that he has never had a paracentesis in the past for his ascites.
 - Abdominal US showed moderate ascites s/p paracentesis  with 5L out, fluid studies nega
tive for SBP, SAAG>1.1 also has LE swelling and scrotal swelling, on lasix 10 mg PTA, starte
d lasix 40 mg and added aldactone 100 mg 
 - no e/o hepatic encephalopathy and GI bleed
 - patient to follow with PCP, recommend referral to GI for EGD for variceal surveillance  
 
 # Diarrhea
 - patient reports that he has chronic diarrhea
 - stool PCR negative, C.dff negative
 - improved with loperamide prn
 - will need C-scope as OP 
 
 #IDDM
 - cont lantus nightly and Humalog AC
 
 #HTN
 - continue home lisinopril
 
 #HLD
 Continue statin
 
 #AS s/p AVR
 #Bradycardia s/p PM
 - noted 
 
 Most recent weight: Input and output for last 24hrs: 
 Wt Readings from Last 1 Encounters: 
 02/10/20 76 kg (167 lb 8.8 oz) 
  I/O last 24 Hours:
 In: 690 [P.O.:690]
 Out: 1695 [Urine:1695] 
 
 Vitals Ranges:
 Temp:  [36.2 C (97.2 F)-36.5 C (97.7 F)] 36.4 C (97.5 F)
 Pulse:  [72-82] 82
 
 Resp:  [18-20] 18
 BP: (123-154)/(64-79) 154/71
 
 Vitals:
 Temp: 36.4 C (97.5 F)         BP: 154/71        Pulse: 82       Resp: 18     SpO2: 97 %
 
 SpO2 97 % on room air at flow rate  L/min
 
 PHYSICAL EXAM: 
 
 Patient seen and examined by me on discharge day 
 
 PROCEDURES AND CONSULTS: 
 
 Procedure Paracentesis 
 
 Consults none
 
 PENDING RESULTS: 
 
 none
 
 DISPOSITION AND DISCHARGE INSTRUCTIONS: 
 
  Contact information for follow-up providers  
  Omega Yu MD. Schedule an appointment as soon as possible for a visit in 1 week.  
 Specialty:  Family Medicine
 Contact information:
 [address] [facility] [address]
 [phone]
 
  
  
  
  
  Contact information for after-discharge care  
  Placement Destination  
  [facility].  
 Service:  Skilled Nursing Facility
 Contact information:
 [address] [facility] [address] [phone]
 
  
  
  
  
  
  
  
 
 Condition: Patient being discharged with condition improved
 
 Diet: 2 gm sodium, fat and cholesterol modified, diabetic 
 
 Greater than 30 minutes were spent on discharge and coordination of post-hospital care.
 
 Electronically signed by: Janet Sosa MD, 2/10/2020 1:57 PM 
 
 [facility]
 
 Portions of this chart may have been created with Dragon voice recognition software. Occasi
onal wrong-word or   sound-alike  substitutions may have occurred due to the inherent garland
itations of voice recognition software. Please read the chart carefully and recognize, using
 context, where these substitutions have occurredElectronically signed by Janet rdz MD at 02/10/2020  2:06 PM PSTdocumented in this encounter
 
 Discharge Instructions
 AttachmentsThe following attachments cannot be sent through Care Everywhere.Cirrhosis of th
e Liver, Discharge Instructions for (English)documented in this encounter
 
 Medications at Time of Discharge
 
 
+----------------------+----------------------+-----------+---------+----------+----------+
| Medication           | Sig                  | Dispensed | Refills | Start    | End Date |
|                      |                      |           |         | Date     |          |
+----------------------+----------------------+-----------+---------+----------+----------+
|   aspirin 81 mg EC   | Take 81 mg by mouth  |           | 0       |          |          |
| tablet               | Daily.               |           |         |          |          |
+----------------------+----------------------+-----------+---------+----------+----------+
|   atorvaSTATin       | Take 40 mg by mouth  |           | 0       |          |          |
| (LIPITOR) 40 mg      | nightly.             |           |         |          |          |
| tablet               |                      |           |         |          |          |
+----------------------+----------------------+-----------+---------+----------+----------+
|   dicyclomine        | Take 2 capsules by   |   240     | 0       | 02/10/20 |          |
| (BENTYL) 10 mg       | mouth 4 times daily  | capsule   |         | 20       |          |
| capsule              | as needed for GI     |           |         |          |          |
|                      | Upset.               |           |         |          |          |
+----------------------+----------------------+-----------+---------+----------+----------+
|   DULoxetine         | Take 30 mg by mouth  |           | 0       |          |          |
| (CYMBALTA) 30 mg DR  | Daily.               |           |         |          |          |
| capsule              |                      |           |         |          |          |
+----------------------+----------------------+-----------+---------+----------+----------+
|   folic acid 1 mg    | Take 1 mg by mouth   |           | 0       |          |          |
| tablet               | Daily.               |           |         |          |          |
+----------------------+----------------------+-----------+---------+----------+----------+
|   furosemide (LASIX) | Take 1 tablet by     |           | 0       | 02/10/20 |          |
|  40 mg tablet        | mouth Daily.         |           |         | 20       |          |
+----------------------+----------------------+-----------+---------+----------+----------+
|   hydrOXYzine        | Take 25 mg by mouth  |           | 0       |          |          |
| pamoate (VISTARIL)   | 3 times daily as     |           |         |          |          |
| 25 mg capsule        | needed for Anxiety.  |           |         |          |          |
+----------------------+----------------------+-----------+---------+----------+----------+
|   insulin aspart     | Inject 5 Units under |           | 0       | 02/10/20 |          |
| (NOVOLOG) 100        |  the skin 3 times    |           |         | 20       |          |
| units/mL injection   | daily (before        |           |         |          |          |
|                      | meals).              |           |         |          |          |
+----------------------+----------------------+-----------+---------+----------+----------+
|   insulin glargine   | Inject 15 Units      |           | 0       |          |          |
| (LANTUS) 100         | under the skin       |           |         |          |          |
| units/mL injection   | nightly.             |           |         |          |          |
| (vial)               |                      |           |         |          |          |
+----------------------+----------------------+-----------+---------+----------+----------+
|   lisinopril         | Take 5 mg by mouth   |           | 0       |          |          |
| (PRINIVIL, ZESTRIL)  | Daily.               |           |         |          |          |
| 5 mg tablet          |                      |           |         |          |          |
+----------------------+----------------------+-----------+---------+----------+----------+
|   loperamide         | Take 1 capsule by    |           | 0       | /10/ |          |
 
| (IMODIUM) 2 mg       | mouth every 3 hours  |           |         | 20       |          |
| capsule              | as needed for        |           |         |          |          |
|                      | Diarrhea.            |           |         |          |          |
+----------------------+----------------------+-----------+---------+----------+----------+
|   magnesium oxide    | Take 400 mg by mouth |           | 0       |          |          |
| (MAG-OX) 400 mg      |  2 times daily.      |           |         |          |          |
| tablet               |                      |           |         |          |          |
+----------------------+----------------------+-----------+---------+----------+----------+
|   nicotine           | Place 1 patch onto   |           | 0       |          |          |
| (NICODERM) 14 mg/24  | the skin every 24    |           |         |          |          |
| hr                   | hours.               |           |         |          |          |
+----------------------+----------------------+-----------+---------+----------+----------+
|   omeprazole         | Take 20 mg by mouth  |           | 0       |          |          |
| (PRILOSEC) 20 mg     | every morning        |           |         |          |          |
| capsule              | (before breakfast).  |           |         |          |          |
+----------------------+----------------------+-----------+---------+----------+----------+
|   pantoprazole       | Take 40 mg by mouth  |           | 0       |          |          |
| (PROTONIX) 40 mg     | 2 times daily        |           |         |          |          |
| tablet               | (before meals).      |           |         |          |          |
+----------------------+----------------------+-----------+---------+----------+----------+
|   spironolactone     | Take 1 tablet by     |           | 0       | 02/10/20 |          |
| (ALDACTONE) 100 MG   | mouth Daily.         |           |         | 20       |          |
| tablet               |                      |           |         |          |          |
+----------------------+----------------------+-----------+---------+----------+----------+
|   sucralfate         | Take 1 g by mouth 4  |           | 0       |          |          |
| (CARAFATE) 1 g       | times daily (before  |           |         |          |          |
| tablet               | meals and nightly).  |           |         |          |          |
+----------------------+----------------------+-----------+---------+----------+----------+
|   tamsulosin         | Take 0.8 mg by mouth |           | 0       |          |          |
| (FLOMAX) 0.4 mg CAPS |  daily (after        |           |         |          |          |
|                      | breakfast).          |           |         |          |          |
+----------------------+----------------------+-----------+---------+----------+----------+
|   traMADol (ULTRAM)  | Take 1 tablet by     |   20      | 0       | 02/10/20 |          |
| 50 mg tablet         | mouth every 6 hours  | tablet    |         | 20       |          |
|                      | as needed for Pain.  |           |         |          |          |
+----------------------+----------------------+-----------+---------+----------+----------+
|   traZODone          | Take 100 mg by mouth |           | 0       |          |          |
| (DESYREL) 100 mg     |  nightly.            |           |         |          |          |
| tablet               |                      |           |         |          |          |
+----------------------+----------------------+-----------+---------+----------+----------+
 documented as of this encounter
 
 Progress Notes
 Janet Sosa MD - 02/10/2020 10:15 AM PSTFormatting of this note might be differen
t from the original.
 [place], [place]
 HOSPITALIST PROGRESS NOTE
 
 Patient: Zeus Huang  
 :   1950:         
 Age:    69 y.o.
 MedRec: [id number]
 
 Admission date:    2020
 Hospital day #     : 5
 Physician author:  Janet Sosa MD
 
 Today:                    2/10/2020
  
 
 
 Assessment and Hospital Course 
 
 Active Hospital Problems 
  Diagnosis 
   Elevated troponin 
   ACS (acute coronary syndrome)  
   Cirrhosis of liver with ascites  
   Diabetes mellitus type 2, insulin dependent  
   Essential hypertension 
  
 Resolved Hospital Problems 
 No resolved problems to display. 
  
 Admission HPI:  69 y.o. male with a history of Hep C Cirrhosis with ascites, history of kelly
ices, HTN, AS s/p Aortic valve replacement, PM placement for bradycardia, brought to [facility] by EMS with chief complaint of weakness. Patient has had progressive weakness for ove
r past few weeks to months. Has shortness of breath with exertion and fatigue. On screening 
the patient was noted to have troponin of 0.104. Patient denies chest pain or history of hea
rt attack, does not note having a history of cardiac workup. However patient does have histo
ry of aortic stenosis s/p replacement and pacemaker placement for bradycardia. No known LHC 
as per patient. Has remote smoking history, has history of HTN, DMII, and HLD. Takes aspirin
 and statin at home. No known history of CHF. Given patient's troponin level despite no curr
ent cardiac symptoms, patient was transferred to [facility] for further cardiac workup, as t
his service is unavailable at [facility]. 
 
 Patient has been living independently at home but has had worsening weakness over the past 
month. Has had increased abdominal distention and worsening jaundice. EMS reported that he h
ad increased work of breathing at his home but never required O2 in the ER. Blood pressure s
table in Er, Heart rate  prior to transfer to [facility] (formerly [facility] Was given 1L NS bolus at [facility] prior to transfer.
 
 EKG in [facility] 1250pm 20
 Ventricular paced rhythm, rate 109. <1mm ST elevations V2 and V3. No t wave inversions, no 
st depressions.
 
 
 Plan 
 
 # Deconditioning
 - PT/OT recommend SNF, CM working on it, awaiting placement to [facility]
 
 # Cirrhosis with ascites and history of varices
 # Pancytopenia
 # History of Hep C, untreated
 Patient has Hep C has not undergone treatment for this. Has cirrhosis history and history o
f varices. Has stated that he has never had a paracentesis in the past for his ascites.
 - Abdominal US showed moderate ascites s/p paracentesis  with 5L out, fluid studies nega
tive for SBP, SAAG>1.1 also has LE swelling and scrotal swelling, continue lasix to 40 mg an
d aldactone 100 mg 
 - no e/o hepatic encephalopathy and GI bleed
 
 # Diarrhea
 - patient reports that he has chronic diarrhea
 - stool PCR negative, C.dff negative
 - improved with loperamide and lomotil prn 
 - will need C-scope as OP 
 
  #Elevated Troponin, ACS rule out (POA)
 Patient was without chest pain, EKG showed <1mm st elevations V2-V3 unchanged on serial EKG
 
s, ventricular paced rhythm with history of pacemaker for bradycardia. No history of CAD and
 no history of known cardiac workup in past, has history of HTN, HLD, and DMII as risk facto
rs. Troponin 0.104 in [facility], repeat on arrival was 0.03 downtrending. Remains chest 
pain free
 - Troponin negative X 3 here
 - Nuclear stress test done : negative
 - 2D Echo with EF of 55-60%, no WMA and  normal functioning bioprosthetic aortic valve
 
 #IDDM
 Continue home lantus 15U HS
 ISS #1
 Glucose achs
 
 #HTN
 - continue home lisinopril
 
 #HLD
 Continue statin
 
 #AS s/p AVR
 #Bradycardia s/p PM
 - noted 
 
 
 Subjective 
 
 CC f/u generalized weakness and troponin elevation
 
 NAEO, diarrhea improved, no complains, awaiting placement 
  
 ROS
 See above
 
 Exam 
 
 Unchanged 
 
 GA: NAD, AAOX3
 Neck: no JVD, supple
 Cardiac: rrr, no m/g/r
 Lung: CTAB, normal respiratory effort
 Abdomen: soft, non tender, non distended, nabs
 Ext: 2+ LE swelling 
 
 Allergies:
 No Known Allergies
 
 Current Medications:
 Current Facility-Administered Medications 
 Medication Dose Route Frequency Provider Last Rate Last Dose 
   acetaminophen (TYLENOL) tablet 650 mg  650 mg Oral Q4H PRN Trace Reyes MD   65
0 mg at 20 7486 
   aluminum & magnesium hydroxide-simethicone (MAALOX PLUS REGULAR STRENGTH) 200-200-20 mg
/5 mL suspension 30 mL  30 mL Oral Q4H PRN Trace Reyes MD     
   aspirin chewable tablet 81 mg  81 mg Oral Daily Trace Reyes MD   81 mg at  08 
   atorvaSTATin (LIPITOR) tablet 80 mg  80 mg Oral Nightly Trace Reyes MD   80 mg
 at 20 2019 
   dextrose 50% injection 12.5-25 g  12.5-25 g Intravenous PRN Trace Reyes MD    
 
 
  And 
   dextrose 10% (D10W) infusion   Intravenous Continuous PRN Trace Reyes MD     
   dicyclomine (BENTYL) capsule 20 mg  20 mg Oral 4x Daily PRN Janet Sosa MD    
 
   diphenoxylate-atropine (LOMOTIL) 2.5-0.025 mg/5 mL liquid 10 mL  10 mL Oral 4x Daily UT
N Janet Sosa MD   10 mL at 20 113 
   docusate sodium (COLACE) capsule 100 mg  100 mg Oral BID PRN Trace Reyes MD   
  
   DULoxetine (CYMBALTA) DR capsule 30 mg  30 mg Oral Daily Trace Reyes MD   30 m
g at 02/10/20 0804 
   folic acid tablet 1 mg  1 mg Oral Daily Trace Reyes MD   1 mg at 02/10/20 0804
 
   furosemide (LASIX) tablet 40 mg  40 mg Oral Daily Janet Sosa MD   40 mg at 02
/10/20 08 
   insulin glargine (LANTUS SOLOSTAR) injection (pen) 15 Units  15 Units Subcutaneous Nigh
tly Trace Reyes MD   15 Units at 20 
   insulin lispro (humaLOG KWIKPEN) injection (pen) 0-6 Units  0-6 Units Subcutaneous 4x D
aily WC and HS Trace Reyes MD   1 Units at 02/10/20 0805 
   lisinopril (PRINIVIL, ZESTRIL) tablet 5 mg  5 mg Oral Daily Janet Sosa MD   5
 mg at 02/10/20 0804 
   loperamide (IMODIUM) capsule 2 mg  2 mg Oral Q3H PRN Janet Sosa MD   2 mg at 
20 0634 
   ondansetron (ZOFRAN) injection 4 mg  4 mg Intravenous Q6H PRN Trace Reyes MD  
 4 mg at 20 0832 
   pantoprazole (PROTONIX) DR tablet 40 mg  40 mg Oral QAM AC Trace Reyes MD   40
 mg at 02/10/20 0603 
   spironolactone (ALDACTONE) tablet 100 mg  100 mg Oral Daily Janet Sosa MD   1
00 mg at 02/10/20 0804 
   sucralfate (CARAFATE) tablet 1 g  1 g Oral 4x Daily Trace Reyes MD   1 g at 02
/10/20 0804 
   tamsulosin (FLOMAX) capsule 0.8 mg  0.8 mg Oral Daily after breakfast Trace castellon MD   0.8 mg at 02/10/20 0803 
   traMADol (ULTRAM) tablet 50 mg  50 mg Oral Q6H PRN Trace Reyes MD   50 mg at 0
2/10/20 0425 
   traZODone (DESYREL) tablet 100 mg  100 mg Oral Nightly Trace Reyes MD   100 mg
 at 20 2019 
 
 Current Infusions:
   dextrose 10%   
 
 Objective Data 
 
 Point of care glucose
 Recent Labs 
 Lab 02/10/20
 0606 20
 2026 20
 1703 20
 1210 20
 0648 20
 1644 
 POCGLU 158* 184* 153* 114* 118* 121* 
 
 Labs last 24 hours
 Recent Results (from the past 24 hour(s)) 
 POC Glucose 
  Collection Time: 20 12:10 PM 
 Result Value Ref Range 
  Glucose,  (H) 70 - 109 mg/dL 
 POC Glucose 
 
  Collection Time: 20  5:03 PM 
 Result Value Ref Range 
  Glucose,  (H) 70 - 109 mg/dL 
 POC Glucose 
  Collection Time: 20  8:26 PM 
 Result Value Ref Range 
  Glucose,  (H) 70 - 109 mg/dL 
 CBC with Differential 
  Collection Time: 02/10/20  4:53 AM 
 Result Value Ref Range 
  WBC 1.9 (LL) 4.0 - 11.0 K/uL 
  RBC 3.11 (L) 4.30 - 5.70 M/uL 
  Hemoglobin 9.7 (L) 13.5 - 18.0 g/dL 
  Hematocrit 28.8 (L) 40.0 - 51.0 % 
  MCV 92.6 83.0 - 101.0 fL 
  MCH 31.2 28.0 - 35.0 pg 
  MCHC 33.7 32.0 - 36.0 g/dL 
  RDW-CV 13.2 <15.0 % 
  RDW-SD 44.5 35.1 - 46.3 fL 
  Platelet Count 103 (L) 140 - 440 K/uL 
  MPV 10.3 6.5 - 12.4 fL 
  Immature Platelet Fraction 1.6 0.9 - 11.2 % 
  % Neutrophils 49.8 45.0 - 82.0 % 
  % Lymphocytes 38.2 20.0 - 45.0 % 
  % Monocytes 8.9 4.0 - 12.0 % 
  % Eosinophils 2.1 0.0 - 5.0 % 
  % Basophils 0.5 0.0 - 1.0 % 
  % Immature Granulocytes 0.5 (H) 0.0 - 0.4 % 
  Absolute Neutrophils 0.95 (L) 1.80 - 8.50 K/uL 
  Absolute Lymphocytes 0.73 0.60 - 3.20 K/uL 
  Absolute Monocytes 0.17 0.00 - 1.00 K/uL 
  Absolute Eosinophils 0.04 0.00 - 0.40 K/uL 
  Absolute Basophils 0.01 0.00 - 0.10 K/uL 
  Absolute Immature Granulocytes 0.01 0.00 - 0.03 K/uL 
  % nRBC 0 0 - 2 per 100 WBCs 
  Absolute nRBC 0.00 0.00 - 0.01 K/uL 
 Comprehensive Metabolic Panel 
  Collection Time: 02/10/20  4:53 AM 
 Result Value Ref Range 
  Na 135 (L) 136 - 145 mmol/L 
  K 4.8 3.4 - 5.1 mmol/L 
  Cl 105 98 - 107 mmol/L 
  CO2 27 20 - 31 mmol/L 
  Anion Gap 3 3 - 16 mmol/L 
  Glucose 175 (H) 60 - 106 mg/dL 
  BUN 16 9 - 23 mg/dL 
  Creatinine 0.89 0.70 - 1.30 mg/dL 
  eGFR if not AFRICAN AMERICAN >60 >=60 mL/min/1.73m2 
  Calcium 7.5 (L) 8.7 - 10.4 mg/dL 
  Albumin 2.0 (L) 3.2 - 4.8 g/dL 
  Bilirubin Total 0.4 0.3 - 1.2 mg/dL 
  Total Protein 5.7 5.7 - 8.2 g/dL 
  AST 55 (H) 0 - 34 U/L 
  ALT 30 10 - 49 U/L 
  Alkaline Phosphatase 130 (H) 46 - 116 U/L 
  Globulin 3.7 2.1 - 3.8 g/dL 
  Albumin/Globulin Ratio 0.5 (L) 0.8 - 1.9 
  BUN/Creatinine Ratio 18.0  
 Magnesium 
  Collection Time: 02/10/20  4:53 AM 
 
 Result Value Ref Range 
  Magnesium 1.6 1.6 - 2.6 mg/dL 
 POC Glucose 
  Collection Time: 02/10/20  6:06 AM 
 Result Value Ref Range 
  Glucose,  (H) 70 - 109 mg/dL 
 
 Micro results 
 Microbiology Results (72 hrs)  
  Procedure Component Value Units Date/Time 
  Clostridioides difficle NAAT reflex to Tox Ag [[id number]] Collected:  20 
  Order Status:  Completed Lab Status:  Final result Updated:  20 
  Specimen:  Stool  
  Narrative:    
  The following orders were created for panel order Clostridioides difficle NAAT reflex to T
ox Ag.
 Procedure                               Abnormality         Status                   
 ---------                               -----------         ------                   
 Clostridioides difficile...[[id number]]                      Final result             
 
 Please view results for these tests on the individual orders. 
  Clostridioides difficile NAAT Reflex [[id number]] Collected:  20 
  Order Status:  Completed Lab Status:  Final result Updated:  20 
  Specimen:  Stool  
   C. difficile, Interp Negative 
   Comment: No Toxigenic C. difficile detected. Consider other causes of Diarrhea. Repeat te
sting should not be performed within 7 days. 
  
   C. difficile, NAAT Negative 
  
 
 Radiology results 
 No results found.
 
 Vitals Ranges:
 Temp:  [36.2 C (97.2 F)-36.5 C (97.7 F)] 36.4 C (97.5 F)
 Pulse:  [72-82] 82
 Resp:  [18-20] 18
 BP: (123-154)/(64-79) 154/71
  
 Vitals:
 Temp: 36.4 C (97.5 F)         BP: 154/71        Pulse: 82       Resp: 18     SpO2: 97 %
 
 SpO2 97 % on room air at flow rate  L/min
  
 
 Janet Sosa MD  2/10/2020  10:16 AM
 [facility]
 
 Portions of this chart may have been created with Dragon voice recognition software. Occasi
onal wrong-word or   sound-alike  substitutions may have occurred due to the inherent garland
itations of voice recognition software. Please read the chart carefully and recognize, using
 context, where these substitutions have occurred
 
 Electronically signed by Janet Sosa MD at 02/10/2020 12:26 PM Indu, Janet Madsen MD - 2020  1:25 PM PSTFormatting of this note might be different from the origin
al.
 [place], [place]
 HOSPITALIST PROGRESS NOTE
 
 
 Patient: Zeus Huang  
 :   1950:         
 Age:    69 y.o.
 MedRec: [id number]
 
 Admission date:    2020
 Hospital day #     : 4
 Physician author:  Janet Sosa MD
 
 Today:                    2020
  
 
 Assessment and Hospital Course 
 
 Active Hospital Problems 
  Diagnosis 
   Elevated troponin 
   ACS (acute coronary syndrome)  
   Cirrhosis of liver with ascites  
   Diabetes mellitus type 2, insulin dependent  
   Essential hypertension 
  
 Resolved Hospital Problems 
 No resolved problems to display. 
  
 Admission HPI:  69 y.o. male with a history of Hep C Cirrhosis with ascites, history of kelly
ices, HTN, AS s/p Aortic valve replacement, PM placement for bradycardia, brought to [facility] by EMS with chief complaint of weakness. Patient has had progressive weakness for ove
r past few weeks to months. Has shortness of breath with exertion and fatigue. On screening 
the patient was noted to have troponin of 0.104. Patient denies chest pain or history of hea
rt attack, does not note having a history of cardiac workup. However patient does have histo
ry of aortic stenosis s/p replacement and pacemaker placement for bradycardia. No known LHC 
as per patient. Has remote smoking history, has history of HTN, DMII, and HLD. Takes aspirin
 and statin at home. No known history of CHF. Given patient's troponin level despite no curr
ent cardiac symptoms, patient was transferred to [facility] for further cardiac workup, as t
his service is unavailable at [facility]. 
 
 Patient has been living independently at home but has had worsening weakness over the past 
month. Has had increased abdominal distention and worsening jaundice. EMS reported that he h
ad increased work of breathing at his home but never required O2 in the ER. Blood pressure s
table in Er, Heart rate  prior to transfer to [facility] ([facility] Was given 1L NS bolus at [facility] prior to transfer.
 
 EKG in [facility] 1250pm 20
 Ventricular paced rhythm, rate 109. <1mm ST elevations V2 and V3. No t wave inversions, no 
st depressions.
 
 
 Plan 
 
 # Deconditioning
 - PT/OT recommend SNF, CM working on it, awaiting placement 
 
 # Cirrhosis with ascites and history of varices
 # Pancytopenia
 # History of Hep C, untreated
 Patient has Hep C has not undergone treatment for this. Has cirrhosis history and history o
f varices. Has stated that he has never had a paracentesis in the past for his ascites.
 - Abdominal US showed moderate ascites s/p paracentesis  with 5L out, fluid studies nega
 
tive for SBP, SAAG>1.1 also has LE swelling and scrotal swelling, continue lasix to 40 mg an
d aldactone 100 mg 
 - no e/o hepatic encephalopathy and GI bleed
 
 # Diarrhea
 - patient reports that he has chronic diarrhea
 - stool PCR negative, C.dff negative
 - improved with loperamide and lomotil prn 
 - will need C-scope as OP 
 
  #Elevated Troponin, ACS rule out (POA)
 Patient was without chest pain, EKG showed <1mm st elevations V2-V3 unchanged on serial EKG
s, ventricular paced rhythm with history of pacemaker for bradycardia. No history of CAD and
 no history of known cardiac workup in past, has history of HTN, HLD, and DMII as risk facto
rs. Troponin 0.104 in [facility], repeat on arrival was 0.03 downtrending. Remains chest 
pain free
 - Troponin negative X 3 here
 - Nuclear stress test done : negative
 - 2D Echo with EF of 55-60%, no WMA and  normal functioning bioprosthetic aortic valve
 
 #IDDM
 Continue home lantus 15U HS
 ISS #1
 Glucose achs
 
 #HTN
 - continue home lisinopril
 
 #HLD
 Continue statin
 
 #AS s/p AVR
 #Bradycardia s/p PM
 -monitor tele
 
 
 Subjective 
 
 CC f/u generalized weakness and troponin elevation
 
 NAEO, diarrhea improved, having intermittent dull abdominal pain, no nausea/vomitiing, no o
ther complains 
  
 ROS
 See above
 
 Exam 
 
 Unchanged 
 
 GA: NAD, AAOX3
 Neck: no JVD, supple
 Cardiac: rrr, no m/g/r
 Lung: CTAB, normal respiratory effort
 Abdomen: soft, non tender, non distended, nabs
 Ext: 2+ LE swelling 
 
 Allergies:
 No Known Allergies
 
 
 Current Medications:
 Current Facility-Administered Medications 
 Medication Dose Route Frequency Provider Last Rate Last Dose 
   acetaminophen (TYLENOL) tablet 650 mg  650 mg Oral Q4H PRN Trace Reyes MD   65
0 mg at 20 2346 
   aluminum & magnesium hydroxide-simethicone (MAALOX PLUS REGULAR STRENGTH) 200-200-20 mg
/5 mL suspension 30 mL  30 mL Oral Q4H PRN Trace Reyes MD     
   aspirin chewable tablet 81 mg  81 mg Oral Daily Trace Reyes MD   81 mg at  
   atorvaSTATin (LIPITOR) tablet 80 mg  80 mg Oral Nightly Trace Reyes MD   80 mg
 at 20 
   dextrose 50% injection 12.5-25 g  12.5-25 g Intravenous PRN Trace Reyes MD    
 
  And 
   dextrose 10% (D10W) infusion   Intravenous Continuous PRN Trace Reyes MD     
   dicyclomine (BENTYL) capsule 20 mg  20 mg Oral 4x Daily PRN Janet Sosa MD    
 
   diphenoxylate-atropine (LOMOTIL) 2.5-0.025 mg/5 mL liquid 10 mL  10 mL Oral 4x Daily UT
N Jnaet Sosa MD   10 mL at 20 1134 
   docusate sodium (COLACE) capsule 100 mg  100 mg Oral BID PRN Trace Reyes MD   
  
   DULoxetine (CYMBALTA) DR capsule 30 mg  30 mg Oral Daily Trace Reyes MD   30 m
g at 20 
   folic acid tablet 1 mg  1 mg Oral Daily Trace Reyes MD   1 mg at 20
 
   furosemide (LASIX) tablet 40 mg  40 mg Oral Daily Janet Sosa MD   40 mg at 957 
   insulin glargine (LANTUS SOLOSTAR) injection (pen) 15 Units  15 Units Subcutaneous Nig
tly Trace Reyes MD   15 Units at 20 
   insulin lispro (humaLOG KWIKPEN) injection (pen) 0-6 Units  0-6 Units Subcutaneous 4x D
aily WC and HS Trace Reyes MD   1 Units at 20 1740 
   lisinopril (PRINIVIL, ZESTRIL) tablet 5 mg  5 mg Oral Daily Janet Sosa MD   5
 mg at 20 1000 
   loperamide (IMODIUM) capsule 2 mg  2 mg Oral Q3H PRN Janet Sosa MD   2 mg at 
20 0634 
   ondansetron (ZOFRAN) injection 4 mg  4 mg Intravenous Q6H PRN Trace Reyes MD  
 4 mg at 20 0832 
   pantoprazole (PROTONIX) DR tablet 40 mg  40 mg Oral QAM AC Trace Reyes MD   40
 mg at 20 0649 
   spironolactone (ALDACTONE) tablet 100 mg  100 mg Oral Daily Janet Sosa MD   1
00 mg at 20 1000 
   sucralfate (CARAFATE) tablet 1 g  1 g Oral 4x Daily Trace Reyes MD   1 g at  0959 
   tamsulosin (FLOMAX) capsule 0.8 mg  0.8 mg Oral Daily after breakfast Trace castellon MD   0.8 mg at 20 0959 
   traMADol (ULTRAM) tablet 50 mg  50 mg Oral Q6H PRN Trace Reyes MD   50 mg at 0
20 0407 
   traZODone (DESYREL) tablet 100 mg  100 mg Oral Nightly Trace Reyes MD   100 mg
 at 20 2051 
 
 Current Infusions:
   dextrose 10%   
 
 Objective Data 
 
 Point of care glucose
 Recent Labs 
 Lab 20
 1210 20
 0648 20
 
 1644 20
 1213 20
 0805 20
 0526 
 POCGLU 114* 118* 121* 116* 88 92 
 
 Labs last 24 hours
 Recent Results (from the past 24 hour(s)) 
 POC Glucose 
  Collection Time: 20  4:44 PM 
 Result Value Ref Range 
  Glucose,  (H) 70 - 109 mg/dL 
 CBC with Differential 
  Collection Time: 20  5:01 AM 
 Result Value Ref Range 
  WBC 1.8 (LL) 4.0 - 11.0 K/uL 
  RBC 3.04 (L) 4.30 - 5.70 M/uL 
  Hemoglobin 9.5 (L) 13.5 - 18.0 g/dL 
  Hematocrit 28.5 (L) 40.0 - 51.0 % 
  MCV 93.8 83.0 - 101.0 fL 
  MCH 31.3 28.0 - 35.0 pg 
  MCHC 33.3 32.0 - 36.0 g/dL 
  RDW-CV 13.3 <15.0 % 
  RDW-SD 45.9 35.1 - 46.3 fL 
  Platelet Count 96 (L) 140 - 440 K/uL 
  MPV 10.5 6.5 - 12.4 fL 
  Immature Platelet Fraction 1.7 0.9 - 11.2 % 
  % Neutrophils 52.5 45.0 - 82.0 % 
  % Lymphocytes 37.3 20.0 - 45.0 % 
  % Monocytes 7.9 4.0 - 12.0 % 
  % Eosinophils 1.7 0.0 - 5.0 % 
  % Basophils 0.6 0.0 - 1.0 % 
  % Immature Granulocytes 0.0 0.0 - 0.4 % 
  Absolute Neutrophils 0.93 (L) 1.80 - 8.50 K/uL 
  Absolute Lymphocytes 0.66 0.60 - 3.20 K/uL 
  Absolute Monocytes 0.14 0.00 - 1.00 K/uL 
  Absolute Eosinophils 0.03 0.00 - 0.40 K/uL 
  Absolute Basophils 0.01 0.00 - 0.10 K/uL 
  Absolute Immature Granulocytes 0.00 0.00 - 0.03 K/uL 
  % nRBC 0 0 - 2 per 100 WBCs 
  Absolute nRBC 0.00 0.00 - 0.01 K/uL 
 Comprehensive Metabolic Panel 
  Collection Time: 20  5:01 AM 
 Result Value Ref Range 
  Na 136 136 - 145 mmol/L 
  K 4.9 3.4 - 5.1 mmol/L 
  Cl 107 98 - 107 mmol/L 
  CO2 25 20 - 31 mmol/L 
  Anion Gap 4 3 - 16 mmol/L 
  Glucose 134 (H) 60 - 106 mg/dL 
  BUN 19 9 - 23 mg/dL 
  Creatinine 1.04 0.70 - 1.30 mg/dL 
  eGFR if not AFRICAN AMERICAN >60 >=60 mL/min/1.73m2 
  Calcium 7.4 (L) 8.7 - 10.4 mg/dL 
  Albumin 1.8 (L) 3.2 - 4.8 g/dL 
  Bilirubin Total 0.4 0.3 - 1.2 mg/dL 
  Total Protein 5.2 (L) 5.7 - 8.2 g/dL 
  AST 50 (H) 0 - 34 U/L 
  ALT 25 10 - 49 U/L 
  Alkaline Phosphatase 113 46 - 116 U/L 
 
  Globulin 3.4 2.1 - 3.8 g/dL 
  Albumin/Globulin Ratio 0.5 (L) 0.8 - 1.9 
  BUN/Creatinine Ratio 18.3  
 Magnesium 
  Collection Time: 20  5:01 AM 
 Result Value Ref Range 
  Magnesium 1.7 1.6 - 2.6 mg/dL 
 POC Glucose 
  Collection Time: 20  6:48 AM 
 Result Value Ref Range 
  Glucose,  (H) 70 - 109 mg/dL 
 POC Glucose 
  Collection Time: 20 12:10 PM 
 Result Value Ref Range 
  Glucose,  (H) 70 - 109 mg/dL 
 
 Micro results 
 Microbiology Results (72 hrs)  
  Procedure Component Value Units Date/Time 
  Clostridioides difficle NAAT reflex to Tox Ag [[id number]] Collected:  20 
  Order Status:  Completed Lab Status:  Final result Updated:  20 
  Specimen:  Stool  
  Narrative:    
  The following orders were created for panel order Clostridioides difficle NAAT reflex to T
ox Ag.
 Procedure                               Abnormality         Status                   
 ---------                               -----------         ------                   
 Clostridioides difficile...[[id number]]                      Final result             
 
 Please view results for these tests on the individual orders. 
  Clostridioides difficile NAAT Reflex [[id number]] Collected:  20 
  Order Status:  Completed Lab Status:  Final result Updated:  20 
  Specimen:  Stool  
   C. difficile, Interp Negative 
   Comment: No Toxigenic C. difficile detected. Consider other causes of Diarrhea. Repeat te
sting should not be performed within 7 days. 
  
   C. difficile, NAAT Negative 
  Stool Pathogens, NAAT [[id number]]  (Normal) Collected:  20 
  Order Status:  Completed Lab Status:  Final result Updated:  20 
  Specimen:  Stool  
   Campylobacter, NAAT Not Detected 
   Salmonella, NAAT Not Detected 
   Shigella NAAT Not Detected 
   Vibrio, NAAT Not Detected 
   Yersinia enterocolitica, NAAT Not Detected 
   Shigatoxin 1 Not Detected 
   Shigatoxin 2 Not Detected 
  Culture, Body Fluid, Sterile, Smear, with Anaerobes [[id number]] Collected:  20 
  Order Status:  Sent Lab Status:  In process Updated:  20 
  Specimen:  Body Fluid from Ascites  
  Narrative:    
  The following orders were created for panel order Culture, Body Fluid, Sterile, Smear, wit
h Anaerobes.
 Procedure                               Abnormality         Status                   
 ---------                               -----------         ------                   
 Culture, Body Fluid, Aerobe[[id number]]                      Preliminary result       
 Culture, Body Fluid, Carmela...[[id number]]                      In process               
 
 Please view results for these tests on the individual orders. 
 
  Culture, Body Fluid, Aerobe [[id number]] Collected:  20 
  Order Status:  Completed Lab Status:  Preliminary result Updated:  20 1130 
  Specimen:  Body Fluid from Ascites  
   Culture No growth to date 
   Gram Stain Result 2+ White Blood Cells 
    No organisms seen 
  Culture, Body Fluid, Anaerobe [[id number]] Collected:  20 
  Order Status:  Resulted Lab Status:  In process Updated:  20 
  Specimen:  Body Fluid from Ascites  
  
 
 Radiology results 
 No results found.
 
 Vitals Ranges:
 Temp:  [35.6 C (96 F)-36.9 C (98.4 F)] 36.3 C (97.3 F)
 Pulse:  [71-80] 72
 Resp:  [19-20] 20
 BP: (108-136)/(57-67) 136/60
  
 Vitals:
 Temp: 36.3 C (97.3 F)         BP: 136/60        Pulse: 72       Resp: 20     SpO2: 97 %
 
 SpO2 97 % on room air at flow rate  L/min
  
 
 Janet Sosa MD  2020  1:25 PM
 [facility]
 
 Portions of this chart may have been created with Dragon voice recognition software. Occasi
onal wrong-word or   sound-alike  substitutions may have occurred due to the inherent garland
itations of voice recognition software. Please read the chart carefully and recognize, using
 context, where these substitutions have occurred
 
 Electronically signed by Janet Sosa MD at 2020  1:26 PM PSTAhmed, Janet Madsen MD - 2020  1:27 PM PSTFormatting of this note might be different from the origin
al.
 [facility]
 DION HAND
 HOSPITALIST PROGRESS NOTE
 
 Patient: Zeus Huang  
 :   1950:         
 Age:    69 y.o.
 MedRec: [id number]
 
 Admission date:    2020
 Hospital day #     : 3
 Physician author:  Janet Sosa MD
 
 Today:                    2020
  
 
 Assessment and Hospital Course 
 
 Active Hospital Problems 
  Diagnosis 
   Elevated troponin 
   ACS (acute coronary syndrome)  
   Cirrhosis of liver with ascites  
 
   Diabetes mellitus type 2, insulin dependent  
   Essential hypertension 
  
 Resolved Hospital Problems 
 No resolved problems to display. 
  
 Admission HPI:  69 y.o. male with a history of Hep C Cirrhosis with ascites, history of kelly
ices, HTN, AS s/p Aortic valve replacement, PM placement for bradycardia, brought to [facility] by EMS with chief complaint of weakness. Patient has had progressive weakness for ove
r past few weeks to months. Has shortness of breath with exertion and fatigue. On screening 
the patient was noted to have troponin of 0.104. Patient denies chest pain or history of hea
rt attack, does not note having a history of cardiac workup. However patient does have histo
ry of aortic stenosis s/p replacement and pacemaker placement for bradycardia. No known LHC 
as per patient. Has remote smoking history, has history of HTN, DMII, and HLD. Takes aspirin
 and statin at home. No known history of CHF. Given patient's troponin level despite no curr
ent cardiac symptoms, patient was transferred to [facility] for further cardiac workup, as t
his service is unavailable at [facility]. 
 
 Patient has been living independently at home but has had worsening weakness over the past 
month. Has had increased abdominal distention and worsening jaundice. EMS reported that he h
ad increased work of breathing at his home but never required O2 in the ER. Blood pressure s
table in Er, Heart rate  prior to transfer to [facility] (formerly [facility] Was given 1L NS bolus at [facility] prior to transfer.
 
 EKG in [facility] 1250pm 20
 Ventricular paced rhythm, rate 109. <1mm ST elevations V2 and V3. No t wave inversions, no 
st depressions.
 
 
 Plan 
 
 # Deconditioning
 - PT/OT recommend SNF, CM working on it
 
 # Cirrhosis with ascites and history of varices
 # Pancytopenia
 # History of Hep C, untreated
 Patient has Hep C has not undergone treatment for this. Has cirrhosis history and history o
f varices. Has stated that he has never had a paracentesis in the past for his ascites.
 - Abdominal US showed moderate ascites s/p paracentesis  with 5L out, fluid studies nega
tive for SBP, SAAG>1.1 also has LE swelling and scrotal swelling, continue lasix to 40 mg an
d aldactone 100 mg 
 - no e/o hepatic encephalopathy and GI bleed
 
 # Diarrhea
 - patient reports that he has chronic diarrhea
 - stool PCR negative, C.dff negative
 - improved with loperamide and lomotil prn 
 - will need C-scope as OP 
 
  #Elevated Troponin, ACS rule out (POA)
 Patient was without chest pain, EKG showed <1mm st elevations V2-V3 unchanged on serial EKG
s, ventricular paced rhythm with history of pacemaker for bradycardia. No history of CAD and
 no history of known cardiac workup in past, has history of HTN, HLD, and DMII as risk facto
rs. Troponin 0.104 in [facility], repeat on arrival was 0.03 downtrending. Remains chest 
pain free
 - Troponin negative X 3 here
 - Nuclear stress test done : negative
 - 2D Echo with EF of 55-60%, no WMA and  normal functioning bioprosthetic aortic valve
 
 
 #IDDM
 Continue home lantus 15U HS
 ISS #1
 Glucose achs
 
 #HTN
 - continue home lisinopril
 
 #HLD
 Continue statin
 
 #AS s/p AVR
 #Bradycardia s/p PM
 -monitor tele
 
 
 Subjective 
 
 CC f/u generalized weakness and troponin elevation
 
 Patient reports that diarrhea has improved, discussed SNF placement, denies CP, no complain
s
 
 ROS
 See above
 
 Exam 
 
 Unchanged 
 
 GA: NAD, AAOX3
 Neck: no JVD, supple
 Cardiac: rrr, no m/g/r
 Lung: CTAB, normal respiratory effort
 Abdomen: soft, non tender, non distended, nabs
 Ext: 2+ LE swelling 
 
 Allergies:
 No Known Allergies
 
 Current Medications:
 Current Facility-Administered Medications 
 Medication Dose Route Frequency Provider Last Rate Last Dose 
   acetaminophen (TYLENOL) tablet 650 mg  650 mg Oral Q4H PRN Trace Reyes MD   65
0 mg at 20 2144 
   aluminum & magnesium hydroxide-simethicone (MAALOX PLUS REGULAR STRENGTH) 200-200-20 mg
/5 mL suspension 30 mL  30 mL Oral Q4H PRN Trace Reyes MD     
   aspirin chewable tablet 81 mg  81 mg Oral Daily Trace Reyes MD   81 mg at  
   atorvaSTATin (LIPITOR) tablet 80 mg  80 mg Oral Nightly Trace Reyes MD   80 mg
 at 20 
   dextrose 50% injection 12.5-25 g  12.5-25 g Intravenous PRN Trace Reyes MD    
 
  And 
   dextrose 10% (D10W) infusion   Intravenous Continuous PRN Trace Reyes MD     
   diphenoxylate-atropine (LOMOTIL) 2.5-0.025 mg/5 mL liquid 10 mL  10 mL Oral 4x Daily UT
N Janet Sosa MD   10 mL at 20 1134 
   docusate sodium (COLACE) capsule 100 mg  100 mg Oral BID PRN Trace Reyes MD   
  
   DULoxetine (CYMBALTA) DR capsule 30 mg  30 mg Oral Daily Trace Reyes MD   30 m
 
g at 20 09 
   folic acid tablet 1 mg  1 mg Oral Daily Trace Reyes MD   1 mg at 20
 
   furosemide (LASIX) tablet 40 mg  40 mg Oral Daily Janet Sosa MD   40 mg at  09 
   insulin glargine (LANTUS SOLOSTAR) injection (pen) 15 Units  15 Units Subcutaneous Nigh
tly Trace Reyes MD   15 Units at 20 
   insulin lispro (humaLOG KWIKPEN) injection (pen) 0-6 Units  0-6 Units Subcutaneous 4x D
aily WC and HS Trace Reyes MD   1 Units at 20 174 
   lisinopril (PRINIVIL, ZESTRIL) tablet 5 mg  5 mg Oral Daily Janet Sosa MD   5
 mg at 20 09 
   loperamide (IMODIUM) capsule 2 mg  2 mg Oral Q3H PRN Janet Sosa MD   2 mg at 
20 0634 
   ondansetron (ZOFRAN) injection 4 mg  4 mg Intravenous Q6H PRN Trace Reyes MD  
 4 mg at 20 0832 
   pantoprazole (PROTONIX) DR tablet 40 mg  40 mg Oral QAM AC Trace Reyes MD   40
 mg at 20 0631 
   spironolactone (ALDACTONE) tablet 100 mg  100 mg Oral Daily Janet Cale Sosa MD   1
00 mg at 20 0919 
   sucralfate (CARAFATE) tablet 1 g  1 g Oral 4x Daily Trace Reyes MD   1 g at  0922 
   tamsulosin (FLOMAX) capsule 0.8 mg  0.8 mg Oral Daily after breakfast Trace castellon MD   0.8 mg at 20 0919 
   traMADol (ULTRAM) tablet 50 mg  50 mg Oral Q6H PRN Trace Reyes MD   50 mg at 0
20 1646 
   traZODone (DESYREL) tablet 100 mg  100 mg Oral Nightly Trace Reyes MD   100 mg
 at 20 2122 
 
 Current Infusions:
   dextrose 10%   
 
 Objective Data 
 
 Point of care glucose
 Recent Labs 
 Lab 20
 1213 20
 0805 20
 0526 20
 2126 20
 1651 20
 1132 
 POCGLU 116* 88 92 85 90 116* 
 
 Labs last 24 hours
 Recent Results (from the past 24 hour(s)) 
 Clostridioides difficile NAAT Reflex 
  Collection Time: 20  2:09 PM 
 Result Value Ref Range 
  C. difficile, Interp Negative Negative 
  C. difficile, NAAT Negative  
 POC Glucose 
  Collection Time: 20  4:51 PM 
 Result Value Ref Range 
  Glucose, POC 90 70 - 109 mg/dL 
 POC Glucose 
  Collection Time: 20  9:26 PM 
 Result Value Ref Range 
  Glucose, POC 85 70 - 109 mg/dL 
 CBC with Differential 
 
  Collection Time: 20  5:25 AM 
 Result Value Ref Range 
  WBC 2.1 (LL) 4.0 - 11.0 K/uL 
  RBC 3.15 (L) 4.30 - 5.70 M/uL 
  Hemoglobin 10.0 (L) 13.5 - 18.0 g/dL 
  Hematocrit 28.9 (L) 40.0 - 51.0 % 
  MCV 91.7 83.0 - 101.0 fL 
  MCH 31.7 28.0 - 35.0 pg 
  MCHC 34.6 32.0 - 36.0 g/dL 
  RDW-CV 13.5 <15.0 % 
  RDW-SD 45.6 35.1 - 46.3 fL 
  Platelet Count 83 (L) 140 - 440 K/uL 
  MPV 11.3 6.5 - 12.4 fL 
  Immature Platelet Fraction 3.5 0.9 - 11.2 % 
  % Neutrophils 53.1 45.0 - 82.0 % 
  % Lymphocytes 37.0 20.0 - 45.0 % 
  % Monocytes 6.2 4.0 - 12.0 % 
  % Eosinophils 1.9 0.0 - 5.0 % 
  % Basophils 0.9 0.0 - 1.0 % 
  % Immature Granulocytes 0.9 (H) 0.0 - 0.4 % 
  Absolute Neutrophils 1.12 (L) 1.80 - 8.50 K/uL 
  Absolute Lymphocytes 0.78 0.60 - 3.20 K/uL 
  Absolute Monocytes 0.13 0.00 - 1.00 K/uL 
  Absolute Eosinophils 0.04 0.00 - 0.40 K/uL 
  Absolute Basophils 0.02 0.00 - 0.10 K/uL 
  Absolute Immature Granulocytes 0.02 0.00 - 0.03 K/uL 
  % nRBC 0 0 - 2 per 100 WBCs 
  Absolute nRBC 0.00 0.00 - 0.01 K/uL 
 Comprehensive Metabolic Panel 
  Collection Time: 20  5:25 AM 
 Result Value Ref Range 
  Na 134 (L) 136 - 145 mmol/L 
  K 5.1 3.4 - 5.1 mmol/L 
  Cl 108 (H) 98 - 107 mmol/L 
  CO2 23 20 - 31 mmol/L 
  Anion Gap 3 3 - 16 mmol/L 
  Glucose 90 60 - 106 mg/dL 
  BUN 21 9 - 23 mg/dL 
  Creatinine 1.06 0.70 - 1.30 mg/dL 
  eGFR if not [place]FR[place] >60 >=60 mL/min/1.73m2 
  Calcium 7.1 (L) 8.7 - 10.4 mg/dL 
  Albumin 2.0 (L) 3.2 - 4.8 g/dL 
  Bilirubin Total 0.6 0.3 - 1.2 mg/dL 
  Total Protein 5.5 (L) 5.7 - 8.2 g/dL 
  AST 54 (H) 0 - 34 U/L 
  ALT 24 10 - 49 U/L 
  Alkaline Phosphatase 109 46 - 116 U/L 
  Globulin 3.5 2.1 - 3.8 g/dL 
  Albumin/Globulin Ratio 0.6 (L) 0.8 - 1.9 
  BUN/Creatinine Ratio 19.8  
 Magnesium 
  Collection Time: 20  5:25 AM 
 Result Value Ref Range 
  Magnesium 1.9 1.6 - 2.6 mg/dL 
 POC Glucose 
  Collection Time: 20  5:26 AM 
 Result Value Ref Range 
  Glucose, POC 92 70 - 109 mg/dL 
 POC Glucose 
  Collection Time: 20  8:05 AM 
 
 Result Value Ref Range 
  Glucose, POC 88 70 - 109 mg/dL 
 POC Glucose 
  Collection Time: 20 12:13 PM 
 Result Value Ref Range 
  Glucose,  (H) 70 - 109 mg/dL 
 
 Micro results 
 Microbiology Results (72 hrs)  
  Procedure Component Value Units Date/Time 
  Clostridioides difficle NAAT reflex to Tox Ag [[id number]] Collected:  20 
  Order Status:  Completed Lab Status:  Final result Updated:  20 
  Specimen:  Stool  
  Narrative:    
  The following orders were created for panel order Clostridioides difficle NAAT reflex to T
ox Ag.
 Procedure                               Abnormality         Status                   
 ---------                               -----------         ------                   
 Clostridioides difficile...[[id number]]                      Final result             
 
 Please view results for these tests on the individual orders. 
  Clostridioides difficile NAAT Reflex [[id number]] Collected:  20 
  Order Status:  Completed Lab Status:  Final result Updated:  20 
  Specimen:  Stool  
   C. difficile, Interp Negative 
   Comment: No Toxigenic C. difficile detected. Consider other causes of Diarrhea. Repeat te
sting should not be performed within 7 days. 
  
   C. difficile, NAAT Negative 
  Stool Pathogens, NAAT [[id number]]  (Normal) Collected:  20 257 
  Order Status:  Completed Lab Status:  Final result Updated:  20 
  Specimen:  Stool  
   Campylobacter, NAAT Not Detected 
   Salmonella, NAAT Not Detected 
   Shigella NAAT Not Detected 
   Vibrio, NAAT Not Detected 
   Yersinia enterocolitica, NAAT Not Detected 
   Shigatoxin 1 Not Detected 
   Shigatoxin 2 Not Detected 
  Culture, Body Fluid, Sterile, Smear, with Anaerobes [[id number]] Collected:  20 
  Order Status:  Sent Lab Status:  In process Updated:  20 
  Specimen:  Body Fluid from Ascites  
  Narrative:    
  The following orders were created for panel order Culture, Body Fluid, Sterile, Smear, wit
h Anaerobes.
 Procedure                               Abnormality         Status                   
 ---------                               -----------         ------                   
 Culture, Body Fluid, Aerobe[[id number]]                      Preliminary result       
 Culture, Body Fluid, Carmela...[[id number]]                      In process               
 
 Please view results for these tests on the individual orders. 
  Culture, Body Fluid, Aerobe [[id number]] Collected:  20 
  Order Status:  Completed Lab Status:  Preliminary result Updated:  20 
  Specimen:  Body Fluid from Ascites  
   Culture No growth to date 
   Gram Stain Result 2+ White Blood Cells 
    No organisms seen 
  Culture, Body Fluid, Anaerobe [[id number]] Collected:  20 
  Order Status:  Resulted Lab Status:  In process Updated:  20 
  Specimen:  Body Fluid from Ascites  
 
  
 
 Radiology results 
 Us Guided Paracentesis
 
 Result Date: 2020
 EXAM: Ultrasound guided paracentesis. CLINICAL INFORMATION: Ascites COMPARISON: 2020. P
ROCEDURE: Informed consent was obtained and a final timeout was performed. Preliminary ultra
sound showed a peritoneal fluid collection. The skin was marked and sterilely prepped and dr
aped. 10 mL Lidocaine 1% infiltrated into the subcutaneous soft tissues. A small skin incisi
on was made. 8 French paracentesis needle and sheath was advanced under ultrasound guidance 
into the peritoneal fluid. The needle was removed. The catheter was fixed to suction. Aspira
te:  5000 ml of cloudy serous fluid. The catheter was removed and a sterile dressing was alda
ce. The patient tolerated procedure well without complication. FINDINGS: Moderate ascites. 
 
 Technically successful ultrasound guided paracentesis. Dictated and Signed by: Prasanth ibrahim MD  Electronically signed: 2020 5:12 PM 
 
 Vitals Ranges:
 Temp:  [35.8 C (96.4 F)-36.6 C (97.8 F)] 35.9 C (96.7 F)
 Pulse:  [67-80] 67
 Resp:  [19-20] 20
 BP: (109-139)/(60-74) 118/60
  
 Vitals:
 Temp: 35.9 C (96.7 F)         BP: 118/60        Pulse: 67       Resp: 20     SpO2: 97 %
 
 SpO2 97 % on room air at flow rate  L/min
  
 
 Janet Sosa MD  2020  1:27 PM
 [facility]
 
 Portions of this chart may have been created with Dragon voice recognition software. Occasi
onal wrong-word or   sound-alike  substitutions may have occurred due to the inherent garland
itations of voice recognition software. Please read the chart carefully and recognize, using
 context, where these substitutions have occurred
 
 Electronically signed by Janet Sosa MD at 2020  4:18 PM Janet Griggs MD - 2020 10:35 AM PSTFormatting of this note might be different from the origin
al.
 [facility]
 DION HAND
 HOSPITALIST PROGRESS NOTE
 
 Patient: Zeus Huang  
 :   1950:         
 Age:    69 y.o.
 MedRec: [id number]
 
 Admission date:    2020
 Hospital day #     : 2
 Physician author:  Janet Sosa MD
 
 Today:                    2020
  
 
 Assessment and Hospital Course 
 
 Active Hospital Problems 
 
  Diagnosis 
   Elevated troponin 
   ACS (acute coronary syndrome)  
   Cirrhosis of liver with ascites  
   Diabetes mellitus type 2, insulin dependent  
   Essential hypertension 
  
 Resolved Hospital Problems 
 No resolved problems to display. 
  
 Admission HPI:  69 y.o. male with a history of Hep C Cirrhosis with ascites, history of kelly
ices, HTN, AS s/p Aortic valve replacement, PM placement for bradycardia, brought to [facility] by EMS with chief complaint of weakness. Patient has had progressive weakness for ove
r past few weeks to months. Has shortness of breath with exertion and fatigue. On screening 
the patient was noted to have troponin of 0.104. Patient denies chest pain or history of hea
rt attack, does not note having a history of cardiac workup. However patient does have histo
ry of aortic stenosis s/p replacement and pacemaker placement for bradycardia. No known LHC 
as per patient. Has remote smoking history, has history of HTN, DMII, and HLD. Takes aspirin
 and statin at home. No known history of CHF. Given patient's troponin level despite no curr
ent cardiac symptoms, patient was transferred to [facility] for further cardiac workup, as t
his service is unavailable at [facility]. 
 
 Patient has been living independently at home but has had worsening weakness over the past 
month. Has had increased abdominal distention and worsening jaundice. EMS reported that he h
ad increased work of breathing at his home but never required O2 in the ER. Blood pressure s
table in Er, Heart rate  prior to transfer to [facility] ([facility] Was given 1L NS bolus at [facility] prior to transfer.
 
 EKG in [facility] 1250pm 20
 Ventricular paced rhythm, rate 109. <1mm ST elevations V2 and V3. No t wave inversions, no 
st depressions.
 
 
 Plan 
 
 # Deconditioning
 - awaiting PT/OT eval
 - will benefit from SNF
 
 # Cirrhosis with ascites and history of varices
 # Pancytopenia
 # History of Hep C, untreated
 Patient has Hep C has not undergone treatment for this. Has cirrhosis history and history o
f varices. Has stated that he has never had a paracentesis in the past for his ascites.
 - Abdominal US showed moderate ascites s/p paracentesis  with 5L out, fluid studies nega
tive for SBP, SAAG>1.1 also has LE swelling and scrotal swelling, continue lasix to 40 mg an
d aldactone 100 mg 
 - no e/o hepatic encephalopathy and GI bleed
 
 # Diarrhea
 - patient reports that he has chronic diarrhea
 - stool PCR negative, will check C.dff
 - loperamide and lomotil prn 
 - will need C-scope as OP 
 
  #Elevated Troponin, ACS rule out (POA)
 Patient was without chest pain, EKG showed <1mm st elevations V2-V3 unchanged on serial EKG
s, ventricular paced rhythm with history of pacemaker for bradycardia. No history of CAD and
 no history of known cardiac workup in past, has history of HTN, HLD, and DMII as risk facto
rs. Troponin 0.104 in [facility], repeat on arrival was 0.03 downtrending. Remains chest 
 
pain free
 - Troponin negative X 3 here
 - Nuclear stress test done : negative
 - 2D Echo with EF of 55-60%, no WMA and  normal functioning bioprosthetic aortic valve
 
 #IDDM
 Continue home lantus 15U HS
 ISS #1
 Glucose achs
 
 #HTN
 - continue home lisinopril
 
 #HLD
 Continue statin
 
 #AS s/p AVR
 #Bradycardia s/p PM
 -monitor tele
 
 
 Subjective 
 
 CC f/u generalized weakness and troponin elevation
 
 Patient reports he has been having multiple episodes of watery diarrhea, it has been going 
on for the past several months, c/o mild nausea, no vomiting, no other complains 
 
 ROS
 See above
 
 Exam 
 
 GA: NAD, AAOX3
 Neck: no JVD, supple
 Cardiac: rrr, no m/g/r
 Lung: CTAB, normal respiratory effort
 Abdomen: soft, non tender, non distended, nabs
 Ext: 2+ LE swelling 
 
 Allergies:
 No Known Allergies
 
 Current Medications:
 Current Facility-Administered Medications 
 Medication Dose Route Frequency Provider Last Rate Last Dose 
   acetaminophen (TYLENOL) tablet 650 mg  650 mg Oral Q4H PRN rTace Reyes MD   65
0 mg at 20 
   aluminum & magnesium hydroxide-simethicone (MAALOX PLUS REGULAR STRENGTH) 200-200-20 mg
/5 mL suspension 30 mL  30 mL Oral Q4H PRN Trace Reyes MD     
   aspirin chewable tablet 81 mg  81 mg Oral Daily Trace Reyes MD   81 mg at  0834 
   atorvaSTATin (LIPITOR) tablet 80 mg  80 mg Oral Nightly Trace Reyes MD   80 mg
 at 20 
   dextrose 50% injection 12.5-25 g  12.5-25 g Intravenous PRN Trace Reyes MD    
 
  And 
   dextrose 10% (D10W) infusion   Intravenous Continuous PRN Trace Reyes MD     
   diphenoxylate-atropine (LOMOTIL) 2.5-0.025 mg/5 mL liquid 10 mL  10 mL Oral 4x Daily UT
N Janet Sosa MD   10 mL at 20 
 
   docusate sodium (COLACE) capsule 100 mg  100 mg Oral BID PRN Trace Reyes MD   
  
   DULoxetine (CYMBALTA) DR capsule 30 mg  30 mg Oral Daily Trace Reyes MD   30 m
g at 20 0835 
   folic acid tablet 1 mg  1 mg Oral Daily Trace Reyes MD   1 mg at 20 0835
 
   furosemide (LASIX) tablet 40 mg  40 mg Oral Daily Janet Sosa MD   40 mg at  0834 
   insulin glargine (LANTUS SOLOSTAR) injection (pen) 15 Units  15 Units Subcutaneous Nigh
tly Trace Reyes MD   15 Units at 20 2038 
   insulin lispro (humaLOG KWIKPEN) injection (pen) 0-6 Units  0-6 Units Subcutaneous 4x D
aily WC and HS Trace Reyes MD   1 Units at 20 1740 
   lisinopril (PRINIVIL, ZESTRIL) tablet 5 mg  5 mg Oral Daily Janet Sosa MD   5
 mg at 20 0833 
   loperamide (IMODIUM) capsule 2 mg  2 mg Oral Q3H PRN Janet Sosa MD   2 mg at 
20 0634 
   ondansetron (ZOFRAN) injection 4 mg  4 mg Intravenous Q6H PRN Trace Reyes MD  
 4 mg at 20 0832 
   pantoprazole (PROTONIX) DR tablet 40 mg  40 mg Oral QAM AC Trace Reyes MD   40
 mg at 20 0634 
   spironolactone (ALDACTONE) tablet 100 mg  100 mg Oral Daily Janet Sosa MD   1
00 mg at 20 0834 
   sucralfate (CARAFATE) tablet 1 g  1 g Oral 4x Daily Trace Reyes MD   1 g at  0833 
   tamsulosin (FLOMAX) capsule 0.8 mg  0.8 mg Oral Daily after breakfast Trace castellon MD   0.8 mg at 20 0832 
   traMADol (ULTRAM) tablet 50 mg  50 mg Oral Q6H PRN Trace Reyes MD   50 mg at 0
20 2144 
   traZODone (DESYREL) tablet 100 mg  100 mg Oral Nightly Trace Reyes MD   100 mg
 at 20 2033 
 
 Current Infusions:
   dextrose 10%   
 
 Objective Data 
 
 Point of care glucose
 Recent Labs 
 Lab 20
 0636 20
 20320
 1654 20
 1202 20
 0639 20
 0314 
 POCGLU 120* 206* 170* 114* 188* 217* 
 
 Labs last 24 hours
 Recent Results (from the past 24 hour(s)) 
 POC Glucose 
  Collection Time: 20 12:02 PM 
 Result Value Ref Range 
  Glucose,  (H) 70 - 109 mg/dL 
 NM Nuclear Stress Test (Vasodilator) 
  Collection Time: 20 12:16 PM 
 Result Value Ref Range 
  BASELINE HEART RATE 74 bpm 
  BASELINE BLOOD PRESSURE 135/62 mmHg 
  PEAK HEART RATE 74  
  PEAK BLOOD PRESSURE 135/62 mmHG 
 
  Target   
  Percent HR 48  
  Max Predicted   
  LVEF-SPECT NUCLEAR STRESS/VIABILITY 58 % 
  ST Elevation (mm) 0.0 mm 
 Albumin, Body Fluid 
  Collection Time: 20  3:25 PM 
 Result Value Ref Range 
  Albumin, Body Fluid <1.0 g/dL 
  Specimen Source Ascites  
 Glucose, Body Fluid 
  Collection Time: 20  3:25 PM 
 Result Value Ref Range 
  Glucose, Body Fluid 177 mg/dL 
  Specimen Source Ascites  
 Lactate Dehydrogenase, Body Fluid 
  Collection Time: 20  3:25 PM 
 Result Value Ref Range 
  Lactate Dehydrogenase, Body Fluid 56 U/L 
  Specimen Source Ascites  
 Protein, Body Fluid 
  Collection Time: 20  3:25 PM 
 Result Value Ref Range 
  Protein, Body Fluid <2.0 g/dL 
  Specimen Source Ascites  
 Culture, Body Fluid, Aerobe 
  Collection Time: 20  3:25 PM 
 Result Value Ref Range 
  Culture No growth to date  
  Gram Stain Result 2+ White Blood Cells  
  Gram Stain Result No organisms seen  
 Cell Count with Differential, Body Fluid 
  Collection Time: 20  3:25 PM 
 Result Value Ref Range 
  Specimen Source Ascites  
  BF Color Colorless (A) (none) 
  BF Appearance Clear Clear 
  BF Nucleated cells 28 0 - 150 cells/uL 
  BF RBC <2,000 Reference Range Not Established cells/uL 
  BF % Neutrophils 10 % 
  BF % Lymphocytes 86 % 
  BF % Macro/Mono 4 % 
  BF Total Cells counted 100  
  % Mononuclear Cells, Body Fluid 87.0 % 
  BF Polynuclear % 13.0 % 
 PH, Body Fluid 
  Collection Time: 20  3:59 PM 
 Result Value Ref Range 
  Specimen Source Ascites  
  pH, Body Fluid 7.6 6.9 - 7.6 
 POC Glucose 
  Collection Time: 20  4:54 PM 
 Result Value Ref Range 
  Glucose,  (H) 70 - 109 mg/dL 
 Stool Pathogens, NAAT 
  Collection Time: 20  5:59 PM 
 Result Value Ref Range 
  Campylobacter, NAAT Not Detected Not Detected 
  Salmonella, NAAT Not Detected Not Detected 
  Shigella NAAT Not Detected Not Detected 
 
  Vibrio, NAAT Not Detected Not Detected 
  Yersinia enterocolitica, NAAT Not Detected Not Detected 
  Shigatoxin 1 Not Detected Not Detected 
  Shigatoxin 2 Not Detected Not Detected 
 POC Glucose 
  Collection Time: 20  8:37 PM 
 Result Value Ref Range 
  Glucose,  (H) 70 - 109 mg/dL 
 Comprehensive Metabolic Panel 
  Collection Time: 20  4:13 AM 
 Result Value Ref Range 
  Na 137 136 - 145 mmol/L 
  K 4.9 3.4 - 5.1 mmol/L 
  Cl 107 98 - 107 mmol/L 
  CO2 26 20 - 31 mmol/L 
  Anion Gap 4 3 - 16 mmol/L 
  Glucose 146 (H) 60 - 106 mg/dL 
  BUN 22 9 - 23 mg/dL 
  Creatinine 1.09 0.70 - 1.30 mg/dL 
  eGFR if not AFRICAN AMERICAN >60 >=60 mL/min/1.73m2 
  Calcium 7.2 (L) 8.7 - 10.4 mg/dL 
  Albumin 1.8 (L) 3.2 - 4.8 g/dL 
  Bilirubin Total 0.3 0.3 - 1.2 mg/dL 
  Total Protein 5.0 (L) 5.7 - 8.2 g/dL 
  AST 44 (H) 0 - 34 U/L 
  ALT 23 10 - 49 U/L 
  Alkaline Phosphatase 90 46 - 116 U/L 
  Globulin 3.2 2.1 - 3.8 g/dL 
  Albumin/Globulin Ratio 0.6 (L) 0.8 - 1.9 
  BUN/Creatinine Ratio 20.2  
 Magnesium 
  Collection Time: 20  4:13 AM 
 Result Value Ref Range 
  Magnesium 2.0 1.6 - 2.6 mg/dL 
 POC Glucose 
  Collection Time: 20  6:36 AM 
 Result Value Ref Range 
  Glucose,  (H) 70 - 109 mg/dL 
 CBC with Differential 
  Collection Time: 20  6:45 AM 
 Result Value Ref Range 
  WBC 2.9 (L) 4.0 - 11.0 K/uL 
  RBC 3.22 (L) 4.30 - 5.70 M/uL 
  Hemoglobin 10.1 (L) 13.5 - 18.0 g/dL 
  Hematocrit 29.9 (L) 40.0 - 51.0 % 
  MCV 92.9 83.0 - 101.0 fL 
  MCH 31.4 28.0 - 35.0 pg 
  MCHC 33.8 32.0 - 36.0 g/dL 
  RDW-CV 13.6 <15.0 % 
  RDW-SD 46.6 (H) 35.1 - 46.3 fL 
  Platelet Count 90 (L) 140 - 440 K/uL 
  MPV 10.4 6.5 - 12.4 fL 
  Immature Platelet Fraction 2.3 0.9 - 11.2 % 
  % Neutrophils 59.5 45.0 - 82.0 % 
  % Lymphocytes 29.8 20.0 - 45.0 % 
  % Monocytes 8.1 4.0 - 12.0 % 
  % Eosinophils 1.8 0.0 - 5.0 % 
  % Basophils 0.4 0.0 - 1.0 % 
  % Immature Granulocytes 0.4 0.0 - 0.4 % 
  Absolute Neutrophils 1.70 (L) 1.80 - 8.50 K/uL 
 
  Absolute Lymphocytes 0.85 0.60 - 3.20 K/uL 
  Absolute Monocytes 0.23 0.00 - 1.00 K/uL 
  Absolute Eosinophils 0.05 0.00 - 0.40 K/uL 
  Absolute Basophils 0.01 0.00 - 0.10 K/uL 
  Absolute Immature Granulocytes 0.01 0.00 - 0.03 K/uL 
  % nRBC 0 0 - 2 per 100 WBCs 
  Absolute nRBC 0.00 0.00 - 0.01 K/uL 
 
 Micro results 
 Microbiology Results (72 hrs)  
  Procedure Component Value Units Date/Time 
  Stool Pathogens, NAAT [[id number]]  (Normal) Collected:  20 
  Order Status:  Completed Lab Status:  Final result Updated:  20 
  Specimen:  Stool  
   Campylobacter, NAAT Not Detected 
   Salmonella, NAAT Not Detected 
   Shigella NAAT Not Detected 
   Vibrio, NAAT Not Detected 
   Yersinia enterocolitica, NAAT Not Detected 
   Shigatoxin 1 Not Detected 
   Shigatoxin 2 Not Detected 
  Culture, Body Fluid, Sterile, Smear, with Anaerobes [[id number]] Collected:  20 152 
  Order Status:  Sent Lab Status:  In process Updated:  20 
  Specimen:  Body Fluid from Ascites  
  Narrative:    
  The following orders were created for panel order Culture, Body Fluid, Sterile, Smear, wit
h Anaerobes.
 Procedure                               Abnormality         Status                   
 ---------                               -----------         ------                   
 Culture, Body Fluid, Aerobe[[id number]]                      Preliminary result       
 Culture, Body Fluid, Carmela...[[id number]]                      In process               
 
 Please view results for these tests on the individual orders. 
  Culture, Body Fluid, Aerobe [[id number]] Collected:  20 1525 
  Order Status:  Completed Lab Status:  Preliminary result Updated:  20 
  Specimen:  Body Fluid from Ascites  
   Culture No growth to date 
   Gram Stain Result 2+ White Blood Cells 
    No organisms seen 
  Culture, Body Fluid, Anaerobe [[id number]] Collected:  20 152 
  Order Status:  Resulted Lab Status:  In process Updated:  20 
  Specimen:  Body Fluid from Ascites  
  
 
 Radiology results 
 Us Guided Paracentesis
 
 Result Date: 2020
 EXAM: Ultrasound guided paracentesis. CLINICAL INFORMATION: Ascites COMPARISON: 2020. P
ROCEDURE: Informed consent was obtained and a final timeout was performed. Preliminary ultra
sound showed a peritoneal fluid collection. The skin was marked and sterilely prepped and dr
aped. 10 mL Lidocaine 1% infiltrated into the subcutaneous soft tissues. A small skin incisi
on was made. 8 French paracentesis needle and sheath was advanced under ultrasound guidance 
into the peritoneal fluid. The needle was removed. The catheter was fixed to suction. Aspira
te:  5000 ml of cloudy serous fluid. The catheter was removed and a sterile dressing was alda
ce. The patient tolerated procedure well without complication. FINDINGS: Moderate ascites. 
 
 Technically successful ultrasound guided paracentesis. Dictated and Signed by: Prasanth ibrahim MD  Electronically signed: 2020 5:12 PM 
 
 
 Us Abdomen Limited
 
 Result Date: 2020
 LIMITED ULTRASOUND ABDOMEN 2020 7:08 AM CLINICAL HISTORY: ascites, history of cirrhosis
 COMPARISON: None available FINDINGS: The pancreas is somewhat echogenic but otherwise unrem
arkable.  The liver measures 15.5 cm in craniocaudal dimension.  The liver demonstrates lobu
lar contour and apparent caudate lobe enlargement, consistent with cirrhosis.  No hepatic ma
ss is identified.  The gallbladder contains echogenic, shadowing material potentially reflec
ting a combination of gallstones and sludge, without associated wall thickening.  A 4 mm rou
nded, echogenic, nondependent structure along the margin of the gallbladder lumen may reflec
t a polyp.  Sonographic Rodriguez sign is reportedly negative.  The common duct measures up to 
5 mm in diameter.  The right kidney measures 11 cm long axis and is unremarkable, without hy
dronephrosis.  The spleen is enlarged up to a long axis of 16.1 cm.  Moderate anechoic ascit
es is visible.  A right pleural effusion is also noted.  The portal and hepatic veins are pa
tent and demonstrate appropriately directed flow. 
 
 1.  STIGMATA OF CIRRHOSIS WITH SPLENOMEGALY AND ASCITES. 2.  CHOLELITHIASIS. 3.  RIGHT PLEU
RAL EFFUSION. Dictated and Signed by: Emerson Adams MD  Electronically signed: 2020 8:56 
AM 
 
 Vitals Ranges:
 Temp:  [36.3 C (97.3 F)-37.2 C (99 F)] 37.1 C (98.7 F)
 Pulse:  [76-88] 87
 Resp:  [16-20] 20
 BP: (119-147)/(64-72) 147/72
  
 Vitals:
 Temp: 37.1 C (98.7 F)         BP: 147/72        Pulse: 87       Resp: 20     SpO2: 96 %
 
 SpO2 96 % on room air at flow rate  L/min
  
 
 Janet Sosa MD  2020  10:36 AM
 [facility]
 
 Portions of this chart may have been created with Dragon voice recognition software. Occasi
onal wrong-word or   sound-alike  substitutions may have occurred due to the inherent garland
itations of voice recognition software. Please read the chart carefully and recognize, using
 context, where these substitutions have occurred
 
 Electronically signed by Janet Sosa MD at 2020  3:54 PM Janet Griggs MD - 2020 11:51 AM PSTFormatting of this note might be different from the origin
al.
 [facility]
 DION HAND
 HOSPITALIST PROGRESS NOTE
 
 Patient: Zeus Huang  
 :   1950:         
 Age:    69 y.o.
 MedRec: [id number]
 
 Admission date:    2020
 Hospital day #     : 1
 Physician author:  Janet Sosa MD
 
 Today:                    2020
  
 
 Assessment and Hospital Course 
 
 
 Active Hospital Problems 
  Diagnosis 
   Elevated troponin 
   ACS (acute coronary syndrome)  
   Cirrhosis of liver with ascites  
   Diabetes mellitus type 2, insulin dependent  
   Essential hypertension 
  
 Resolved Hospital Problems 
 No resolved problems to display. 
  
 Admission HPI:  69 y.o. male with a history of Hep C Cirrhosis with ascites, history of kelly
ices, HTN, AS s/p Aortic valve replacement, PM placement for bradycardia, brought to [facility] by EMS with chief complaint of weakness. Patient has had progressive weakness for ove
r past few weeks to months. Has shortness of breath with exertion and fatigue. On screening 
the patient was noted to have troponin of 0.104. Patient denies chest pain or history of hea
rt attack, does not note having a history of cardiac workup. However patient does have histo
ry of aortic stenosis s/p replacement and pacemaker placement for bradycardia. No known LHC 
as per patient. Has remote smoking history, has history of HTN, DMII, and HLD. Takes aspirin
 and statin at home. No known history of CHF. Given patient's troponin level despite no curr
ent cardiac symptoms, patient was transferred to [facility] for further cardiac workup, as t
his service is unavailable at [facility]. 
 
 Patient has been living independently at home but has had worsening weakness over the past 
month. Has had increased abdominal distention and worsening jaundice. EMS reported that he h
ad increased work of breathing at his home but never required O2 in the ER. Blood pressure s
table in Er, Heart rate  prior to transfer to [facility] Was given 1L NS bolus at [facility] prior to transfer.
 
 EKG in [facility] 1250pm 20
 Ventricular paced rhythm, rate 109. <1mm ST elevations V2 and V3. No t wave inversions, no 
st depressions.
 
 
 Plan 
 
  #Elevated Troponin, ACS rule out
 Patient was without chest pain, EKG showed <1mm st elevations V2-V3 unchanged on serial EKG
s, ventricular paced rhythm with history of pacemaker for bradycardia. No history of CAD and
 no history of known cardiac workup in past, has history of HTN, HLD, and DMII as risk facto
rs. Troponin 0.104 in [facility], repeat on arrival was 0.03 downtrending. Remains chest 
pain free
 - Troponin negative X 3 here
 - Nuclear stress test done this morning: negative
 - 2D Echo with EF of 55-60%, no WMA and  normal functioning bioprosthetic aortic valve
 
 # Deconditioning
 - PT/OT
 - will benefit from SNF
 
 #Cirrhosis with ascites and history of varices
 # Pancytopenia
 #History of Hep C, untreated
 Patient has Hep C has not undergone treatment for this. Has cirrhosis history and history o
f varices. Has stated that he has never had a paracentesis in the past for his ascites.
 - Abdominal US showed moderate ascites s/p paracentesis, fluid studies negative for SBP, SA
AG>1.1 also has LE swelling and scrotal swelling, will increase home lasix to 40 mg and add 
aldactone 100 mg 
 - no e/o hepatic encephalopathy and GI bleed
 
 
 #IDDM
 Continue home lantus 15U HS
 ISS #1
 Glucose achs
 
 #HTN
 - restarted home lisinopril
 
 #HLD
 Continue statin
 
 #AS s/p AVR
 #Bradycardia s/p PM
 -monitor tele
 
 
 Subjective 
 
 CC f/u generalized weakness and troponin elevation
 
 Patient currently getting paracentesis, he feels weak, he denies chest pain/SOB/abdominal p
ain/GI bleed/confusion
 
 ROS
 See above
 
 Exam 
 
 GA: NAD, AAOX3
 Neck: no JVD, supple
 Cardiac: rrr, no m/g/r
 Lung: CTAB, normal respiratory effort
 Abdomen: soft, non tender, mildly distended, nabs
 Ext: 2+ LE swelling 
 
 Allergies:
 No Known Allergies
 
 Current Medications:
 Current Facility-Administered Medications 
 Medication Dose Route Frequency Provider Last Rate Last Dose 
   acetaminophen (TYLENOL) tablet 650 mg  650 mg Oral Q4H PRN Trace Reyes MD   65
0 mg at 20 
   aluminum & magnesium hydroxide-simethicone (MAALOX PLUS REGULAR STRENGTH) 200-200-20 mg
/5 mL suspension 30 mL  30 mL Oral Q4H PRN Trace Reyes MD     
   aspirin chewable tablet 81 mg  81 mg Oral Daily Trace Reyes MD   81 mg at  09 
   atorvaSTATin (LIPITOR) tablet 80 mg  80 mg Oral Nightly Trace Reyes MD   80 mg
 at 20 
   dextrose 50% injection 12.5-25 g  12.5-25 g Intravenous PRN Trace Reyes MD    
 
  And 
   dextrose 10% (D10W) infusion   Intravenous Continuous PRN Trace Reyes MD     
   docusate sodium (COLACE) capsule 100 mg  100 mg Oral BID PRN Trace Reyes MD   
  
   DULoxetine (CYMBALTA) DR capsule 30 mg  30 mg Oral Daily Trace Reyes MD   30 m
g at 20 0954 
   folic acid tablet 1 mg  1 mg Oral Daily Trace Reyes MD   1 mg at 20 0953
 
 
   furosemide (LASIX) tablet 10 mg  10 mg Oral Daily Trace Reyes MD   10 mg at  0954 
   heparin 5,000 units/mL injection 5,000 Units  5,000 Units Subcutaneous 2 times per day 
Trace Reyes MD   5,000 Units at 20 1001 
   insulin glargine (LANTUS SOLOSTAR) injection (pen) 15 Units  15 Units Subcutaneous Nigh
tly Trace Reyes MD   15 Units at 20 2201 
   insulin lispro (humaLOG KWIKPEN) injection (pen) 0-6 Units  0-6 Units Subcutaneous 4x D
aily WC and HS Trace Reyes MD   1 Units at 20 0944 
   ondansetron (ZOFRAN) injection 4 mg  4 mg Intravenous Q6H PRN Trace Reyes MD  
   
   pantoprazole (PROTONIX) DR tablet 40 mg  40 mg Oral QAM AC Trace Reyes MD     
   sucralfate (CARAFATE) tablet 1 g  1 g Oral 4x Daily Trace Reyes MD   1 g at  0953 
   tamsulosin (FLOMAX) capsule 0.8 mg  0.8 mg Oral Daily after breakfast Trace castellon MD   0.8 mg at 20 0953 
   traMADol (ULTRAM) tablet 50 mg  50 mg Oral Q6H PRN Trace Reyes MD   50 mg at 0
20 2144 
   traZODone (DESYREL) tablet 100 mg  100 mg Oral Nightly Trace Reyes MD   100 mg
 at 20 2137 
 
 Current Infusions:
   dextrose 10%   
 
 Objective Data 
 
 Point of care glucose
 Recent Labs 
 Lab 20
 0639 20
 0314 20
 2201 
 POCGLU 188* 217* 329* 
 
 Labs last 24 hours
 Recent Results (from the past 24 hour(s)) 
 Troponin I 
  Collection Time: 20  6:15 PM 
 Result Value Ref Range 
  Troponin I 0.03 <0.06 ng/mL 
 TSH 
  Collection Time: 20  6:15 PM 
 Result Value Ref Range 
  TSH 4.43 0.55 - 4.78 uIU/mL 
 Lipid Panel 
  Collection Time: 20  6:15 PM 
 Result Value Ref Range 
  Triglycerides 115 <=150 mg/dL 
  Cholesterol 104 <=200 mg/dL 
  HDL 22 (L) 40 - 60 mg/dL 
  Chol/HDL Ratio 4.7  
  LDL, Calculated 59 <=130 mg/dL 
 ECG 12 lead 
  Collection Time: 20  6:22 PM 
 Result Value Ref Range 
  VENTRICULAR RATE EKG 96 BPM 
  QRS DURATION 148 ms 
  Q-T INTERVAL 410 ms 
  Q-T INTERVAL (CORRECTED) 517 ms 
  QRS AXIS 94 degrees 
  T AXIS 35 degrees 
 
  INTERPRETATION TEXT   
   Ventricular-paced rhythm
 Abnormal ECG
 No previous ECGs available
 Confirmed by JAREN MEDEL MD ([id number]) on 2020 7:08:10 AM
  
 Hemoglobin A1C 
  Collection Time: 20  6:38 PM 
 Result Value Ref Range 
  Hemoglobin A1c 10.5 (H) 4.3 - 6.0 % 
  Estimated Average Glucose 255 mg/dL 
 Lactate Dehydrogenase 
  Collection Time: 20  7:56 PM 
 Result Value Ref Range 
  LDH TOTAL 309 (H) 120 - 246 U/L 
 Protime INR 
  Collection Time: 20  7:56 PM 
 Result Value Ref Range 
  Prothrombin Time 14.8 (H) 11.3 - 13.9 seconds 
  INR 1.2 (H) 0.9 - 1.1 
 Extra Lavender Top Tube 
  Collection Time: 20  8:03 PM 
 Result Value Ref Range 
  Extra Lavender Top Tube Done  
 Extra Gold Top Tube 
  Collection Time: 20  8:05 PM 
 Result Value Ref Range 
  Extra Gold Top Tube Done  
 Ammonia 
  Collection Time: 20  8:19 PM 
 Result Value Ref Range 
  Ammonia 14 11 - 32 umol/L 
 POC Glucose 
  Collection Time: 20 10:01 PM 
 Result Value Ref Range 
  Glucose,  (H) 70 - 109 mg/dL 
 Troponin I 
  Collection Time: 20 11:55 PM 
 Result Value Ref Range 
  Troponin I 0.04 <0.06 ng/mL 
 POC Glucose 
  Collection Time: 20  3:14 AM 
 Result Value Ref Range 
  Glucose,  (H) 70 - 109 mg/dL 
 ECG 12 lead 
  Collection Time: 20  4:54 AM 
 Result Value Ref Range 
  VENTRICULAR RATE EKG 68 BPM 
  ATRIAL RATE 68 BPM 
  P-R INTERVAL 332 ms 
  QRS DURATION 150 ms 
  Q-T INTERVAL 508 ms 
  Q-T INTERVAL (CORRECTED) 540 ms 
  P WAVE AXIS 74 degrees 
  QRS AXIS 116 degrees 
  T AXIS 21 degrees 
  INTERPRETATION TEXT   
   Atrial-sensed ventricular-paced rhythm with prolonged AV conduction
 Abnormal ECG
 When compared with ECG of 2020 18:22, (Unconfirmed)
 
 Vent. rate has decreased BY  28 BPM
 Confirmed by JAREN MEDEL MD ([id number]) on 2020 7:08:52 AM
  
 CBC with Differential 
  Collection Time: 20  6:02 AM 
 Result Value Ref Range 
  WBC 3.2 (L) 4.0 - 11.0 K/uL 
  RBC 2.85 (L) 4.30 - 5.70 M/uL 
  Hemoglobin 8.9 (L) 13.5 - 18.0 g/dL 
  Hematocrit 26.8 (L) 40.0 - 51.0 % 
  MCV 94.0 83.0 - 101.0 fL 
  MCH 31.2 28.0 - 35.0 pg 
  MCHC 33.2 32.0 - 36.0 g/dL 
  RDW-CV 14.0 <15.0 % 
  RDW-SD 48.1 (H) 35.1 - 46.3 fL 
  Platelet Count 87 (L) 140 - 440 K/uL 
  MPV 10.6 6.5 - 12.4 fL 
  Immature Platelet Fraction 2.4 0.9 - 11.2 % 
  % Neutrophils 70.9 45.0 - 82.0 % 
  % Lymphocytes 21.9 20.0 - 45.0 % 
  % Monocytes 6.3 4.0 - 12.0 % 
  % Eosinophils 0.3 0.0 - 5.0 % 
  % Basophils 0.3 0.0 - 1.0 % 
  % Immature Granulocytes 0.3 0.0 - 0.4 % 
  Absolute Neutrophils 2.26 1.80 - 8.50 K/uL 
  Absolute Lymphocytes 0.70 0.60 - 3.20 K/uL 
  Absolute Monocytes 0.20 0.00 - 1.00 K/uL 
  Absolute Eosinophils 0.01 0.00 - 0.40 K/uL 
  Absolute Basophils 0.01 0.00 - 0.10 K/uL 
  Absolute Immature Granulocytes 0.01 0.00 - 0.03 K/uL 
  % nRBC 0 0 - 2 per 100 WBCs 
  Absolute nRBC 0.00 0.00 - 0.01 K/uL 
 Magnesium 
  Collection Time: 20  6:02 AM 
 Result Value Ref Range 
  Magnesium 2.1 1.6 - 2.6 mg/dL 
 Comprehensive Metabolic Panel 
  Collection Time: 20  6:02 AM 
 Result Value Ref Range 
  Na 135 (L) 136 - 145 mmol/L 
  K 4.1 3.4 - 5.1 mmol/L 
  Cl 105 98 - 107 mmol/L 
  CO2 28 20 - 31 mmol/L 
  Anion Gap 2 (L) 3 - 16 mmol/L 
  Glucose 188 (H) 60 - 106 mg/dL 
  BUN 22 9 - 23 mg/dL 
  Creatinine 1.12 0.70 - 1.30 mg/dL 
  eGFR if not AFRICAN AMERICAN >60 >=60 mL/min/1.73m2 
  Calcium 7.1 (L) 8.7 - 10.4 mg/dL 
  Albumin 1.8 (L) 3.2 - 4.8 g/dL 
  Bilirubin Total 0.5 0.3 - 1.2 mg/dL 
  Total Protein 5.1 (L) 5.7 - 8.2 g/dL 
  AST 34 0 - 34 U/L 
  ALT 23 10 - 49 U/L 
  Alkaline Phosphatase 90 46 - 116 U/L 
  Globulin 3.3 2.1 - 3.8 g/dL 
  Albumin/Globulin Ratio 0.5 (L) 0.8 - 1.9 
  BUN/Creatinine Ratio 19.6  
 Troponin I 
  Collection Time: 20  6:02 AM 
 
 Result Value Ref Range 
  Troponin I 0.04 <0.06 ng/mL 
 POC Glucose 
  Collection Time: 20  6:39 AM 
 Result Value Ref Range 
  Glucose,  (H) 70 - 109 mg/dL 
 ECHO Complete 
  Collection Time: 20  8:24 AM 
 Result Value Ref Range 
  Ascending aorta 3.16 cm 
  AV mean gradient 10.69 mmHg 
  Aortic Valve Area by Continuity VTI 0.91 cm2 
  LVOT diameter 1.96 cm 
  LVOT peak katelyn 60.62 cm/s 
  LVOT peak VTI 14.04 cm 
  AV peak katelyn 212.74 cm/s 
  AV  VTI 46.62 cm 
  AV peak gradient 18.1 mmHg 
  AV LVOT Peak Gradient 1.47 mmHg 
  AV LVOT Mean Gradient 0.65 mmHg 
  TR Peak Gradient 20 mmHg 
  TR Velocity 225.58 cm 
  LV Rodgers's Biplane EF 55 % 
  LVOT Mean Velocity 37.1 cm/s 
  MV Deceleration Time 224.77 msec 
  MV Peak E-Wave 111.33 cm/s 
  AV Mean Velocity 155.47 cm/s 
  LA Major 0.4662 cm 
  Cardiac Output 3.09 l/min 
  Vitals Heart Rate Rest 73  
  Vitals BP Systolic 94  
  Vitals BP Diastolic 51  
  Vitals Height 165.1  
  Vitals Weight 81.70  
  Aortic Root Diameter 2.25 cm 
  LVEF-TTE TRANSTHORACIC ECHO 60 % 
  RA PRESSURE 3 mmHg 
  RVSP Estimated 23 mmHg 
 NM Nuclear Stress Test (Vasodilator) 
  Collection Time: 20  9:28 AM 
 Result Value Ref Range 
  BASELINE HEART RATE 74 bpm 
  BASELINE BLOOD PRESSURE 135/62 mmHg 
  PEAK HEART RATE 74  
  PEAK BLOOD PRESSURE 135/62 mmHG 
  Target   
  Percent HR 48  
  Max Predicted   
 
 Micro results 
 Microbiology Results (72 hrs)  
  ** No results found for the last 72 hours. ** 
  
 
 Radiology results 
 Us Abdomen Limited
 
 Result Date: 2020
 LIMITED ULTRASOUND ABDOMEN 2020 7:08 AM CLINICAL HISTORY: ascites, history of cirrhosis
 COMPARISON: None available FINDINGS: The pancreas is somewhat echogenic but otherwise unrem
 
arkable.  The liver measures 15.5 cm in craniocaudal dimension.  The liver demonstrates lobu
lar contour and apparent caudate lobe enlargement, consistent with cirrhosis.  No hepatic ma
ss is identified.  The gallbladder contains echogenic, shadowing material potentially reflec
ting a combination of gallstones and sludge, without associated wall thickening.  A 4 mm rou
nded, echogenic, nondependent structure along the margin of the gallbladder lumen may reflec
t a polyp.  Sonographic Rodriguez sign is reportedly negative.  The common duct measures up to 
5 mm in diameter.  The right kidney measures 11 cm long axis and is unremarkable, without hy
dronephrosis.  The spleen is enlarged up to a long axis of 16.1 cm.  Moderate anechoic ascit
es is visible.  A right pleural effusion is also noted.  The portal and hepatic veins are pa
tent and demonstrate appropriately directed flow. 
 
 1.  STIGMATA OF CIRRHOSIS WITH SPLENOMEGALY AND ASCITES. 2.  CHOLELITHIASIS. 3.  RIGHT PLEU
RAL EFFUSION. Dictated and Signed by: Emerson Adams MD  Electronically signed: 2020 8:56 
AM 
 
 Vitals Ranges:
 Temp:  [36.1 C (97 F)-36.7 C (98.1 F)] 36.3 C (97.3 F)
 Pulse:  [74-95] 80
 Resp:  [16-20] 20
 BP: ()/(51-72) 122/64
  
 Vitals:
 Temp: 36.3 C (97.3 F)         BP: 122/64        Pulse: 80       Resp: 20     SpO2: 96 %
 
 SpO2 96 % on   at flow rate  L/min
  
 
 Janet Sosa MD  2020  11:51 AM
 [facility]
 
 Portions of this chart may have been created with Dragon voice recognition software. Occasi
onal wrong-word or   sound-alike  substitutions may have occurred due to the inherent garland
itations of voice recognition software. Please read the chart carefully and recognize, using
 context, where these substitutions have occurred
 
 Electronically signed by Janet Sosa MD at 2020  4:53 PM PSTdocumented in th
is encounter
 
 H&P Notes
 Trace Reyes MD - 2020  7:38 PM PSTFormatting of this note might be differen
t from the original.
 [facility]
 DION HAND
 HOSPITALIST HISTORY & PHYSICAL
 
 Patient: Zeus Huang  
 :   1950:         
 Age:    69 y.o.
 MedRec: [id number]
 
 Admission date:    2020
 Hospital day #     : 0
 Physician author:  Trace Reyes MD
 
 Today:                    2020
  
 
 CHIEF COMPLAINT: 
  
 Elevated troponin
 
 Weakness
 
 HISTORY OF PRESENT ILLNESS: 
 
 This is a 69 y.o. male with a history of Hep C Cirrhosis with ascites, history of varices, 
HTN, AS s/p Aortic valve replacement, PM placement for bradycardia, brought to [facility]
 by EMS with chief complaint of weakness. Patient has had progressive weakness for over past
 few weeks to months. Has shortness of breath with exertion and fatigue. On screening the pa
tient was noted to have troponin of 0.104. Patient denies chest pain or history of heart att
ack, does not note having a history of cardiac workup. However patient does have history of 
aortic stenosis s/p replacement and pacemaker placement for bradycardia. No known LHC as per
 patient. Has remote smoking history, has history of HTN, DMII, and HLD. Takes aspirin and s
tatin at home. No known history of CHF. Given patient's troponin level despite no current ca
rdiac symptoms, patient was transferred to [facility] for further cardiac workup, as this se
rvice is unavailable at [facility]. 
 
 Patient has been living independently at home but has had worsening weakness over the past 
month. Has had increased abdominal distention and worsening jaundice. EMS reported that he h
ad increased work of breathing at his home but never required O2 in the ER. Blood pressure s
table in Er, Heart rate  prior to transfer to [facility] [facility] Was given 1L NS bolus at [facility] prior to transfer.
 
 EKG in [facility] 1250pm 20
 Ventricular paced rhythm, rate 109. <1mm ST elevations V2 and V3. No t wave inversions, no 
st depressions.
 
  [place] [facility]: 
 WBC 5.2
 Hgb 10.9
 HCT: 32.8
 PLT: 106
 
 Glucose 315
 BUN 22
 Cr 1.10
 Na 133
 K 4.9
 Cl 103
 HCO3 23
 Mg 1.4
 Ca 7.6
 Alb 1.9
 AST 40
 ALT 27
 Alk phos: 97
 t bili 0.6
 Trop 0.104
 
 PAST MEDICAL and SURGICAL HISTORY: 
 
 Past Medical History: 
 Diagnosis Date 
   Aortic stenosis  
  s/p aortic valve replacement 
   Cirrhosis of liver with ascites (HCC)  
   Diabetes mellitus type 2, insulin dependent (HCC)  
   Hepatitis C  
   Hypertension  
   Pacemaker  
 
 
 Past Surgical History: 
 Procedure Laterality Date 
   AORTIC VALVE REPLACEMENT   
   PACEMAKER PLACEMENT   
 
 FAMILY HISTORY: 
 
 family history includes No known problems in his father and mother. 
 
 SOCIAL HISTORY: 
 
  reports that he quit smoking about 25 years ago. His smoking use included cigarettes. He h
as never used smokeless tobacco. He reports previous alcohol use. He reports that he does no
t use drugs. 
 
 REVIEW OF SYSTEMS: 
 
 A complete 10 system ROS was done and recorded in the HPI.
 
 Further notations, if any, will be noted below.
 
 HOME MEDICATIONS: 
 
 PT REPORTED TAKING NOT TAKING  
  Medication Sig Last Dose Dispense Doc. Provider 
  aspirin 81 mg EC tablet Take 81 mg by mouth Daily.   Historical Provider, MD 
  atorvaSTATin (LIPITOR) 40 mg tablet Take 40 mg by mouth nightly.   Historical Provider, MD
 
  DULoxetine (CYMBALTA) 30 mg DR capsule Take 30 mg by mouth Daily.   Historical Provider, M
D 
  folic acid 1 mg tablet Take 1 mg by mouth Daily.   Historical Provider, MD 
  furosemide (LASIX) 20 mg tablet Take 10 mg by mouth Daily.   Historical Provider, MD 
  hydrOXYzine hydrochloride (ATARAX) 25 mg tablet Take 25 mg by mouth every 8 hours as neede
d for Anxiety.   Historical Provider, MD 
  insulin aspart (NOVOLOG) 100 units/mL injection Inject 10 Units under the skin 3 times rodrigo
ly (before meals).   Historical Provider, MD 
  insulin glargine (LANTUS) 100 units/mL injection (vial) Inject 15 Units under the skin nig
htly.   Historical Provider, MD 
  lisinopril (PRINIVIL, ZESTRIL) 5 mg tablet Take 5 mg by mouth Daily.   Historical Provider
, MD 
  magnesium oxide (MAG-OX) 400 mg tablet Take 400 mg by mouth Daily.   Historical Provider, 
MD 
  nicotine (NICODERM) 14 mg/24 hr Place 1 patch onto the skin every 24 hours.   Historical P
MD madeleine 
  pantoprazole (PROTONIX) 40 mg tablet Take 40 mg by mouth every morning (before breakfast).
   Historical Provider, MD 
  sucralfate (CARAFATE) 1 g tablet Take 1 g by mouth 4 times daily.   Historical Provider, M
D 
  tamsulosin (FLOMAX) 0.4 mg CAPS Take 0.8 mg by mouth daily (after breakfast).   Historical
 Provider, MD 
  traMADol (ULTRAM) 50 mg tablet Take 50 mg by mouth every 6 hours as needed for Pain.   His
torical Provider, MD 
  traZODone (DESYREL) 100 mg tablet Take 100 mg by mouth nightly.   Historical Provider, MD 
  
 
 ALLERGIES: 
 
 Allergies not on file
 
 VITAL SIGNS: 
 
 
 Temp: 36.7 C (98.1 F), Pulse: 95, Resp: 20, BP: 155/72, SpO2 96 % on   at flow rate  L/
min
 Temp  Min: 36.7 C (98.1 F)  Max: 36.7 C (98.1 F)
 
    
 
 PHYSICAL EXAMINATION: 
  
 
 Constitutional
 NAD, AOX3, bronzing of skin
 
 Eye
 PERRLA
 No current scleral icterus
 
 ENT
 Unremarkable oral ear and nose 
 
 Neck
 No adenopathy, thyromegaly nor masses
 
 Lymph node exam
 Negative in the following areas: neck and epitrochlear 
 
 Cardiac
 RRR, no murmurs rubs or gallups
 LE edema negative
 
 Lung
 CTA bilaterally
 Respiratory effort not labored on room air
 
 Abdomen
 + BS, distended with fluid wave, mild tenderness to deep palpation mid epigastric, areas of
 striae on left abdominal wall.
 
 Skin:
 Areas of petechiae vs rash on bilateral legs. No sign of briusing
 
 Psych
 Mood and affect normal
 Oriented to name, date (month, year) and place 
 
 Neuro
 Face symmetric, tongue midline 
  equal, no pronator drift 
  
 
 DIAGNOSTIC STUDIES: 
 
 Lab results last 24 hours
 Recent Results (from the past 24 hour(s)) 
 Troponin I 
  Collection Time: 20  6:15 PM 
 Result Value Ref Range 
  Troponin I 0.03 <0.06 ng/mL 
 TSH 
  Collection Time: 20  6:15 PM 
 
 Result Value Ref Range 
  TSH 4.43 0.55 - 4.78 uIU/mL 
 Lipid Panel 
  Collection Time: 20  6:15 PM 
 Result Value Ref Range 
  Triglycerides 115 <=150 mg/dL 
  Cholesterol 104 <=200 mg/dL 
  HDL 22 (L) 40 - 60 mg/dL 
  Chol/HDL Ratio 4.7  
  LDL, Calculated 59 <=130 mg/dL 
 ECG 12 lead 
  Collection Time: 20  6:20 PM 
 Result Value Ref Range 
  INTERPRETATION TEXT Not Confirmed  
 Hemoglobin A1C 
  Collection Time: 20  6:38 PM 
 Result Value Ref Range 
  Hemoglobin A1c 10.5 (H) 4.3 - 6.0 % 
  Estimated Average Glucose 255 mg/dL 
 
 Micro results (more choices using dotmicro)
 Microbiology Results (72 hrs)  
  ** No results found for the last 72 hours. ** 
  
 
 Radiology results (more choices using dotrisresults)
 No results found.
 
 I reviewed imaging 
 
 EKG Results (I reviewed EKG) 
  18:22pm EKG
 Some <1mm ST elevation V2, V3, no st depressions, no t wave inversions, ventricular paced r
hythm. QTc 517. No apparent changes from previous ekg at [facility]
 
 I reviewed and summarized old records
 
 ASSESSMENT: 
 
 Principal Problem: Elevated troponin
 
 Active Hospital Problems 
  Diagnosis 
   Elevated troponin 
   ACS (acute coronary syndrome)  
   Cirrhosis of liver with ascites  
   Diabetes mellitus type 2, insulin dependent  
   Essential hypertension 
  
 Resolved Hospital Problems 
 No resolved problems to display. 
 
 69M PMHx Cirrhosis 2/2 Hep C with ascites and varices history, history of AS s/p AVR and br
adycardia s/p pacemaker placement, IDDM, HTN, HLD, presents with weakness and elevated tropo
clay without chest pain, transferred for ACS work up.
 
 Code Status   
 Full code
 
 Medical Decision Maker   
 
 self
 
 DVT Prophylaxis   
 heparin
 
 PLAN: 
 
 #Elevated Troponin, cardiac rule out
 Patient was without chest pain, EKG showed <1mm st elevations V2-V3 unchanged on serial EKG
s, ventricular paced rhythm with history of pacemaker for bradycardia. No history of CAD and
 no history of known cardiac workup in past, has history of HTN, HLD, and DMII as risk facto
rs. Troponin 0.104 in [facility], repeat on arrival was 0.03 downtrending. No chest pain 
since admisison
 -Monitor telemetry
 -Lexiscan in am, NPO MN
 -trend trop x3
 -monitor for chest pain
 -TSH/A1c/Lipid panel
 -continue asprin/statin
 -f/u echo
 -In/Outs
 
 #Cirrhosis with ascites and history of varices
 #History of Hep C, untreated
 #weakness
 Patient has Hep C has not undergone treatment for this. Has cirrhosis history and history o
f varices. Has stated that he has never had a paracentesis in the past for his ascites. Is n
oted to have increased work of breathing prior to arrival and noted to have mid epigastric p
ain on deep palpation, Abdomen has significant distention and ascites
 -Eval with abd U/S. Plan for paracentesis in am with paracentesis labs ordered including ce
ll count/culture/LDH/glucose/Ph/albumin/protien/cytology
 -continue home lasix 10mg daily
 -Pt/OT, weakness is likely 2/2 cirrhosis
 
 #IDDM
 Continue home lantus 15U HS
 ISS #1
 Glucose achs
 
 #HTN
 Hold lisinopril at this time
 Monitor for blood pressure
 
 #HLD
 Continue statin
 
 #AS s/p AVR
 #Bradycardia s/p PM
 -monitor tele
 
 #thrombocytopenia
 Likely chronic from cirrhosis, continue to monitor
 
 DVT PPx: heparin
 GI PPx: protonix
 Diet: cardiac 
 
 Dispo: medical with tele, PT/OT ordered
 
 CMS Documentation   
 
 I expect this patient will be hospitalized for greater than 2-midnights and expect the post
-hospital plan to be determined once additional information is obtained.
 
 Electronically signed by: Trace Reyes MD    2020   8:06 PM
 [facility]
 
 Portions of this chart may have been created with Dragon voice recognition software. Occasi
onal wrong-word or   sound-alike  substitutions may have occurred due to the inherent garland
itations of voice recognition software. Please read the chart carefully and recognize, using
 context, where these substitutions have occurred
 
 Electronically signed by Trace Reyes MD at 2020  8:27 PM PSTdocumented in th
is encounter
 
 Miscellaneous Notes
 Plan of Care - Jag Perry RN - 02/10/2020  4:12 PM PSTPt denies pain this shift, pt mildred
es n/v tolerating diet well, pt denies SOB on ra. Pt cont to ask for bedpan w/ no bm's. Pt h
aving good urinary output. Pt was talked to about dc plans, pt expressed concerns about stay
ing at rehab/SNF facility, concerns acknowledged and reassurance provided to help ease pt tr
ansition. VSS, pt discharged to [facility]  this afternoon. Electronically signed by Jag Perry RN at 02/10/2020  4:20 PM PSTPlan of Care - Hipolito Hdez, FABIANO - 02/10/2020  2:16 PM
 PSTFormatting of this note might be different from the original.
 
 Physical Therapy Plan of Care
 Treatment Note
 
 Summary:   Zeus has been participating in physical therapy for treatment of impaired act
vitiy tolerance and functional mobility, weakness, imbalance, decreased activity tolerance, 
and deconditioning following hospitalization for weakness.  Pt found to have elevated tropon
ins.  
 
 Emphasis of session included bed mobility, lateral depression transfer training.  Patient d
emonstrates progress towards functional goals as evidenced by min diminished level of assist
 with supine>sit activities, pt betzy to scoot fwd/L/R with depression transfers requires 1+1
P ModA throughout session for safety, sequencing, positioning. Pt cont to present with signi
ficant generalized deconditioning/weakness, reduced upright activity tolerance and pt fatigu
es quickly with functional upright activities this session.  
 
 RN cleared patient for participation in therapy. Patient was agreeable to PT. Patient parti
cipated without adverse reaction. RN debriefed on PT session and patient status. It is encou
raged that the patient be up in chair for all meals.
 
 Recommended mobility with nursing:
  Day Night 
  bedside commode bedside commode 
  Wheelchair Wheelchair 
  moderate assistance with 2 people and verbal cues and tactile cues maximum assistance with
 2 people and verbal cues and tactile cues 
 
 Remaining barriers to discharge and functional limitations include decreased insight into s
afety and deficits, decreased functional activity tolerance, decreased bed mobility, decreas
ed functional transfers, and not yet able to mobilize at level safe for home discharge.
 
 Zeus will benefit from continued therapeutic intervention to address ongoing impairments
 and increase safety and independence with activities necessary for safe discharge.  Refer b
mckay for specific details regarding functional levels. 
 
 Physical Therapy Discharge Recommendations are:
 Recommended discharge disposition:  skilled nursing facility
 Post discharge physical therapy recommendation:  ongoing low intensity therapy, will benefi
 
t from structured setting
 
 Equipment Recommendations:  sliding board
 
 Planned Interventions:  balance training, bed mobility training, neuromuscular re-education
, patient/family education, postural re-education, transfer training, wheelchair management/
propulsion training
 Recommended Frequency: other (see comments)(5-7 x week)
 
 Patient Status/Goals:
  Reflects last filed data and may be from multiple contributors.
  Gait
 NT, pt is non ambulatory
 Level of Leon: not appropriate to assess, unable to perform
  
  
  
  
  
  
 
  
  
  
 
 Transfers
 Mod-MaxA 1+1P for lateral depression t/f from bed>w/c>recliner this session, pt requesting 
to not utilized slideboard, extra time/effort d/t LE/UE weakness, reduced upright activity t
olerance, generalized deconditioning, pt fatigue quickly with min functional activity
 Bed-Chair, Level of Leon: maximal assist (25% patient effort), set up required, tracey
bal cues required, 1 person + 1 person to manage equipment
 Chair-Bed, Level of Leon: not tested
 Bed-Chair-Bed, Assistive Device: wheelchair
 Sit-Stand, Level of Leon: maximal assist (25% patient effort), 2 person assist requ
ired, set up required, verbal cues required, tactile cues required
 Stand-Sit, Level of Leon: maximal assist (25% patient effort), 2 person assist requ
ired, set up required, verbal cues required, tactile cues required
 Sit-Stand-Sit, Assistive Device: wheelchair
 
  
  
  
  
  
  
  
  
 Safety Issues: balance decreased during turns
 Impairments: decreased flexibility, muscle tone abnormal, strength decreased, impaired hazel
nce, coordination impaired, motor control impaired, postural control impaired
 
 Bed Mobility
 extra time/effort d/t generalized deconditioning, LE weakness, reduced trunk control, ModA 
1+1P throughout supine>sit, SBA with BUE support for EoB sitting this session
 Assistive Device: HOB elevated, bed rails
  
  
 Scoot/Bridge, Level of Leon: maximal assist (25% patient effort), 2 person assist r
equired, tactile cues required, verbal cues required, set up required
 Supine to Sit, Level of Leon: moderate assist (50% patient effort), 1 person + 1 pe
 
rson to manage equipment, set up required, verbal cues required, tactile cues required
 Sit to Supine, Level of Leon: not tested
  
  
 Safety Issues: decreased use of arms for pushing/pulling, decreased use of legs for bridgin
g/pushing, impaired trunk control for bed mobility
 Impairments: decreased flexibility, muscle tone abnormal, strength decreased, impaired hazel
nce, coordination impaired, motor control impaired, postural control impaired
 
  
  
  
  
 
 Balance
  
 Sitting Balance: Static: fair balance
 Sitting Balance: Dynamic: fair balance
 Standing Balance: Static: unable to balance
 Standing Balance: Dynamic: unable to balance
 
  
  
  
  
  
  
  
  
  
  
  
  
  
  
  
  
  
  
 
 Functional Endurance
 fair- with min lateral transfer activities x2, secondary d/t generalized deconditioning, LE
/UE weakness 
 
  
 
 PT Goal Review Date  
 
   Most Recent Value 
 STG Review Date  20 at 2020 1434 
 
  
 
  
  
  
  
 Sidelying-Sit-Sidelying Goal  
 
   Most Recent Value 
 
 STG Status  progressing at 02/10/2020 1416 
 STG Leon Level  modified independent at 2020 1434 
 STG Assistive Device  HOB elevated, bed rails at 2020 1434 
 STG Comments  Pt has powered bed at home with elevating head and height. at 2020 1434
 
 
  
 
  
 Bed-Chair-Bed Goal  
 
   Most Recent Value 
 STG Status  progressing at 02/10/2020 1416 
 STG Leon Level  modified independent at 2020 1434 
 STG Assistive Device  sliding board, wheelchair at 2020 1434 
 
  
 
  
  
  
   
  
  
  
  
 Wheelchair Goal  
 
   Most Recent Value 
 STG Status  new at 2020 1434 
 STG  Pt will propel manual wc 50 feet with supervision to improve his safety and independen
ce at 2020 1434 
 
  
 
  
  
  
  
  
  
  
  
 
 PT Time Calculation
 PT Additional Treatment Time: Co-treatment
 PT Co-treatment Start Time: 1335
 PT Co-treatment Stop Time: 1415
 PT Co-treatment Total Time: 40
 Missed Treatment Time: 0
 PT Total Treatment Time: 40
 Timed [place] Code Minutes: 40
 
 Electronically signed by: Hipolito Hdez PTA, 2/10/2020 6:25 PMElectronically signed by Joss Hdez PTA at 02/10/2020  6:30 PM PSTPlan of Care - Yoli Hernandez OT - 02/10/2020 
 2:15 PM PSTFormatting of this note might be different from the original.
 Occupational Therapy Plan of Care
 Treatment Note
 
 Summary:   Zeus has been participating in occupational therapy for treatment of Decrease
 
d ADL independence, functional mobility, activity tolerance s/p admission for elevated tropo
clay and weakness.  Emphasis of session included co-treatment with PT d/t decreased activity 
tolerance and work on functional t/fs. Pt fatigued quickly with depression transfers from EO
B>w/c>chair overall needing increased assist for each t/f.  Patient demonstrates progress to
wards functional goals as evidenced by progressing with 2 t/fs this session. Pt. Will need S
NF for rehabilitation prior to home.
 
 Remaining barriers to discharge and functional limitations include decreased functional act
ivity tolerance, decreased functional transfers, decreased ability to perform ADLs, decrease
d ability to perform IADLs, demonstrating need for 24/7 supervision, and medical status.
 
 Zeus will benefit from continued therapeutic intervention to address ongoing impairments
 and increase safety and independence with activities necessary for safe discharge.  Refer jose guadalupe bashir for specific details regarding functional levels.
 
 Occupational Therapy Discharge Recommendations are:
 Recommended discharge disposition:  skilled nursing facility
 Post discharge occupational therapy recommendation:  pt is motivated participant, ongoing l
ow intensity therapy, home health
 
 Equipment Recommendations:  reacher, toilet safety frame, sock aide, gait belt, grab bars, 
shower chair
 
 Planned Interventions:ADL retraining, balance training, bed mobility training, functional e
ndurance training, strengthening, transfer training
 Recommended Frequency: (3-5x's)
 
 Patient Status/Goals:
  Reflects last filed data and may be from multiple contributors.
 ADLs 
   
 
  
  
  
  
  
 
  
  
  
  
  
 
 LB dressing in bed this session as pt reports he perform with mod I at bed level at home. korin mendez requiring max assist at this time for threading BLEs into pants and donning<>dofffing
 over hips. 
 LB Dressing, Level of Leon: maximal assist (25% patient effort), 2 person assist re
quired, set up required
 Assistive Device: none
 LB Dressing Assess/Train, Position: sitting, supported standing
 LB Dressing Impairments: decreased flexibility, ROM decreased, impaired functional enduranc
e/activity tolerance, pain, strength decreased
 
 set-up assist. light sponge cap bath for hair. min assist for thoroughness.
 Grooming, Level of Leon: minimal assist (75% patient effort)
 Assistive Device: none
 Grooming Assess/Train, Position: sitting
 Grooming Impairments: impaired functional endurance/activity tolerance, decreased flexibili
ty, ROM decreased, pain, impaired balance, strength decreased
 
 
  
   
  
  
  
  
  
  
  
  
  
  
 Cognitive
  
 Mood/Behavior: calm, cooperative
 Orientation: oriented x 4
  
  
  
  
   
 
 Bed Mobility
 Mod 1+1p for assist out of bed. heavy use of bed features and mod A d/t weakness, deconditi
oning. SBA for EOB static sitting.
 Assistive Device: HOB elevated, bed rails
  
  
 Scoot/Bridge, Level of Leon: maximal assist (25% patient effort), 2 person assist r
equired, tactile cues required
 Supine to Sit, Level of Leon: moderate assist (50% patient effort), 1 person + 1 pe
rson to manage equipment, set up required, verbal cues required, tactile cues required
 Sit to Supine, Level of Leon: not tested
  
  
 Safety Issues: decreased use of arms for pushing/pulling, decreased use of legs for bridgin
g/pushing, impaired trunk control for bed mobility
 Impairments: decreased flexibility, muscle tone abnormal, strength decreased, impaired hazel
nce, coordination impaired, motor control impaired, postural control impaired
 
 Transfers
 1+1p Mod-max assist for depression t/f from EOB>wc>chair without use of slide board per pt 
request. increased time/effort and exertion with functiona t/f.s fatigued quickly needing in
creased assist with each t/f.
 Bed-Chair, Level of Leon: maximal assist (25% patient effort), set up required, tracey
bal cues required, 1 person + 1 person to manage equipment
 Chair-Bed, Level of Leon: not tested
  
 Sit-Stand, Level of Leon: maximal assist (25% patient effort), 2 person assist requ
ired, set up required, verbal cues required, tactile cues required
 Stand-Sit, Level of Leon: maximal assist (25% patient effort), 2 person assist requ
ired, set up required, verbal cues required, tactile cues required
  
  
  
  
  
  
  
 
  
  
  
  
  
 Safety Issues: balance decreased during turns
 Impairments: decreased flexibility, muscle tone abnormal, strength decreased, impaired hazel
nce, coordination impaired, motor control impaired, postural control impaired
 
  
   
  
  
  
 
 ROM
  
 ROM Testing Results: no range of motion deficits identified
  
  
   
  
  
  
  
 
 Strength
 generalized weakness
  
  
  
   
  
  
  
  
  
  
  
  
 
 OT Goal Review Date  
 
   Most Recent Value 
 STG Review Date  20 at 2020 1412 
 
  
 
  
 Grooming Goal  
 
   Most Recent Value 
 STG Status  progressing at 02/10/2020 1415 
 STG Leon Level  set up required at 2020 1412 
 STG Position  sitting in chair at 2020 1412 
 
  
 
  
  
 
 LB Dressing Goal  
 
   Most Recent Value 
 STG Status  progressing at 02/10/2020 1415 
 STG Leon Level  stand by assist, set up required at 2020 1412 
 STG Adaptive Equipment  -- [AE PRN] at 2020 1412 
 
  
 
  
 Toilet Transfer Goal  
 
   Most Recent Value 
 STG Status  progressing at 02/10/2020 1415 
 STG Leon Level  contact guard assist at 2020 1412 
 STG Assistive Device  grab bars, sliding board, commode (3 in 1) at 2020 1412 
 
  
 
  
  
   
  
 
 OT Time Calculation
 OT Additional Treatment Time: Co-treatment
 OT Co-treatment Start Time: 1335
 OT Co-treatment Stop Time: 1415
 OT Co-treatment Total Time: 40
 OT Total Treatment Time: 40
 Timed [place] Code Minutes: 15
 
 Electronically signed by: Yoli Hernandez OT, 2/10/2020 3:42 PM
 Electronically signed by Yoli Hernandez OT at 02/10/2020  3:47 PM PSTSNF Transfer - Belkis rdz, Janet Madsen MD - 02/10/2020  1:55 PM PSTFormatting of this note might be different fro
m the original.
 SKILLED NURSING FACILITY TRANSFER ORDERS
 
 Patient Name:  Zeus Huang
 Patient MRN: [id number]
 : 1950   Gender: male
 
 Date of Admission:  2020       
 Date of Discharge:   2/10/2020
 
 Admitting Provider:  Trace Reyes*                                   
 Discharging Provider:  Janet Sosa MD
 Consultants:   None
  
  PCP:     Omega Yu
 
 [facility] transferring to: [facility]
 Provider after transfer: House MD
 
 CODE STATUS:
  [x] Attempt CPR  [] Do not resuscitate  
                  If patient is pulseless and not breathing, RN/LPN may pronounce death.
 
  Advanced Directives included:
  [] POLST  [] MOLST/MOST  [] Comfort One ([place])      [] Other: 
 
 
 Code status discussed with:
  [x] Patient  [] Spouse/Family  [] DPOA                [] Other:
  Name of person discussed with: 
  Date discussed: 
 
 Isolation/Infection Precautions:
 No active isolations 
 
 No active infections 
 
 Height:
 Height: 165.1 cm (5' 5") 
 
 Wt Readings from Last 3 Encounters: 
 02/10/20 76 kg (167 lb 8.8 oz) 
 
 Admitting Diagnosis:
 
 Elevated Troponin 
 
 Patient Active Problem List 
 Diagnosis 
   Elevated troponin 
   Cirrhosis of liver with ascites  
   Diabetes mellitus type 2, insulin dependent  
   Essential hypertension 
   Physical deconditioning 
   
 
 No Known Allergies
 
 There is no immunization history on file for this patient.
 
 Diet: 
  [] As tolerated SLP may upgrade or downgrade diet as condition Indicates.
  [] RN may downgrade diet as indicated.
 
 Type: 
  [x] Continue current diet of: 
 Diet and Supplements 
 Diet 
  Diet consistent carb; sodium restricted 2 gm; fat and cholesterol modified; Effective Now 
   Number of Occurrences: Until Specified 
   Order Questions: 
     Type Diet consistent carb 
     Sodium restictions sodium restricted 2 gm 
     Fat or cholesterol modifications fat and cholesterol modified 
 
  [] Other: 
 
  Consistency/Precautions:
  [] Whole    [] Thin Liquids
  [] Cut-up    [] Nectar Thick 
  [] Advanced Chopped   [] Honey Thickened
  [] Chopped    
  [] Advanced Ground   [] 1:1 feedings
  [] Ground/Pureed   [] Other: 
 
 Tube Feedings:
 
  [] PEG  [] GT  [] JT  [] NGT
  [] Formula type: __________  (Dietician may change/substitute if indicated).
  [] Continuous Rate: __________ ml/hr, infusing _____ hrs/day
  [] Bolus feeds: __________ ml every _____ hours
  [] Additional water: __________ ml every _____ hours
 
 Respiratory:
  [] BiPAP at night & PRN SOB.  Settings:__________ O2 _____ L bleed 
   Dx: __________
  [] CPAP at night & PRN SOB.  Settings: __________ O2 _____ L bleed
   Dx: __________
  [] Suction & Pulmonary toilet PRN secretion/sputum management. 
   Dx: __________
  [] Incentive Spirometer QID and PRN while awake.  Duration: __________  
   Dx: __________
  [] Tracheostomy management per protocol 
  [] Oxygen: __________ Lpm NC/Trach 
   [] Continuous  [] NOC   [] Humidified 
   [] prn SaO2 < _____ % [] prn SOB/dyspnea 
   Dx: __________
  [] Other: 
   Dx: __________
 Bladder:
  [] Follow nursing protocol for recent dyer removal
  [] Dyer catheter managment per nursing protocol - Indication: __________     
   [] Permanent
   [] Temporary
  [] Remove dyer catheter on __________ and follow nursing protocol for recent dyer remova
l.
  [] Straight catheter every _____ hour(s) and record amount drain 
   Dx: __________
  [] Bladder scan every _____ hour(s) and straight cath for > _____ ml 
   Dx: __________
  [] Suprapubic catheter management 
   Dx: __________
 
 Other Lines, Tubes and Drains: (to be managed by nursing protocol)
  [] IV access and location: __________
   [] Permanent  
   [] Temporary: Instructions/indications for removal of IV access: __________
  [] May use Alteplase per protocol PRN occluded central venous catheter 
  [] Colostomy    [] Ileostomy     [] Urostomy           [] Nephrostomy
  [] Dialysis Access - Type & Location: _________ 
  [] Drains - Type & Location: __________
  [] Other: 
    
 Activity/Therapies: 
  []WBAT [] Weight Bearing Restricted (specify limb(s)): __________
 
  [x] PT Evaluation & Management for: __________
  [x] OT Evaluation & Management for: __________
  [] SLP Evaluation &Management for: __________
  [] Other: 
 
 Wound/Skin Care:
  [] Follow current recommendations of the wound team for treatment.
  [] Follow standard nursing protocols for wound care. 
  [] Wound Vac management per nursing protocol. Indication: __________ Location: __________
   Settings: __________ Change frequency: __________ & prn
  [] Other: 
 
 
 Labs/Imaging: 
  [] PT/INR: Frequency: __________ Dx: __________ Goal INR: __________ 
   Duration of therapy: __________
  [] Fingerstick glucose checks: __________ 
   Dx: DM
  [] Other:
 Test/Study Needed/Frequency Diagnosis/Indication 
    
    
    
    
 
 Follow up appointments and consultations:
 
  _________________________________Date/Time: _________________________
 
 _________________________________ Date/Time__________________________
 
 I have advised this patient that he/she not use tobacco products.
 
 TB screening: Upon admission the 1st and 2nd step TST will be done as per protocol if Resid
ent has no history of TB or a past positive TST.
 
 Pharmacist may substitute equivalent Rx based on facility or insurance formulary as needed 
unless otherwise specified by physician.  Please write "BERNABE" (Dispense as written) if a medi
cation should not be substituted.
 
 Please make sure to write a diagnosis for ALL medications continued on transfer.  Antibioti
cs require a stop date.  If medications do not contain a SIG, make sure doses/routes and laura
edule is included.
 
  
 Medication Orders 
  
 New Medications  
   Details Order Next Dose Due 
 dicyclomine 10 mg capsule
  Take 2 capsules by mouth 4 times daily as needed for GI Upset.
 aka:  BENTYL
  By:  Janet Sosa MD
 Quant:  240 capsule
   
 loperamide 2 mg capsule
  Take 1 capsule by mouth every 3 hours as needed for Diarrhea.
 aka:  IMODIUM
  By:  Janet Sosa MD
   
 spironolactone 100 MG tablet
  Take 1 tablet by mouth Daily.
 aka:  ALDACTONE
  By:  Janet Sosa MD
   
  
 Changed Medications  
   Details Order Next Dose Due 
 furosemide 40 mg tablet
  Take 1 tablet by mouth Daily.
 What changed:  
  medication strength
 
  how much to take
 aka:  LASIX
  By:  Janet Sosa MD
   
 insulin aspart 100 units/mL injection
  Inject 5 Units under the skin 3 times daily (before meals).
 What changed:  how much to take
 aka:  novoLOG
  By:  Janet Sosa MD
   
  
 Unchanged Medications  
   Details Order Next Dose Due 
 aspirin 81 mg EC tablet
  Take 81 mg by mouth Daily.
    
 atorvaSTATin 40 mg tablet
  Take 40 mg by mouth nightly.
 aka:  LIPITOR
    
 DULoxetine 30 mg DR capsule
  Take 30 mg by mouth Daily.
 aka:  CYMBALTA
    
 folic acid 1 mg tablet
  Take 1 mg by mouth Daily.
    
 hydrOXYzine hydrochloride 25 mg tablet
  Take 25 mg by mouth every 8 hours as needed for Anxiety.
 aka:  ATARAX
    
 insulin glargine 100 units/mL injection (vial)
  Inject 15 Units under the skin nightly.
 aka:  LANTUS
    
 lisinopril 5 mg tablet
  Take 5 mg by mouth Daily.
 aka:  PRINIVIL, ZESTRIL
    
 magnesium oxide 400 mg tablet
  Take 400 mg by mouth Daily.
 aka:  MAG-OX
    
 nicotine 14 mg/24 hr
  Place 1 patch onto the skin every 24 hours.
 aka:  NICODERM
    
 pantoprazole 40 mg tablet
  Take 40 mg by mouth every morning (before breakfast).
 aka:  PROTONIX
    
 sucralfate 1 g tablet
  Take 1 g by mouth 4 times daily.
 aka:  CARAFATE
    
 tamsulosin 0.4 mg Caps
  Take 0.8 mg by mouth daily (after breakfast).
 aka:  FLOMAX
    
 traMADol 50 mg tablet
 
  Take 1 tablet by mouth every 6 hours as needed for Pain.
 aka:  ULTRAM
  By:  Janet Sosa MD
 Quant:  20 tablet
   
 traZODone 100 mg tablet
  Take 100 mg by mouth nightly.
 aka:  Janet GRUBBS MD, certify that post hospital skilled nursing care is medically nece
ssary on a continuing basis for any of the conditions for which he/she received care during 
this hospitalization. 
 
  Check one:  [x] Skilled  [] Intermediate
 
 Additional Orders/Instructions: 
 
 Physician's signature:_______Janet Sosa MD_______________________________2/10/2020 
1:55 PM
 
 [facility]
 -------------------------------------------------------------------------------------------
-----------------------------------------------------
 
 NURSING FACILITY USE ONLY:
 
 [] Admitting orders verbally reviewed with Admitting Physician, modified where appropriate,
 and approved.
 
 Verbal Order from  __________________________________  Date: __________  Time: _________
_
 
 RN name: ____________________________  RN signature: _________________________________
 
 [] Admitting orders reviewed, modified where appropriate, and approved.
 
 Physician's signature:___________________________________  Date:__________Time:__________
 
 Electronically signed by Janet Sosa MD at 02/10/2020  2:05 [facility] - 02/10/2020 11:36 AM PST
 Problem: Discharge Planning
 Goal: Patient's discharge needs will be identified in a timely manner
 Outcome: Ongoing, progressing
 Goal: Patient will be discharged in a safe manner
 Outcome: Ongoing, progressing
 
 This CM called  [facility] and spoke with Ana Laura. He already has several referrals and states th
at because he is from [place], she would recommend looking at [facility] first. This CM c
alled [facility] and left a message with William. Will await her call back. If she cannot ac
cept, will look to see if [facility] can accept.
 
 This CM will speak further with Zeus once we know what [facility] can accept. Electronically s
igned by: CHET Rodriguez 2/10/2020 11:38 AM 
 
 Spoke with William at [facility]. William states that they can accept Cesar and will acce
pt him today.
 
 
 This CM spoke with Cesar about this. He is agreeable to go to [facility] but wants to empha
size that it is only for rehab and he does not want to live in a nursing home. His goal is t
o get him with care givers and home health. This CM relayed this to William.
 
 Patient accepted and selected in Epic, packet completed.
 
 Awaiting a call back from [facility] transport for a wheelchair transport, will try to ar
range transport for 1500 today.
 
 PLAN: To [facility] at 1500. Electronically signed by: CHET Rodriguez 2/10
/2020 1:02 PM 
 
 Received a call back from [facility] transport, transportation solutions can come at 1615
. Notified patient and staff. Electronically signed by: CHET Rodriguez 2/10/2020 3:2
9 PM 
 
 1600-This CM received a call from Maty, CRT worker who states that [facility] crisis worker 
called her wanting her to evaluate patient as he made a statement such as "if I am moved to 
rehab, I will voluntarily discharge myself then kill myself." Maty was not sure when or how
 he doUdeal got this information. The patient was already leaving the hospital, heading to 
[facility] so this CM informed her of this. This CM encouraged [facility] him at [facility] for a evaluate. She s
aid she would follow up with this. Electronically signed by: CHET Rodriguez 2/10/202
0 4:22 PMElectronically signed by CHET Gallardo at 02/10/2020  4:22 PM PSTPlan of Justin Elise RN - 02/10/2020  5:36 AM PSTMike is a/o x4 and remains free from fall
/injury this shift. C/O BLE nerve pain,tramadol given x2 with relief. Small BM this morning.
  last night. VSS.Electronically signed by Justin Schneider RN at 02/10/2020  5:40 AM 
PSTPlan of Jag Uriarte RN - 2020  6:53 PM PSTPt doing well today, denies pain
, denies n/v tolerating diet well. Pt denies SOB. Mltple requests to get on bedpan today, no
 bm's noted. VSSElectronically signed by Jag Perry RN at 2020  6:54 PM PSTPlan of 
are - Jag Perry RN - 2020  7:03 PM PSTPt doing well today, denies pain, denies n/v
 tolerating diet well. Pt denies SOB. Pt mod assist w/ transfers and sat in chair this after
noon.  VSS.Electronically signed by Jag Perry RN at 2020  7:04 PM PSTPlan of Charmaine Parson - 2020  3:54 PM PSTFaxed [facility] [place].
 Electronically signed by: Charmaine Zheng 2020 3:54 PM
 Electronically signed by Charmaine Zheng at 2020  3:55 PM PSTPlan of Dannie Hitchcock PT - 2020  2:41 PM PSTFormatting of this note might be different from the [facility].
 
 Physical Therapy Plan of Care
 Treatment Note
 
 Summary:   Zeus has been participating in physical therapy for treatment of impaired act
vitiy tolerance and functional mobility, weakness, imbalance, decreased activity tolerance, 
and deconditioning following hospitalization for weakness.  Pt found to have elevated tropon
ins..  Emphasis of session included bed to chair transfers, trial heel , sitting bal
ance and tolerance.  Patient demonstrates progress towards functional goals as evidenced by 
Needed less assist with bed mobility.  
 
 RN cleared patient for participation in therapy. Patient was agreeable to PT. Patient parti
cipated without adverse reaction. RN debriefed on PT session and patient status. It is encou
raged that the patient be up in chair for all meals.
 
 Recommended mobility with nursing:
  Day Night 
  bedside commode bedside commode 
  Overhead Lift Overhead Lift 
  maximum assistance with 2 people maximum assistance with 2 people 
 
 Remaining barriers to discharge and functional limitations include decreased insight into s
 
afety and deficits, decreased functional activity tolerance, decreased bed mobility, decreas
ed functional transfers, and not yet able to mobilize at level safe for home discharge.
 
 Zeus will benefit from continued therapeutic intervention to address ongoing impairments
 and increase safety and independence with activities necessary for safe discharge.  Refer jose guadalupe bashir for specific details regarding functional levels. 
 
 Physical Therapy Discharge Recommendations are:
 Recommended discharge disposition:  skilled nursing facility
 Post discharge physical therapy recommendation:  ongoing low intensity therapy, will benefi
t from structured setting
 
 Equipment Recommendations:  sliding board
 
 Planned Interventions:  balance training, bed mobility training, neuromuscular re-education
, patient/family education, postural re-education, transfer training, wheelchair management/
propulsion training
 Recommended Frequency: other (see comments)(5-7 x week)
 
 Patient Status/Goals:
  Reflects last filed data and may be from multiple contributors.
  Gait
 NT, patient is non-ambulatory
  
  
  
  
  
  
  
  
  
 
 Transfers
 2P Max A for LE management and weakness.  Trialed left heel unlaoder to protect left heel w
ound 
 Bed-Chair, Level of Leon: maximal assist (25% patient effort), 2 person assist requ
ired, set up required, verbal cues required
 Chair-Bed, Level of Leon: not tested
  
  
  
  
 Toilet, Level of Leon: maximal assist (25% patient effort), 2 person assist require
d, set up required, verbal cues required, tactile cues required
 Toilet, Assistive Device: commode (3 in 1)
  
  
  
  
  
  
  
  
  
 Safety Issues: balance decreased during turns
 Impairments: decreased flexibility, muscle tone abnormal, strength decreased, impaired hazel
nce, coordination impaired, motor control impaired, postural control impaired
 
 Bed Mobility
 
 1P+1P for safety, Mod A for trunk and LE management due to weakness, deconditioning.  able 
to sit EOB using arms for support.
 Assistive Device: HOB elevated, bed rails
  
  
 Scoot/Bridge, Level of Leon: maximal assist (25% patient effort), 2 person assist r
equired, tactile cues required
 Supine to Sit, Level of Leon: moderate assist (50% patient effort), 1 person + 1 pe
rson to manage equipment, set up required, verbal cues required, tactile cues required
 Sit to Supine, Level of Leon: moderate assist (50% patient effort), 1 person + 1 pe
rson to manage equipment, set up required, verbal cues required, tactile cues required
  
  
 Safety Issues: decreased use of arms for pushing/pulling, decreased use of legs for bridgin
g/pushing, impaired trunk control for bed mobility
 Impairments: decreased flexibility, muscle tone abnormal, strength decreased, impaired hazel
nce, coordination impaired, motor control impaired, postural control impaired
 
  
  
  
  
  
  
  
 
 Balance
  
 Sitting Balance: Static: fair balance
 Sitting Balance: Dynamic: fair balance
 Standing Balance: Static: unable to balance
  
 
 Therapeutic Exercise
  
 Bed exercises: bilateral, ankle pumps, quad sets, short arc quads, heel slides
  
  
  
  
  
  
  
  
  
  
  
  
  
  
  
  
  
  
  
  
  
  
  
  
 
 
 Functional Endurance
 Fair  
 
 ROM
  
 ROM Testing Results: no range of motion deficits identified
  
  
  
  
   
  
  
  
  
  
  
  
  
  
 
 Strength
 generalized weakness
  
  
  
  
  
  
  
  
  
  
  
  
  
  
  
  
  
  
  
 
 PT Goal Review Date  
 
   Most Recent Value 
 STG Review Date  20 at 2020 1434 
 
  
 
  
  
  
  
 Sidelying-Sit-Sidelying Goal  
 
   Most Recent Value 
 STG Status  progressing at 2020 1441 
 STG Leon Level  modified independent at 2020 1434 
 
 STG Assistive Device  HOB elevated, bed rails at 2020 1434 
 STG Comments  Pt has powered bed at home with elevating head and height. at 2020 1434
 
 
  
 
  
 Bed-Chair-Bed Goal  
 
   Most Recent Value 
 STG Status  progressing at 2020 1441 
 STG Leon Level  modified independent at 2020 1434 
 STG Assistive Device  sliding board, wheelchair at 2020 1434 
 
  
 
  
  
  
   
  
  
  
  
 Wheelchair Goal  
 
   Most Recent Value 
 STG Status  new at 2020 1434 
 STG  Pt will propel manual wc 50 feet with supervision to improve his safety and independen
ce at 2020 1434 
 
  
 
  
  
  
  
  
  
  
  
 
 PT Time Calculation
 PT Additional Treatment Time: Co-treatment
 PT Co-treatment Start Time: 1400
 PT Co-treatment Stop Time: 1440
 PT Co-treatment Total Time: 40
 Missed Treatment Time: 0
 PT Total Treatment Time: 40
 Timed [place] Code Minutes: 40
 
 Electronically signed by: Ric Coppola, PT, 2020 6:57 PMElectronically signed by JANA Coppola PT at 2020  6:59 PM PSTPlan of Care - Yoli Hernandez, OT - 2020  2:40 PM PSTFormatting of this note might be different from the original.
 Occupational Therapy Plan of Care
 Treatment Note
 
 Summary:   Zeus has been participating in occupational therapy for treatment of Decrease
d ADL independence, functional mobility, activity tolerance s/p admission for elevated tropo
clay and weakness.  Emphasis of session included co-treatment with PT d/t generalized weaknes
 
s, and increased assistance for safety with t/fs.  Patient demonstrates progress towards fun
ctional goals as evidenced by progressing with t/f to chair and working on LB dressing tasks
.
 
 Remaining barriers to discharge and functional limitations include decreased functional act
ivity tolerance, decreased bed mobility, decreased functional transfers, decreased ability t
o perform ADLs, decreased ability to perform IADLs, lives alone, demonstrating need for 24/7
 supervision, not yet able to mobilize at level safe for home discharge, and medical status.
 
 Zeus will benefit from continued therapeutic intervention to address ongoing impairments
 and increase safety and independence with activities necessary for safe discharge.  Refer jose guadalupe bashir for specific details regarding functional levels.
 
 Occupational Therapy Discharge Recommendations are:
 Recommended discharge disposition:  skilled nursing facility
 Post discharge occupational therapy recommendation:  pt is motivated participant, ongoing l
ow intensity therapy, home health
 
 Equipment Recommendations:  reacher, toilet safety frame, sock aide, gait belt, grab bars, 
shower chair
 
 Planned Interventions:ADL retraining, balance training, bed mobility training, functional e
ndurance training, strengthening, transfer training
 Recommended Frequency: (3-5x's)
 
 Patient Status/Goals:
  Reflects last filed data and may be from multiple contributors.
 ADLs 
   
 max assist for LB dressing and undergarments.
 LB Dressing, Level of Leon: maximal assist (25% patient effort)
 Assistive Device: none
 LB Dressing Assess/Train, Position: sitting, supported standing
 LB Dressing Impairments: decreased flexibility, ROM decreased, impaired functional enduranc
e/activity tolerance, pain, strength decreased
 
 set-up assist. light sponge cap bath for hair. min assist for thoroughness.
 Grooming, Level of Leon: minimal assist (75% patient effort)
 Assistive Device: none
 Grooming Assess/Train, Position: sitting
 Grooming Impairments: impaired functional endurance/activity tolerance, decreased flexibili
ty, ROM decreased, pain, impaired balance, strength decreased
 
    
 
 Bed Mobility
 Mod 1+1p for assist out of bed. heavy use of bed features and mod A d/t weakness, deconditi
oning. SBA for EOB static sitting.
 Assistive Device: HOB elevated, bed rails
  
  
 Scoot/Bridge, Level of Leon: maximal assist (25% patient effort), 2 person assist r
equired, tactile cues required
 Supine to Sit, Level of Leon: moderate assist (50% patient effort), 1 person + 1 pe
rson to manage equipment, set up required, verbal cues required, tactile cues required
 Sit to Supine, Level of Leon: not tested
  
  
 Safety Issues: decreased use of arms for pushing/pulling, decreased use of legs for bridgin
g/pushing, impaired trunk control for bed mobility
 
 Impairments: decreased flexibility, muscle tone abnormal, strength decreased, impaired hazel
nce, coordination impaired, motor control impaired, postural control impaired
 
 Transfers
 2p max squat pivot from EOB to chair d/t weakness in BLEs, deconditioning. increased time/e
ffort and cueing for hand placement and sequencing.
 Bed-Chair, Level of Leon: maximal assist (25% patient effort), 2 person assist requ
ired, set up required, verbal cues required
 Chair-Bed, Level of Leon: not tested
  
 Sit-Stand, Level of Leon: maximal assist (25% patient effort), 2 person assist requ
ired, set up required, verbal cues required, tactile cues required
 Stand-Sit, Level of Leon: maximal assist (25% patient effort), 2 person assist requ
ired, set up required, verbal cues required, tactile cues required
  
   
 Safety Issues: balance decreased during turns
 Impairments: decreased flexibility, muscle tone abnormal, strength decreased, impaired hazel
nce, coordination impaired, motor control impaired, postural control impaired
 
 ROM
  
 ROM Testing Results: no range of motion deficits identified
   
 
 Strength
 generalized weakness
  
  
  
 OT Goal Review Date  
 
   Most Recent Value 
 STG Review Date  20 at 2020 1412 
 
  
 
  
 Grooming Goal  
 
   Most Recent Value 
 STG Status  progressing at 2020 1440 
 STG Leon Level  set up required at 2020 1412 
 STG Position  sitting in chair at 2020 1412 
 
  
 
  
  
 LB Dressing Goal  
 
   Most Recent Value 
 STG Status  progressing at 2020 1440 
 STG Leon Level  stand by assist, set up required at 2020 1412 
 STG Adaptive Equipment  -- [AE PRN] at 2020 1412 
 
  
 
  
 Toilet Transfer Goal  
 
 
   Most Recent Value 
 STG Status  progressing at 2020 1440 
 STG Leon Level  contact guard assist at 2020 1412 
 STG Assistive Device  grab bars, sliding board, commode (3 in 1) at 2020 1412 
 
  
 
  
  
 
 OT Time Calculation
 OT Additional Treatment Time: Co-treatment
 OT Co-treatment Start Time: 
 OT Co-treatment Stop Time: 1440
 OT Co-treatment Total Time: 25
 OT Total Treatment Time: 25
 Timed [place] Code Minutes: 13
 
 Electronically signed by: Yoli Hernandez OT, 2020 4:18 PM
 Electronically signed by Yoli Hernandez OT at 2020  4:24 PM PSTPlan of Care - Jag Moreira RN - 2020  7:01 PM PSTPt has been c/o mod abd pain, prn Tramadol given with 
good relief noted. Pt denies SOB, c/o mild nausea this morning with good relief noted w/ prn
 Zofran. Pt continues to have increased BM's, prn Lomotil given w/ fair relief noted. VSS. E
lectronically signed by Jag Perry RN at 2020  7:04 PM PSTPlan of Care - Ray Steevn OT - 2020  3:42 PM PSTFormatting of this note might be different from the orig
inal.
 Patient Identification
 Zeus Huang is a 69 y.o. male.
 :  1950
 Admit Date:  2020
 Attending Provider:  Trace Reyes MD                                
 
 WOUND CARE CONSULT
 
 Subjective: 
  
 Reason for Consultation: management recommendations. 
 
 History of present illness: 
 
 Objective: 
 
 Pt presents with wounds on both feet. Photos taken and are in the Media tab of this EMR. 
 On the left heel, there is a large, 5.0cm x 4.7cm deep tissue injury on the lateral heel.  
It is soft, edges are well marked, ruel- wound is blanchable.
 
 On the tip of the right great toe, there is an unstageable wound.  It measures 1.3cm x 1.0c
m.  Hard, minimal blanching on the ruel-wound.  There is another small, 0.2 x 0.3cm black wo
und on the 5th digit.  
 Both were present on admission
 
 Assessment: 
 
 Deep tissue injury on the left heel, diabetic vs. Pressure ulcer on the tip of the right gr
eat toe and lateral small toe.  
 
 Plan: 
  Continue to use heel foam wedges.  Physical therapy to provide an off loading boot to allo
w pt to sit up in chair.  No dressings needed. Protect areas from harm.Electronically signed
 
 by Mari Steven OT at 2020  3:52 PM PSTPlan of Care - Ric Coppola, PT - 0
2020  2:48 PM PSTFormatting of this note might be different from the original.
 
 Physical Therapy Plan of Care
 Initial Evaluation, Treatment Note
 
 Summary:   Zeus presents to physical therapy with impaired actvitiy tolerance and functi
onal mobility, weakness, imbalance, decreased activity tolerance, and deconditioning followi
 hospitalization for weakness.  Pt found to have elevated troponins..  Objective exam reve
als impairments with aerobic capacity/endurance, anthropometric characteristics, arousal, at
tention, and cognition, ergonomics and body mechanics, functional endurance/activity toleran
ce, gait, locomotion, and balance, motor function, muscle performance, posture, reflex integ
rity. Emphasis of session to establish current functional mobility, initiate and progress be
d mobility, transfer training, assess balance and activity tolerance, education on transfers
 and home safety.
   
 
 RN cleared patient for participation in therapy. Patient was agreeable to PT. Patient parti
cipated without adverse reaction.
 
 Recommended mobility with nursing:
  Day Night 
  bed pan and bedside commode bed pan 
  Overhead Lift Overhead Lift 
  maximum assistance with 2 people maximum assistance with 2 people 
 
 Barriers to discharge and functional limitations include decreased insight into safety and 
deficits, decreased functional activity tolerance, decreased bed mobility, decreased functio
nal transfers, lives alone, and not yet able to mobilize at level safe for home discharge.
  
 Zeus will benefit from therapeutic intervention to address impairments and increase safe
ty and independence with activities necessary for safe discharge.  Refer below for specific 
details regarding functional levels. 
 
 Precautions/Limitations: falls
 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
  
  
  
  
 
 Previous Level of Function: 
 Transferring: (reports lateral slide transfer from power elevated bed to ma)
 Ambulation: independent
 Toileting: independent
 Bathing: assistive person
 Dressing: independent
 Eating: independent
 Communication: understands/communicates without difficulty
 Swallowin-->swallows foods/liquids without difficulty
 Equipment Currently Used at Home: wheelchair
 Prior Functional Level Comment: Pt reports using manual WC for mobility, lives alone, has c
aregiver 4 hrs/day, ramp to enter home, needs assist with bathing.
 
 Potential available assistance at discharge:
 Significant Relationships: other (see comments)(ex wife)
 Parent Marital Status: 
 Provides Primary Care For: no one, unable/limited ability to care for self
 
 Living Environment/Accessibility: 
 
 Lives With: alone
 Living Arrangements: house
 Home Accessibility: ramps present at home
 Financial Concerns: none
 Transportation Available: ambulance
 Living Environment Comment: pt reports using manual WC for mobility, lives alone, has ramp,
 caregivers 4 hours /day, dresses self but needs help with showering
 
 Patient/Family  s Goals: wants to return home independent
 
 Rehabilitation potential:  good, to achieve stated therapy goals
 
 Physical Therapy Discharge Recommendations are:
 Recommended discharge disposition:  skilled nursing facility
 Post discharge physical therapy recommendation:  ongoing low intensity therapy, will benefi
t from structured setting
 
 Equipment Recommendations:  sliding board
 
 Planned Interventions:  balance training, bed mobility training, neuromuscular re-education
, patient/family education, postural re-education, transfer training, wheelchair management/
propulsion training
 Recommended Frequency: other (see comments)(5-7 x week)
 
 Patient Status/Goals:
  Reflects last filed data and may be from multiple contributors.
  Gait
 NT, patient is non-ambulatory
  
  
   
  
  
  
  
  
  
  
 
 Transfers
 2P Max A squat pivot bed<>commode due to weakness, deconditioning, impaired coordination.  
VC and TC for technique.
  
  
  
  
  
  
 Toilet, Level of Leon: maximal assist (25% patient effort), 2 person assist require
d, set up required, verbal cues required, tactile cues required
 Toilet, Assistive Device: commode (3 in 1)
  
  
  
  
  
  
  
  
  
 
 Safety Issues: balance decreased during turns
 Impairments: decreased flexibility, muscle tone abnormal, strength decreased, impaired hazel
nce, coordination impaired, motor control impaired, postural control impaired
 
 Bed Mobility
 1P+1P for safety, Mod A for trunk and LE management due to weakness, deconditioning.  able 
to sit EOB using arms for support.
 Assistive Device: HOB elevated, bed rails
  
  
 Scoot/Bridge, Level of Leon: maximal assist (25% patient effort), 2 person assist r
equired, tactile cues required
 Supine to Sit, Level of Leon: moderate assist (50% patient effort), 1 person + 1 pe
rson to manage equipment, set up required, verbal cues required, tactile cues required
 Sit to Supine, Level of Leon: moderate assist (50% patient effort), 1 person + 1 pe
rson to manage equipment, set up required, verbal cues required, tactile cues required
  
  
 Safety Issues: decreased use of arms for pushing/pulling, decreased use of legs for bridgin
g/pushing, impaired trunk control for bed mobility
 Impairments: decreased flexibility, muscle tone abnormal, strength decreased, impaired hazel
nce, coordination impaired, motor control impaired, postural control impaired
  
  
  
  
  
  
 
 Balance
  
 Sitting Balance: Static: fair balance
 Sitting Balance: Dynamic: fair balance
 Standing Balance: Static: unable to balance
   
  
  
  
  
  
  
  
  
  
  
  
  
  
  
  
  
  
  
  
  
  
 
 Functional Endurance
 Poor, pt fatigues on minimmal activity 
 
 
 ROM
  
 ROM Testing Results: no range of motion deficits identified
  
  
  
  
   
  
  
  
  
  
  
  
  
  
 
 Strength
 generalized weakness
  
  
  
  
  
  
  
  
  
  
  
  
  
  
  
  
  
  
  
 
 PT Goal Review Date  
 
   Most Recent Value 
 STG Review Date  20 at 2020 1434 
 
  
 
  
  
  
  
 Sidelying-Sit-Sidelying Goal  
 
   Most Recent Value 
 STG Status  new at 2020 1434 
 STG Leon Level  modified independent at 2020 1434 
 STG Assistive Device  HOB elevated, bed rails at 2020 1434 
 STG Comments  Pt has powered bed at home with elevating head and height. at 2020 1434
 
 
 
  
 
  
 Bed-Chair-Bed Goal  
 
   Most Recent Value 
 STG Status  new at 2020 1434 
 STG Leon Level  modified independent at 2020 1434 
 STG Assistive Device  sliding board, wheelchair at 2020 1434 
 
  
 
  
  
  
   
  
  
  
  
 Wheelchair Goal  
 
   Most Recent Value 
 STG Status  new at 2020 1434 
 STG  Pt will propel manual wc 50 feet with supervision to improve his safety and independen
ce at 2020 1434 
 
  
 
  
  
  
  
  
  
  
  
 
 PT Time Calculation
 PT Additional Treatment Time: Co-treatment
 PT Co-treatment Start Time: 1340
 PT Co-treatment Stop Time: 1412
 PT Co-treatment Total Time: 32
 Missed Treatment Time: 0
 PT Total Treatment Time: 32
 Timed [place] Code Minutes: 12
 
 Electronically signed by: Ric Coppola PT, 2020 2:48 PMElectronically signed by JANA Coppola PT at 2020  2:50 PM PSTPlan of Care - Yoli Hernandez OT - 2020  1:40 PM PSTFormatting of this note might be different from the original.
 Occupational Therapy Plan of Care
 Initial Evaluation, Treatment Note
 
 Summary:   Zeus presents to occupational therapy with Decreased ADL independence, functi
onal mobility, activity tolerance s/p admission for elevated troponin and weakness.  Objecti
ve exam reveals impairments with aerobic capacity/endurance, anthropometric characteristics,
 arousal, attention, and cognition, ergonomics and body mechanics, functional endurance/acti
vity tolerance, gait, locomotion, and balance, motor function, muscle performance, posture, 
reflex integrity. Cleared for therapy by PT/OT evaluation by nursing. Nursing debriefed foll
owing session. Session focused on bed mobility, commode t/f training, and toileting tasks. 
 
 
 Barriers to discharge and functional limitations include decreased bed mobility, decreased 
functional transfers, decreased ability to perform ADLs, decreased ability to perform IADLs,
 lives alone, unsafe discharge disposition, and medical status. Recommending need for SNF fo
r rehabilitation. 
  
 Zeus will benefit from therapeutic intervention to address impairments and increase safe
ty and independence with activities necessary for safe discharge.  Refer below for specific 
details regarding functional levels.
 
 Precautions/Limitations: falls
 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
  
  
 Previous Level of Function: 
 Transferring: (reports lateral slide transfer from power elevated bed to ma)
 Ambulation: independent
 Toileting: independent
 Bathing: assistive person
 Dressing: independent
 Eating: independent
 Communication: understands/communicates without difficulty
 Swallowin-->swallows foods/liquids without difficulty
 Equipment Currently Used at Home: wheelchair
 Prior Functional Level Comment: Pt reports using manual WC for mobility, lives alone, has c
aregiver 4 hrs/day, ramp to enter home, needs assist with bathing.
 
 Potential available assistance at discharge:
 Significant Relationships: other (see comments)(ex wife)
 Parent Marital Status: 
 Provides Primary Care For: no one, unable/limited ability to care for self
 
 Living Environment/Accessibility: 
 Lives With: alone
 Living Arrangements: house
 Home Accessibility: ramps present at home
 Financial Concerns: none
 Transportation Available: ambulance
 Living Environment Comment: pt reports using manual WC for mobility, lives alone, has ramp,
 caregivers 4 hours /day, dresses self but needs help with showering
 
 Patient/Family  s Goals:  home
 
 Rehabilitation potential: good, to achieve stated therapy goals
 
 Occupational Therapy Discharge Recommendations are:
 Recommended discharge disposition:  skilled nursing facility
 Post discharge occupational therapy recommendation:  pt is motivated participant, ongoing l
ow intensity therapy, home health
 
 Equipment Recommendations:  reacher, toilet safety frame, sock aide, gait belt, grab bars, 
shower chair
 
 Planned Interventions:ADL retraining, balance training, bed mobility training, functional e
ndurance training, strengthening, transfer training
 Recommended Frequency: (3-5x's)
 
 Patient Status/Goals:
  Reflects last filed data and may be from multiple contributors.
 ADLs 
 
   
 
  
  
  
  
  
 
  
  
  
  
  
 
 max A for donning/straightening out socks.
 LB Dressing, Level of Leon: maximal assist (25% patient effort)
 Assistive Device: none
 LB Dressing Assess/Train, Position: sitting
 LB Dressing Impairments: decreased flexibility, ROM decreased, impaired functional enduranc
e/activity tolerance, pain, strength decreased
 
 min assist for thoroughness, max for clothing management. set-up assist and cueing to atten
d to task.
 Toileting, Level of Leon: maximal assist (25% patient effort), verbal cues required
, set up required, tactile cues required
 Assistive Device: bariatric bedside commode
 Toileting Assess/Train, Position: sitting, supported standing
 Toileting Impairments: decreased flexibility, ROM decreased, impaired functional endurance/
activity tolerance, impaired balance, strength decreased
  
  Cognitive
  
 Mood/Behavior: calm, cooperative
 Orientation: oriented x 4
   
 Bed Mobility
 1+1p for safety Mod A for trunk into upright sitting. assist with LE management d/t weaknes
s, deconditioning. 
 Assistive Device: HOB elevated, bed rails
  
  
 Scoot/Bridge, Level of Leon: maximal assist (25% patient effort), 2 person assist r
equired, tactile cues required
 Supine to Sit, Level of Leon: moderate assist (50% patient effort), 1 person + 1 pe
rson to manage equipment, set up required, verbal cues required, tactile cues required
 Sit to Supine, Level of Leon: moderate assist (50% patient effort), 1 person + 1 pe
rson to manage equipment, set up required, verbal cues required, tactile cues required
  
  
 Safety Issues: decreased use of arms for pushing/pulling, decreased use of legs for bridgin
g/pushing, impaired trunk control for bed mobility
 Impairments: decreased flexibility, muscle tone abnormal, strength decreased, impaired hazel
nce, coordination impaired, motor control impaired, postural control impaired
 
 Transfers
 2p Max A squat pivot to<>from bed to commode d/t weakness, gross deconditioning. increased 
time/effort and cueing for hand placement and sequencing.
  
  
  
 
 Sit-Stand, Level of Leon: maximal assist (25% patient effort), 2 person assist requ
ired, set up required, verbal cues required, tactile cues required
 Stand-Sit, Level of Leon: maximal assist (25% patient effort), 2 person assist requ
ired, set up required, verbal cues required, tactile cues required
  
   
 ROM
  
 ROM Testing Results: no range of motion deficits identified
  
  
  
 Strength
 generalized weakness
  
  
  
   
 
 OT Goal Review Date  
 
   Most Recent Value 
 STG Review Date  20 at 2020 1412 
 
  
 
  
 Grooming Goal  
 
   Most Recent Value 
 STG Status  new at 2020 1412 
 STG Leon Level  set up required at 2020 1412 
 STG Position  sitting in chair at 2020 1412 
 
  
 
  
  
 LB Dressing Goal  
 
   Most Recent Value 
 STG Status  new at 2020 1412 
 STG Leon Level  stand by assist, set up required at 2020 1412 
 STG Adaptive Equipment  -- [AE PRN] at 2020 1412 
 
  
 
  
 Toilet Transfer Goal  
 
   Most Recent Value 
 STG Status  new at 2020 1412 
 STG Leon Level  contact guard assist at 2020 1412 
 STG Assistive Device  grab bars, sliding board, commode (3 in 1) at 2020 1412 
 
  
 
  
  OT Time Calculation
 OT Additional Treatment Time: Co-treatment
 
 OT Co-treatment Start Time: 1340
 OT Co-treatment Stop Time: 141
 OT Co-treatment Total Time: 32
 OT Total Treatment Time: 32
 Timed [place] Code Minutes: 0
 
 Electronically signed by: Yoli Hernandez OT, 2020 2:48 PM
 Electronically signed by Yoli Hernandez OT at 2020  2:52 PM PSTPlan of [facility] Zo[facility] - 2020 10:43 AM PSTDischarge Planning:
 This CM Asst  Spoke with Cesar at his bedside regarding discharge plans.
 
 Cesar reports he lives alone in his single level home in [place]. There are no stairs to e
nter and no stairs inside the home. The bathroom has a tub shower.
 
 Cesar reports he does not use any medical equipment at home.
 
 Cesar reports he is having a hard time caring for himself. He states he is having diarrhea t
hat just wears him out. He has been having multiple falls at home. He reports the Fire Dept 
has stated he is unsafe at home and they will no longer come help him when he falls.
 
 Cesar reports he has a new caregiver in the home that "just started." The caregiver will com
e for four hours per day Monday - Friday and will help with cooking, cleaning and ADL's.
 
 Cesar reports his PCP is in [place] but he does not recall her name. He uses the [place]
 [facility].
 
 Cesar is agreeable to SNF. Referral sent to [facility].
 
 Dispo: TBD
 Electronically signed by: Zoie Montgomery 2020 10:52 AM
 
 Electronically signed by Zoie Montgomery at 2020 10:52 AM PSTPlan of Care - Brittnee Petersen OT - 2020  9:16 AM PSTTherapy Plan of Care
 Missed Visit Note
 
 Patient Information 
 Patient Name:  Zeus Hunag
 YOB: 1950  Age:  69 y.o.
 Medical Record #:  [id number]
 
 The patient was unable to be seen for today's scheduled visit due to pt expressing desire t
o rest. Pt agreeable to brief conversation discussing OT. 
 
 Plan: Pt to be seen later today for OT eval as able, otherwise to be seen for OT upon next 
scheduled visit. 
 
 Electronically signed by: Leslie Petersen OT, 2020 9:32 AM 
 Electronically signed by Leslie Petersen OT at 2020  9:34 AM PSTPlan of Care - Caitlyn Arias RN - 2020  6:39 PM PSTMike's VSS. 96-99% on RA.  Lungs dim t.o.  Started hav
ing loose stools after paracentesis done.  Awaiting specimen results.  No PT d/t procedure t
his afternoon.  Able to help turn himself.  Heals pink and blanchable now. Elevated on ney
ws.Electronically signed by Caitlyn Arias RN at 2020  6:42 PM PSTPlan of Care - Leslie Chavira OT - 2020  3:40 PM PSTTherapy Plan of Care
 Missed Visit Note
 
 Patient Information 
 Patient Name:  Zeus Huang
 YOB: 1950  Age:  69 y.o.
 Medical Record #:  [id number]
 
 
 The patient was unable to be seen for today's scheduled visit for OT/PT co-evaluation due t
o pt underwent paracentesis this afternoon and is now requiring rest following procedure per
 RN, thus not appropriate for OT/PT eval at this time.
 
 Plan: OT/PT eval upon next scheduled visit as pt is medically appropriate. 
 
 Electronically signed by: Leslie Petersen OT, 2020 3:44 PM 
 Electronically signed by Leslie Petersen OT at 2020  3:47 PM PSTPlan of Care - Charmaine Zheng - 2020  2:53 PM PSTDischarge Planning:
 
 Zeus is in the middle of a procedure CM will need to follow up with him tomorrow.
 
 DISP: TBD
 Electronically signed by: Charmaine Zheng 2020 2:54 PM
 Electronically signed by Charmaine Zheng at 2020  2:54 PM PSTPlan of [facility] - Tucker Chakraborty RN - 2020  5:59 AM PSTZeus is A&Ox3. He reports increased weakness. VSS. Patien
t rested well between cares.Heels elevated off bed due to L foot heel wound and R foot toe w
ound. Frequent weight shifts provided. Telemetry in place. Straight cath done due to bladder
 scan stating pt had 897ml, straight cath put out 125ml. NPO started at midnight. 
 
 Problem: Skin Injury Risk Increased
 Goal: Skin Health and Integrity
 Outcome: Ongoing, progressing
  
 Problem: Hypertension Acute
 Goal: Blood Pressure Within Desired Range
 Outcome: Ongoing, progressing
  
 Problem: Mobility Impairment
 Goal: Optimal Mobility
 Outcome: Ongoing, progressing
  Electronically signed by Idania Chakraborty RN at 2020  6:08 AM PSTPlan of [facility] - Kingston Rogers RN - 2020  6:58 PM PSTMr huang is a direct admitt and arrives by ambula
[facility]. He is being admitted to  [address] [place] [address] . He is alert oriented and appropriate. He appears 
weak. abd is distended but pt states this is his norm. He has 2 + edema in his leg. Report r
eceived from transfering RN at [facility] in [place]. admitt questions defered 
to noc shift as he is being admitted near the end of shift. RT has been in to do a EKC and ISSA Reyes has been in to see pt and write orders. Mr Huang denies any chest pain or dysp
misa and states he has not had any chest pain just mild passing dyspnea but state he is weake
r and unable to care for him self including being unable to transfer to his W/C. He is able 
to use the call light Electronically signed by Kingston Rogers RN at 2020  7:06 PM PSTd
ocumented in this encounter
 
 Plan of Treatment
 Not on filedocumented as of this encounter
 
 Procedures
 
 
+----------------------+--------+-------------+----------------------+----------------------
+
| Procedure Name       | Priori | Date/Time   | Associated Diagnosis | Comments             
|
|                      | ty     |             |                      |                      
|
+----------------------+--------+-------------+----------------------+----------------------
+
| POC GLUCOSE          | Routin | 02/10/2020  |                      |   Results for this   
|
|                      | e      | 12:15 PM    |                      | procedure are in the 
 
|
|                      |        | PST         |                      |  results section.    
|
+----------------------+--------+-------------+----------------------+----------------------
+
| POC GLUCOSE          | Routin | 02/10/2020  |                      |   Results for this   
|
|                      | e      |  6:06 AM    |                      | procedure are in the 
|
|                      |        | PST         |                      |  results section.    
|
+----------------------+--------+-------------+----------------------+----------------------
+
| CBC WITH             | Routin | 02/10/2020  |                      |   Results for this   
|
| DIFFERENTIAL         | e      |  4:53 AM    |                      | procedure are in the 
|
|                      |        | PST         |                      |  results section.    
|
+----------------------+--------+-------------+----------------------+----------------------
+
| MAGNESIUM            | Routin | 02/10/2020  |                      |   Results for this   
|
|                      | e      |  4:53 AM    |                      | procedure are in the 
|
|                      |        | PST         |                      |  results section.    
|
+----------------------+--------+-------------+----------------------+----------------------
+
| COMPREHENSIVE        | Routin | 02/10/2020  |                      |   Results for this   
|
| METABOLIC PANEL      | e      |  4:53 AM    |                      | procedure are in the 
|
|                      |        | PST         |                      |  results section.    
|
+----------------------+--------+-------------+----------------------+----------------------
+
| POC GLUCOSE          | Routin | 2020  |                      |   Results for this   
|
|                      | e      |  8:26 PM    |                      | procedure are in the 
|
|                      |        | PST         |                      |  results section.    
|
+----------------------+--------+-------------+----------------------+----------------------
+
| POC GLUCOSE          | Routin | 2020  |                      |   Results for this   
|
|                      | e      |  5:03 PM    |                      | procedure are in the 
|
|                      |        | PST         |                      |  results section.    
|
+----------------------+--------+-------------+----------------------+----------------------
+
| POC GLUCOSE          | Routin | 2020  |                      |   Results for this   
|
|                      | e      | 12:10 PM    |                      | procedure are in the 
|
|                      |        | PST         |                      |  results section.    
|
+----------------------+--------+-------------+----------------------+----------------------
 
+
| POC GLUCOSE          | Routin | 2020  |                      |   Results for this   
|
|                      | e      |  6:48 AM    |                      | procedure are in the 
|
|                      |        | PST         |                      |  results section.    
|
+----------------------+--------+-------------+----------------------+----------------------
+
| CBC WITH             | Routin | 2020  |                      |   Results for this   
|
| DIFFERENTIAL         | e      |  5:01 AM    |                      | procedure are in the 
|
|                      |        | PST         |                      |  results section.    
|
+----------------------+--------+-------------+----------------------+----------------------
+
| MAGNESIUM            | Routin | 2020  |                      |   Results for this   
|
|                      | e      |  5:01 AM    |                      | procedure are in the 
|
|                      |        | PST         |                      |  results section.    
|
+----------------------+--------+-------------+----------------------+----------------------
+
| COMPREHENSIVE        | Routin | 2020  |                      |   Results for this   
|
| METABOLIC PANEL      | e      |  5:01 AM    |                      | procedure are in the 
|
|                      |        | PST         |                      |  results section.    
|
+----------------------+--------+-------------+----------------------+----------------------
+
| POC GLUCOSE          | Routin | 2020  |                      |   Results for this   
|
|                      | e      |  4:44 PM    |                      | procedure are in the 
|
|                      |        | PST         |                      |  results section.    
|
+----------------------+--------+-------------+----------------------+----------------------
+
| POC GLUCOSE          | Routin | 2020  |                      |   Results for this   
|
|                      | e      | 12:13 PM    |                      | procedure are in the 
|
|                      |        | PST         |                      |  results section.    
|
+----------------------+--------+-------------+----------------------+----------------------
+
| POC GLUCOSE          | Routin | 2020  |                      |   Results for this   
|
|                      | e      |  8:05 AM    |                      | procedure are in the 
|
|                      |        | PST         |                      |  results section.    
|
+----------------------+--------+-------------+----------------------+----------------------
+
| POC GLUCOSE          | Routin | 2020  |                      |   Results for this   
|
|                      | e      |  5:26 AM    |                      | procedure are in the 
 
|
|                      |        | PST         |                      |  results section.    
|
+----------------------+--------+-------------+----------------------+----------------------
+
| CBC WITH             | Routin | 2020  |                      |   Results for this   
|
| DIFFERENTIAL         | e      |  5:25 AM    |                      | procedure are in the 
|
|                      |        | PST         |                      |  results section.    
|
+----------------------+--------+-------------+----------------------+----------------------
+
| MAGNESIUM            | Routin | 2020  |                      |   Results for this   
|
|                      | e      |  5:25 AM    |                      | procedure are in the 
|
|                      |        | PST         |                      |  results section.    
|
+----------------------+--------+-------------+----------------------+----------------------
+
| COMPREHENSIVE        | Routin | 2020  |                      |   Results for this   
|
| METABOLIC PANEL      | e      |  5:25 AM    |                      | procedure are in the 
|
|                      |        | PST         |                      |  results section.    
|
+----------------------+--------+-------------+----------------------+----------------------
+
| POC GLUCOSE          | Routin | 2020  |                      |   Results for this   
|
|                      | e      |  9:26 PM    |                      | procedure are in the 
|
|                      |        | PST         |                      |  results section.    
|
+----------------------+--------+-------------+----------------------+----------------------
+
| POC GLUCOSE          | Routin | 2020  |                      |   Results for this   
|
|                      | e      |  4:51 PM    |                      | procedure are in the 
|
|                      |        | PST         |                      |  results section.    
|
+----------------------+--------+-------------+----------------------+----------------------
+
| CLOSTRIDIOIDES       | Routin | 2020  |                      |   Results for this   
|
| DIFFICILE NAAT       | e      |  2:09 PM    |                      | procedure are in the 
|
| REFLEX               |        | PST         |                      |  results section.    
|
+----------------------+--------+-------------+----------------------+----------------------
+
| CLOSTRIDIOIDES       | Routin | 2020  |                      |   Results for this   
|
| DIFFICILE NAAT       | e      |  2:09 PM    |                      | procedure are in the 
|
| REFLEX TO TOX AG     |        | PST         |                      |  results section.    
|
+----------------------+--------+-------------+----------------------+----------------------
 
+
| POC GLUCOSE          | Routin | 2020  |                      |   Results for this   
|
|                      | e      | 11:32 AM    |                      | procedure are in the 
|
|                      |        | PST         |                      |  results section.    
|
+----------------------+--------+-------------+----------------------+----------------------
+
| CBC WITH             | Routin | 2020  |                      |   Results for this   
|
| DIFFERENTIAL         | e      |  6:45 AM    |                      | procedure are in the 
|
|                      |        | PST         |                      |  results section.    
|
+----------------------+--------+-------------+----------------------+----------------------
+
| POC GLUCOSE          | Routin | 2020  |                      |   Results for this   
|
|                      | e      |  6:36 AM    |                      | procedure are in the 
|
|                      |        | PST         |                      |  results section.    
|
+----------------------+--------+-------------+----------------------+----------------------
+
| MAGNESIUM            | Routin | 2020  |                      |   Results for this   
|
|                      | e      |  4:13 AM    |                      | procedure are in the 
|
|                      |        | PST         |                      |  results section.    
|
+----------------------+--------+-------------+----------------------+----------------------
+
| COMPREHENSIVE        | Routin | 2020  |                      |   Results for this   
|
| METABOLIC PANEL      | e      |  4:13 AM    |                      | procedure are in the 
|
|                      |        | PST         |                      |  results section.    
|
+----------------------+--------+-------------+----------------------+----------------------
+
| POC GLUCOSE          | Routin | 2020  |                      |   Results for this   
|
|                      | e      |  8:37 PM    |                      | procedure are in the 
|
|                      |        | PST         |                      |  results section.    
|
+----------------------+--------+-------------+----------------------+----------------------
+
| STOOL PATHOGENS,     | Routin | 2020  |                      |   Results for this   
|
| NAAT, 6 TO 11        | e      |  5:59 PM    |                      | procedure are in the 
|
| TARGETS              |        | PST         |                      |  results section.    
|
+----------------------+--------+-------------+----------------------+----------------------
+
| POC GLUCOSE          | Routin | 2020  |                      |   Results for this   
|
|                      | e      |  4:54 PM    |                      | procedure are in the 
 
|
|                      |        | PST         |                      |  results section.    
|
+----------------------+--------+-------------+----------------------+----------------------
+
| PH, BODY FLUID       | Routin | 2020  |                      |   Results for this   
|
|                      | e      |  3:59 PM    |                      | procedure are in the 
|
|                      |        | PST         |                      |  results section.    
|
+----------------------+--------+-------------+----------------------+----------------------
+
| CULTURE, BODY FLUID, | Routin | 2020  |                      |   Results for this   
|
|  AEROBE              | e      |  3:25 PM    |                      | procedure are in the 
|
|                      |        | PST         |                      |  results section.    
|
+----------------------+--------+-------------+----------------------+----------------------
+
| LACTATE              | Routin | 2020  |                      |   Results for this   
|
| DEHYDROGENASE, BODY  | e      |  3:25 PM    |                      | procedure are in the 
|
| FLUID                |        | PST         |                      |  results section.    
|
+----------------------+--------+-------------+----------------------+----------------------
+
| CULTURE, BODY FLUID, | Routin | 2020  |                      |   Results for this   
|
|  ANAEROBE            | e      |  3:25 PM    |                      | procedure are in the 
|
|                      |        | PST         |                      |  results section.    
|
+----------------------+--------+-------------+----------------------+----------------------
+
| CULTURE, BODY FLUID, | Routin | 2020  |                      |   Results for this   
|
|  ANAEROBE            | e      |  3:25 PM    |                      | procedure are in the 
|
|                      |        | PST         |                      |  results section.    
|
+----------------------+--------+-------------+----------------------+----------------------
+
| CELL COUNT WITH      | Routin | 2020  |                      |   Results for this   
|
| DIFFERENTIAL, BODY   | e      |  3:25 PM    |                      | procedure are in the 
|
| FLUID                |        | PST         |                      |  results section.    
|
+----------------------+--------+-------------+----------------------+----------------------
+
| PROTEIN, BODY FLUID  | Routin | 2020  |                      |   Results for this   
|
|                      | e      |  3:25 PM    |                      | procedure are in the 
|
|                      |        | PST         |                      |  results section.    
|
+----------------------+--------+-------------+----------------------+----------------------
 
+
| GLUCOSE, BODY FLUID  | Routin | 2020  |                      |   Results for this   
|
|                      | e      |  3:25 PM    |                      | procedure are in the 
|
|                      |        | PST         |                      |  results section.    
|
+----------------------+--------+-------------+----------------------+----------------------
+
| ALBUMIN, BODY FLUID  | Routin | 2020  |                      |   Results for this   
|
|                      | e      |  3:25 PM    |                      | procedure are in the 
|
|                      |        | PST         |                      |  results section.    
|
+----------------------+--------+-------------+----------------------+----------------------
+
| US GUIDED            | Routin | 2020  |                      |   Results for this   
|
| PARACENTESIS         | e      |  3:24 PM    |                      | procedure are in the 
|
|                      |        | PST         |                      |  results section.    
|
+----------------------+--------+-------------+----------------------+----------------------
+
| NM NUCLEAR STRESS    | Routin | 2020  |                      |   Results for this   
|
| TEST (PHARMACOLOGIC  | e      | 12:16 PM    |                      | procedure are in the 
|
| - VASODILATOR)       |        | PST         |                      |  results section.    
|
+----------------------+--------+-------------+----------------------+----------------------
+
| POC GLUCOSE          | Routin | 2020  |                      |   Results for this   
|
|                      | e      | 12:02 PM    |                      | procedure are in the 
|
|                      |        | PST         |                      |  results section.    
|
+----------------------+--------+-------------+----------------------+----------------------
+
| ECHO COMPLETE        | Routin | 2020  |                      |   Results for this   
|
|                      | e      |  8:24 AM    |                      | procedure are in the 
|
|                      |        | PST         |                      |  results section.    
|
+----------------------+--------+-------------+----------------------+----------------------
+
| US ABDOMEN LIMITED   | Routin | 2020  |                      |   Results for this   
|
|                      | e      |  7:32 AM    |                      | procedure are in the 
|
|                      |        | PST         |                      |  results section.    
|
+----------------------+--------+-------------+----------------------+----------------------
+
| POC GLUCOSE          | Routin | 2020  |                      |   Results for this   
|
|                      | e      |  6:39 AM    |                      | procedure are in the 
 
|
|                      |        | PST         |                      |  results section.    
|
+----------------------+--------+-------------+----------------------+----------------------
+
| TROPONIN I           | Routin | 2020  |                      |   Results for this   
|
|                      | e      |  6:02 AM    |                      | procedure are in the 
|
|                      |        | PST         |                      |  results section.    
|
+----------------------+--------+-------------+----------------------+----------------------
+
| CBC WITH             | Routin | 2020  |                      |   Results for this   
|
| DIFFERENTIAL         | e      |  6:02 AM    |                      | procedure are in the 
|
|                      |        | PST         |                      |  results section.    
|
+----------------------+--------+-------------+----------------------+----------------------
+
| MAGNESIUM            | Routin | 2020  |                      |   Results for this   
|
|                      | e      |  6:02 AM    |                      | procedure are in the 
|
|                      |        | PST         |                      |  results section.    
|
+----------------------+--------+-------------+----------------------+----------------------
+
| COMPREHENSIVE        | Routin | 2020  |                      |   Results for this   
|
| METABOLIC PANEL      | e      |  6:02 AM    |                      | procedure are in the 
|
|                      |        | PST         |                      |  results section.    
|
+----------------------+--------+-------------+----------------------+----------------------
+
| ECG 12 LEAD          | Routin | 2020  |                      |   Results for this   
|
|                      | e      |  4:54 AM    |                      | procedure are in the 
|
|                      |        | PST         |                      |  results section.    
|
+----------------------+--------+-------------+----------------------+----------------------
+
| POC GLUCOSE          | Routin | 2020  |                      |   Results for this   
|
|                      | e      |  3:14 AM    |                      | procedure are in the 
|
|                      |        | PST         |                      |  results section.    
|
+----------------------+--------+-------------+----------------------+----------------------
+
| TROPONIN I           | Routin | 2020  |                      |   Results for this   
|
|                      | e      | 11:55 PM    |                      | procedure are in the 
|
|                      |        | PST         |                      |  results section.    
|
+----------------------+--------+-------------+----------------------+----------------------
 
+
| POC GLUCOSE          | Routin | 2020  |                      |   Results for this   
|
|                      | e      | 10:01 PM    |                      | procedure are in the 
|
|                      |        | PST         |                      |  results section.    
|
+----------------------+--------+-------------+----------------------+----------------------
+
| AMMONIA              | Routin | 2020  |                      |   Results for this   
|
|                      | e      |  8:19 PM    |                      | procedure are in the 
|
|                      |        | PST         |                      |  results section.    
|
+----------------------+--------+-------------+----------------------+----------------------
+
| EXTRA GOLD TOP TUBE  | Routin | 2020  |                      |   Results for this   
|
|                      | e      |  8:05 PM    |                      | procedure are in the 
|
|                      |        | PST         |                      |  results section.    
|
+----------------------+--------+-------------+----------------------+----------------------
+
| EXTRA LAVENDER TOP   | Routin | 2020  |                      |   Results for this   
|
| TUBE                 | e      |  8:03 PM    |                      | procedure are in the 
|
|                      |        | PST         |                      |  results section.    
|
+----------------------+--------+-------------+----------------------+----------------------
+
| PROTIME INR          | Add-On | 2020  |                      |   Results for this   
|
|                      |        |  7:56 PM    |                      | procedure are in the 
|
|                      |        | PST         |                      |  results section.    
|
+----------------------+--------+-------------+----------------------+----------------------
+
| LACTATE              | Add-On | 2020  |                      |   Results for this   
|
| DEHYDROGENASE        |        |  7:56 PM    |                      | procedure are in the 
|
|                      |        | PST         |                      |  results section.    
|
+----------------------+--------+-------------+----------------------+----------------------
+
| HEMOGLOBIN A1C       | Routin | 2020  |                      |   Results for this   
|
|                      | e      |  6:38 PM    |                      | procedure are in the 
|
|                      |        | PST         |                      |  results section.    
|
+----------------------+--------+-------------+----------------------+----------------------
+
| ECG 12 LEAD          | Routin | 2020  |                      |   Results for this   
|
|                      | e      |  6:22 PM    |                      | procedure are in the 
 
|
|                      |        | PST         |                      |  results section.    
|
+----------------------+--------+-------------+----------------------+----------------------
+
| LIPID PANEL          | Routin | 2020  |                      |   Results for this   
|
|                      | e      |  6:15 PM    |                      | procedure are in the 
|
|                      |        | PST         |                      |  results section.    
|
+----------------------+--------+-------------+----------------------+----------------------
+
| TROPONIN I           | Routin | 2020  |                      |   Results for this   
|
|                      | e      |  6:15 PM    |                      | procedure are in the 
|
|                      |        | PST         |                      |  results section.    
|
+----------------------+--------+-------------+----------------------+----------------------
+
| TSH                  | Add-On | 2020  |                      |   Results for this   
|
|                      |        |  6:15 PM    |                      | procedure are in the 
|
|                      |        | PST         |                      |  results section.    
|
+----------------------+--------+-------------+----------------------+----------------------
+
| XR CHEST 1 VIEW      | Routin | 2020  |                      |   Results for this   
|
|                      | e      |  3:05 PM    |                      | procedure are in the 
|
|                      |        | PST         |                      |  results section.    
|
+----------------------+--------+-------------+----------------------+----------------------
+
| ECG - EXTERNAL SCAN  |        | 2020  |                      |   Results for this   
|
|                      |        | 12:00 AM    |                      | procedure are in the 
|
|                      |        | PST         |                      |  results section.    
|
+----------------------+--------+-------------+----------------------+----------------------
+
| MEDICAL CYTOLOGY     | Routin | 2020  |                      |   Results for this   
|
|                      | e      | 12:00 AM    |                      | procedure are in the 
|
|                      |        | PST         |                      |  results section.    
|
+----------------------+--------+-------------+----------------------+----------------------
+
 documented in this encounter
 
 Results
 POC Glucose (02/10/2020 12:15 PM PST)
 
+-----------+---------+----------------+-------------+--------------+
| Component | Value   | Ref Range      | Performed   | Pathologist  |
 
|           |         |                | At          | Signature    |
+-----------+---------+----------------+-------------+--------------+
| Glucose,  | 200 (H) | 70 - 109 mg/dL | [facility]  |              |
| [facility]       |         |                | [facility]    |              |
|           |         |                | [facility]     |              |
|           |         |                | [facility] -    |              |
|           |         |                | LABORATORY  |              |
+-----------+---------+----------------+-------------+--------------+
 
 
 
+----------+
| Specimen |
+----------+
| Blood    |
+----------+
 
 
 
+----------------------+--------------------+--------------------+----------------+
| Performing           | Address            | City/State/Zipcode | Phone Number   |
| Organization         |                    |                    |                |
+----------------------+--------------------+--------------------+----------------+
|   [facility]     |   [address] |   [place]  |   [phone] |
| [facility]  |                    | [id number]              |                |
| - LABORATORY         |                    |                    |                |
+----------------------+--------------------+--------------------+----------------+
 POC Glucose (02/10/2020  6:06 AM PST)
 
+-----------+---------+----------------+-------------+--------------+
| Component | Value   | Ref Range      | Performed   | Pathologist  |
|           |         |                | At          | Signature    |
+-----------+---------+----------------+-------------+--------------+
| Glucose,  | 158 (H) | 70 - 109 mg/dL | [facility]  |              |
| POC       |         |                | [facility]    |              |
|           |         |                | MED[facility]     |              |
|           |         |                | CENTER -    |              |
|           |         |                | LABORATORY  |              |
+-----------+---------+----------------+-------------+--------------+
 
 
 
+----------+
| Specimen |
+----------+
| Blood    |
+----------+
 
 
 
+----------------------+--------------------+--------------------+----------------+
| Performing           | Address            | City/State/Zipcode | Phone Number   |
| Organization         |                    |                    |                |
+----------------------+--------------------+--------------------+----------------+
|   [facility]     |   [address] |   [place]  |   [phone] |
| [facility]  |                    | [address]              |                |
| - LABORATORY         |                    |                    |                |
+----------------------+--------------------+--------------------+----------------+
 Magnesium (02/10/2020  4:53 AM PST)
 
 
+-----------+-------+-----------------+-------------+--------------+
| Component | Value | Ref Range       | Performed   | Pathologist  |
|           |       |                 | At          | Signature    |
+-----------+-------+-----------------+-------------+--------------+
| Magnesium | 1.6   | 1.6 - 2.6 mg/dL | [facility]  |              |
|           |       |                 |  [facility]    |              |
|           |       |                 | MEDICAL     |              |
|           |       |                 | CENTER -    |              |
|           |       |                 | LABORATORY  |              |
+-----------+-------+-----------------+-------------+--------------+
 
 
 
+----------+
| Specimen |
+----------+
| Blood    |
+----------+
 
 
 
+----------------------+--------------------+--------------------+----------------+
| Performing           | Address            | City/State/Zipcode | Phone Number   |
| Organization         |                    |                    |                |
+----------------------+--------------------+--------------------+----------------+
|   [facility]     |   [address] |   [place]  |   [phone] |
| [facility]  |                    | [id number]              |                |
| - LABORATORY         |                    |                    |                |
+----------------------+--------------------+--------------------+----------------+
 Comprehensive Metabolic Panel (02/10/2020  4:53 AM PST)
 
+-------------+--------------------------+-----------------+-------------+--------------+
| Component   | Value                    | Ref Range       | Performed   | Pathologist  |
|             |                          |                 | At          | Signature    |
+-------------+--------------------------+-----------------+-------------+--------------+
| Na          | 135 (L)                  | 136 - 145       | [facility]  |              |
|             |                          | mmol/L          |  [facility]    |              |
|             |                          |                 | [facility]     |              |
|             |                          |                 | [facility] -    |              |
|             |                          |                 | LABORATORY  |              |
+-------------+--------------------------+-----------------+-------------+--------------+
| K           | 4.8                      | 3.4 - 5.1       | [facility]  |              |
|             |                          | mmol/L          | [facility]    |              |
|             |                          |                 | [facility]     |              |
|             |                          |                 | [facility]ENTER -    |              |
|             |                          |                 | LABORATORY  |              |
+-------------+--------------------------+-----------------+-------------+--------------+
| Cl          | 105                      | 98 - 107 mmol/L | [facility]  |              |
|             |                          |                 | [facility]    |              |
|             |                          |                 | [facility]     |              |
|             |                          |                 | CENTER -    |              |
|             |                          |                 | LABORATORY  |              |
+-------------+--------------------------+-----------------+-------------+--------------+
| CO2         | 27                       | 20 - 31 mmol/L  | [facility]  |              |
|             |                          |                 | [facility]    |              |
|             |                          |                 | [facility]     |              |
|             |                          |                 | CENTER -    |              |
|             |                          |                 | LABORATORY  |              |
+-------------+--------------------------+-----------------+-------------+--------------+
| Anion Gap   | 3                        | 3 - 16 mmol/L   | [facility]  |              |
 
|             |                          |                 | [facility]    |              |
|             |                          |                 | [facility]     |              |
|             |                          |                 | CENTER -    |              |
|             |                          |                 | LABORATORY  |              |
+-------------+--------------------------+-----------------+-------------+--------------+
| Glucose     | 175 (H)                  | 60 - 106 mg/dL  | [facility]  |              |
|             |                          |                 | [facility]    |              |
|             |                          |                 | [facility]     |              |
|             |                          |                 | [facility] -    |              |
|             |                          |                 | LABORATORY  |              |
+-------------+--------------------------+-----------------+-------------+--------------+
| BUN         | 16                       | 9 - 23 mg/dL    | [facility]  |              |
|             |                          |                 | [facility]    |              |
|             |                          |                 | [facility]     |              |
|             |                          |                 | CENTER -    |              |
|             |                          |                 | LABORATORY  |              |
+-------------+--------------------------+-----------------+-------------+--------------+
| Creatinine  | 0.89                     | 0.70 - 1.30     | [facility]  |              |
|             |                          | mg/dL           | [facility]    |              |
|             |                          |                 | [facility]     |              |
|             |                          |                 | CENTER -    |              |
|             |                          |                 | LABORATORY  |              |
+-------------+--------------------------+-----------------+-------------+--------------+
| eGFR if not | >60Comment: GLOMERULAR   | >=60            | [facility]  |              |
|      | FILTRATION               | mL/min/1.73m2   | [facility]    |              |
| [employer]    | RATE,ESTIMATED           |                 | MEDICAL     |              |
|             |   mL/min/1.01i8Yaaw than |                 | CENTER -    |              |
|             |  60    Chronic kidney    |                 | LABORATORY  |              |
|             | disease,if found over a  |                 |             |              |
|             | 3-month period.Less than |                 |             |              |
|             |  15    Kidney failureFor |                 |             |              |
|             |                   |                 |             |              |
|             | [employer],multiply the   |                 |             |              |
|             | calculated GFR by 1.21.  |                 |             |              |
|             |                          |                 |             |              |
+-------------+--------------------------+-----------------+-------------+--------------+
| Calcium     | 7.5 (L)                  | 8.7 - 10.4      | [facility]  |              |
|             |                          | mg/dL           | [facility]    |              |
|             |                          |                 | [facility]     |              |
|             |                          |                 | CENTER -    |              |
|             |                          |                 | LABORATORY  |              |
+-------------+--------------------------+-----------------+-------------+--------------+
| Albumin     | 2.0 (L)                  | 3.2 - 4.8 g/dL  | [facility]  |              |
|             |                          |                 | [facility]    |              |
|             |                          |                 | [facility]     |              |
|             |                          |                 | CENTER -    |              |
|             |                          |                 | LABORATORY  |              |
+-------------+--------------------------+-----------------+-------------+--------------+
| Bilirubin   | 0.4                      | 0.3 - 1.2 mg/dL | [facility]  |              |
| Total       |                          |                 | [facility]    |              |
|             |                          |                 | [facility]     |              |
|             |                          |                 | CENTER -    |              |
|             |                          |                 | LABORATORY  |              |
+-------------+--------------------------+-----------------+-------------+--------------+
| Total       | 5.7                      | 5.7 - 8.2 g/dL  | [facility]  |              |
| Protein     |                          |                 | [facility]    |              |
|             |                          |                 | [facility]     |              |
|             |                          |                 | CENTER -    |              |
|             |                          |                 | LABORATORY  |              |
+-------------+--------------------------+-----------------+-------------+--------------+
 
| AST         | 55 (H)                   | 0 - 34 U/L      | [facility]  |              |
|             |                          |                 | [facility]    |              |
|             |                          |                 | [facility]     |              |
|             |                          |                 | CENTER -    |              |
|             |                          |                 | LABORATORY  |              |
+-------------+--------------------------+-----------------+-------------+--------------+
| ALT         | 30                       | 10 - 49 U/L     | [facility]  |              |
|             |                          |                 | [facility]    |              |
|             |                          |                 | [facility]     |              |
|             |                          |                 | CENTER -    |              |
|             |                          |                 | LABORATORY  |              |
+-------------+--------------------------+-----------------+-------------+--------------+
| Alkaline    | 130 (H)                  | 46 - 116 U/L    | [facility]  |              |
| Phosphatase |                          |                 | [facility]    |              |
|             |                          |                 | [facility]     |              |
|             |                          |                 | CENTER -    |              |
|             |                          |                 | LABORATORY  |              |
+-------------+--------------------------+-----------------+-------------+--------------+
| Globulin    | 3.7                      | 2.1 - 3.8 g/dL  | [facility]  |              |
|             |                          |                 | [facility]    |              |
|             |                          |                 | [facility]     |              |
|             |                          |                 | CENTER -    |              |
|             |                          |                 | LABORATORY  |              |
+-------------+--------------------------+-----------------+-------------+--------------+
| Albumin/Kassy | 0.5 (L)                  | 0.8 - 1.9       | [facility]  |              |
| bulin Ratio |                          |                 | [facility]    |              |
|             |                          |                 | [facility]     |              |
|             |                          |                 | CENTER -    |              |
|             |                          |                 | LABORATORY  |              |
+-------------+--------------------------+-----------------+-------------+--------------+
| BUN/Creatin | 18.0                     |                 | [facility]  |              |
| ine Ratio   |                          |                 | [facility]    |              |
|             |                          |                 | [facility]     |              |
|             |                          |                 | CENTER -    |              |
|             |                          |                 | LABORATORY  |              |
+-------------+--------------------------+-----------------+-------------+--------------+
 
 
 
+----------+
| Specimen |
+----------+
| Blood    |
+----------+
 
 
 
+----------------------+--------------------+--------------------+----------------+
| Performing           | Address            | City/State/Zipcode | Phone Number   |
| Organization         |                    |                    |                |
+----------------------+--------------------+--------------------+----------------+
|   [facility]     |   [address] |   [place]  |   [phone] |
| [facility]  |                    | [id number]              |                |
| - LABORATORY         |                    |                    |                |
+----------------------+--------------------+--------------------+----------------+
 CBC with Differential (02/10/2020  4:53 AM PST)
 
+-------------+--------------------------+-----------------+-------------+--------------+
| Component   | Value                    | Ref Range       | Performed   | Pathologist  |
|             |                          |                 | At          | Signature    |
 
+-------------+--------------------------+-----------------+-------------+--------------+
| WBC         | 1.9 (LL)Comment:         | 4.0 - 11.0 K/uL | [facility]  |              |
|             | Consistent with previous |                 | [facility]    |              |
|             |  results.                |                 | MEDICAL     |              |
|             |                          |                 | CENTER -    |              |
|             |                          |                 | LABORATORY  |              |
+-------------+--------------------------+-----------------+-------------+--------------+
| RBC         | 3.11 (L)                 | 4.30 - 5.70     | [facility]  |              |
|             |                          | M/uL            | [facility]    |              |
|             |                          |                 | MED[facility]     |              |
|             |                          |                 | CENTER -    |              |
|             |                          |                 | LABORATORY  |              |
+-------------+--------------------------+-----------------+-------------+--------------+
| Hemoglobin  | 9.7 (L)                  | 13.5 - 18.0     | [facility]  |              |
|             |                          | g/dL            | [facility]    |              |
|             |                          |                 | MEDICAL     |              |
|             |                          |                 | CENTER -    |              |
|             |                          |                 | LABORATORY  |              |
+-------------+--------------------------+-----------------+-------------+--------------+
| Hematocrit  | 28.8 (L)                 | 40.0 - 51.0 %   | [facility]  |              |
|             |                          |                 | [facility]    |              |
|             |                          |                 | [facility]     |              |
|             |                          |                 | CENTER -    |              |
|             |                          |                 | LABORATORY  |              |
+-------------+--------------------------+-----------------+-------------+--------------+
| MCV         | 92.6                     | 83.0 - 101.0 fL | [facility]  |              |
|             |                          |                 | [facility]    |              |
|             |                          |                 | [facility]     |              |
|             |                          |                 | CENTER -    |              |
|             |                          |                 | LABORATORY  |              |
+-------------+--------------------------+-----------------+-------------+--------------+
| MCH         | 31.2                     | 28.0 - 35.0 pg  | [facility]  |              |
|             |                          |                 | [facility]    |              |
|             |                          |                 | MEDICAL     |              |
|             |                          |                 | CENTER -    |              |
|             |                          |                 | LABORATORY  |              |
+-------------+--------------------------+-----------------+-------------+--------------+
| MCHC        | 33.7                     | 32.0 - 36.0     | [facility]  |              |
|             |                          | g/dL            | [facility]    |              |
|             |                          |                 | MEDICAL     |              |
|             |                          |                 | CENTER -    |              |
|             |                          |                 | LABORATORY  |              |
+-------------+--------------------------+-----------------+-------------+--------------+
| RDW-CV      | 13.2                     | <15.0 %         | [facility]  |              |
|             |                          |                 | [facility]    |              |
|             |                          |                 | MEDICAL     |              |
|             |                          |                 | CENTER -    |              |
|             |                          |                 | LABORATORY  |              |
+-------------+--------------------------+-----------------+-------------+--------------+
| RDW-SD      | 44.5                     | 35.1 - 46.3 fL  | [facility]  |              |
|             |                          |                 | [facility]    |              |
|             |                          |                 | MEDICAL     |              |
|             |                          |                 | CENTER -    |              |
|             |                          |                 | LABORATORY  |              |
+-------------+--------------------------+-----------------+-------------+--------------+
| Platelet    | 103 (L)                  | 140 - 440 K/uL  | [facility]  |              |
| Count       |                          |                 | [facility]    |              |
|             |                          |                 | MEDICAL     |              |
|             |                          |                 | CENTER -    |              |
|             |                          |                 | LABORATORY  |              |
 
+-------------+--------------------------+-----------------+-------------+--------------+
| MPV         | 10.3                     | 6.5 - 12.4 fL   | [facility]  |              |
|             |                          |                 | [facility]    |              |
|             |                          |                 | MEDICAL     |              |
|             |                          |                 | CENTER -    |              |
|             |                          |                 | LABORATORY  |              |
+-------------+--------------------------+-----------------+-------------+--------------+
| Immature    | 1.6Comment: Low PLT +    | 0.9 - 11.2 %    | [facility]  |              |
| Platelet    | Low IPF are consistent   |                 | [facility]    |              |
| Fraction    | with a production        |                 | MEDICAL     |              |
|             | disorder. Low PLT + High |                 | CENTER -    |              |
|             |  IPF are consistent with |                 | LABORATORY  |              |
|             |  increased platelet      |                 |             |              |
|             | destruction.             |                 |             |              |
+-------------+--------------------------+-----------------+-------------+--------------+
| %           | 49.8                     | 45.0 - 82.0 %   | [facility]  |              |
| Neutrophils |                          |                 | [facility]    |              |
|             |                          |                 | MEDICAL     |              |
|             |                          |                 | CENTER -    |              |
|             |                          |                 | LABORATORY  |              |
+-------------+--------------------------+-----------------+-------------+--------------+
| %           | 38.2                     | 20.0 - 45.0 %   | [facility]  |              |
| Lymphocytes |                          |                 | [facility]    |              |
|             |                          |                 | MEDICAL     |              |
|             |                          |                 | CENTER -    |              |
|             |                          |                 | LABORATORY  |              |
+-------------+--------------------------+-----------------+-------------+--------------+
| % Monocytes | 8.9                      | 4.0 - 12.0 %    | [facility]  |              |
|             |                          |                 | [facility]    |              |
|             |                          |                 | MEDICAL     |              |
|             |                          |                 | CENTER -    |              |
|             |                          |                 | LABORATORY  |              |
+-------------+--------------------------+-----------------+-------------+--------------+
| %           | 2.1                      | 0.0 - 5.0 %     | [facility]  |              |
| Eosinophils |                          |                 | [facility]    |              |
|             |                          |                 | MEDICAL     |              |
|             |                          |                 | CENTER -    |              |
|             |                          |                 | LABORATORY  |              |
+-------------+--------------------------+-----------------+-------------+--------------+
| % Basophils | 0.5                      | 0.0 - 1.0 %     | [facility]  |              |
|             |                          |                 | [facility]    |              |
|             |                          |                 | MEDICAL     |              |
|             |                          |                 | CENTER -    |              |
|             |                          |                 | LABORATORY  |              |
+-------------+--------------------------+-----------------+-------------+--------------+
| % Immature  | 0.5 (H)Comment:          | 0.0 - 0.4 %     | [facility]  |              |
| Granulocyte | Preliminary studies have |                 | [facility]    |              |
| s           |  indicated the IG%       |                 | MEDICAL     |              |
|             | and/or IG# show promise  |                 | CENTER -    |              |
|             | as an early indicator    |                 | LABORATORY  |              |
|             | for infection.           |                 |             |              |
+-------------+--------------------------+-----------------+-------------+--------------+
| Absolute    | 0.95 (L)                 | 1.80 - 8.50     | [facility]  |              |
| Neutrophils |                          | K/uL            | [facility]    |              |
|             |                          |                 | MEDICAL     |              |
|             |                          |                 | CENTER -    |              |
|             |                          |                 | LABORATORY  |              |
+-------------+--------------------------+-----------------+-------------+--------------+
| Absolute    | 0.73                     | 0.60 - 3.20     | [facility]  |              |
| Lymphocytes |                          | K/uL            | [facility]    |              |
 
|             |                          |                 | MEDICAL     |              |
|             |                          |                 | CENTER -    |              |
|             |                          |                 | LABORATORY  |              |
+-------------+--------------------------+-----------------+-------------+--------------+
| Absolute    | 0.17                     | 0.00 - 1.00     | [facility]  |              |
| Monocytes   |                          | K/uL            | [facility]    |              |
|             |                          |                 | MEDICAL     |              |
|             |                          |                 | CENTER -    |              |
|             |                          |                 | LABORATORY  |              |
+-------------+--------------------------+-----------------+-------------+--------------+
| Absolute    | 0.04                     | 0.00 - 0.40     | [facility]  |              |
| Eosinophils |                          | K/uL            | [facility]    |              |
|             |                          |                 | [facility]     |              |
|             |                          |                 | CENTER -    |              |
|             |                          |                 | LABORATORY  |              |
+-------------+--------------------------+-----------------+-------------+--------------+
| Absolute    | 0.01                     | 0.00 - 0.10     | [facility]  |              |
| Basophils   |                          | K/uL            | [facility]    |              |
|             |                          |                 | MEDICAL     |              |
|             |                          |                 | CENTER -    |              |
|             |                          |                 | LABORATORY  |              |
+-------------+--------------------------+-----------------+-------------+--------------+
| Absolute    | 0.01                     | 0.00 - 0.03     | [facility]  |              |
| Immature    |                          | K/uL            | [facility]    |              |
| Granulocyte |                          |                 | MEDICAL     |              |
| s           |                          |                 | CENTER -    |              |
|             |                          |                 | LABORATORY  |              |
+-------------+--------------------------+-----------------+-------------+--------------+
| % nRBC      | 0                        | 0 - 2 per 100   | [facility]  |              |
|             |                          | WBCs            | [facility]    |              |
|             |                          |                 | MEDICAL     |              |
|             |                          |                 | CENTER -    |              |
|             |                          |                 | LABORATORY  |              |
+-------------+--------------------------+-----------------+-------------+--------------+
| Absolute    | 0.00                     | 0.00 - 0.01     | [facility]  |              |
| nRBC        |                          | K/uL            | [facility]    |              |
|             |                          |                 | MEDICAL     |              |
|             |                          |                 | CENTER -    |              |
|             |                          |                 | LABORATORY  |              |
+-------------+--------------------------+-----------------+-------------+--------------+
 
 
 
+----------+
| Specimen |
+----------+
| Blood    |
+----------+
 
 
 
+----------------------+--------------------+--------------------+----------------+
| Performing           | Address            | City/State/Zipcode | Phone Number   |
| Organization         |                    |                    |                |
+----------------------+--------------------+--------------------+----------------+
|   [facility]     |   [address] |   [place]  |   [phone] |
| [facility]  |                    | [address]              |                |
| - LABORATORY         |                    |                    |                |
+----------------------+--------------------+--------------------+----------------+
 POC Glucose (2020  8:26 PM PST)
 
 
+-----------+---------+----------------+-------------+--------------+
| Component | Value   | Ref Range      | Performed   | Pathologist  |
|           |         |                | At          | Signature    |
+-----------+---------+----------------+-------------+--------------+
| Glucose,  | 184 (H) | 70 - 109 mg/dL | [facility]  |              |
| POC       |         |                | [facility]    |              |
|           |         |                | [facility]     |              |
|           |         |                | CENTER -    |              |
|           |         |                | LABORATORY  |              |
+-----------+---------+----------------+-------------+--------------+
 
 
 
+----------+
| Specimen |
+----------+
| Blood    |
+----------+
 
 
 
+----------------------+--------------------+--------------------+----------------+
| Performing           | Address            | City/State/Zipcode | Phone Number   |
| Organization         |                    |                    |                |
+----------------------+--------------------+--------------------+----------------+
|   [facility]     |   [address] |   [place]  |   [phone] |
| [facility]  |                    | [address]              |                |
| - LABORATORY         |                    |                    |                |
+----------------------+--------------------+--------------------+----------------+
 POC Glucose (2020  5:03 PM PST)
 
+-----------+---------+----------------+-------------+--------------+
| Component | Value   | Ref Range      | Performed   | Pathologist  |
|           |         |                | At          | Signature    |
+-----------+---------+----------------+-------------+--------------+
| Glucose,  | 153 (H) | 70 - 109 mg/dL | [facility]  |              |
| POC       |         |                | [facility]    |              |
|           |         |                | MEDICAL     |              |
|           |         |                | CENTER -    |              |
|           |         |                | LABORATORY  |              |
+-----------+---------+----------------+-------------+--------------+
 
 
 
+----------+
| Specimen |
+----------+
| Blood    |
+----------+
 
 
 
+----------------------+--------------------+--------------------+----------------+
| Performing           | Address            | City/State/Zipcode | Phone Number   |
| Organization         |                    |                    |                |
+----------------------+--------------------+--------------------+----------------+
|   [facility]     |   [address] |   [place]  |   [phone] |
| [facility]  |                    | [address]              |                |
| - LABORATORY         |                    |                    |                |
 
+----------------------+--------------------+--------------------+----------------+
 POC Glucose (2020 12:10 PM PST)
 
+-----------+---------+----------------+-------------+--------------+
| Component | Value   | Ref Range      | Performed   | Pathologist  |
|           |         |                | At          | Signature    |
+-----------+---------+----------------+-------------+--------------+
| Glucose,  | 114 (H) | 70 - 109 mg/dL | [facility]  |              |
| POC       |         |                | [facility]    |              |
|           |         |                | MEDICAL     |              |
|           |         |                | CENTER -    |              |
|           |         |                | LABORATORY  |              |
+-----------+---------+----------------+-------------+--------------+
 
 
 
+----------+
| Specimen |
+----------+
| Blood    |
+----------+
 
 
 
+----------------------+--------------------+--------------------+----------------+
| Performing           | Address            | City/State/Zipcode | Phone Number   |
| Organization         |                    |                    |                |
+----------------------+--------------------+--------------------+----------------+
|   [facility]     |   [address] |   [place]  |   [phone] |
| [facility]  |                    | [address]              |                |
| - LABORATORY         |                    |                    |                |
+----------------------+--------------------+--------------------+----------------+
 POC Glucose (2020  6:48 AM PST)
 
+-----------+---------+----------------+-------------+--------------+
| Component | Value   | Ref Range      | Performed   | Pathologist  |
|           |         |                | At          | Signature    |
+-----------+---------+----------------+-------------+--------------+
| Glucose,  | 118 (H) | 70 - 109 mg/dL | [facility]  |              |
| POC       |         |                | [facility]    |              |
|           |         |                | MEDICAL     |              |
|           |         |                | CENTER -    |              |
|           |         |                | LABORATORY  |              |
+-----------+---------+----------------+-------------+--------------+
 
 
 
+----------+
| Specimen |
+----------+
| Blood    |
+----------+
 
 
 
+----------------------+--------------------+--------------------+----------------+
| Performing           | Address            | City/State/Zipcode | Phone Number   |
| Organization         |                    |                    |                |
+----------------------+--------------------+--------------------+----------------+
|   [facility]     |   [address] |   [place]  |   [phone] |
 
| [facility]  |                    | [address]              |                |
| - LABORATORY         |                    |                    |                |
+----------------------+--------------------+--------------------+----------------+
 Magnesium (2020  5:01 AM PST)
 
+-----------+-------+-----------------+-------------+--------------+
| Component | Value | Ref Range       | Performed   | Pathologist  |
|           |       |                 | At          | Signature    |
+-----------+-------+-----------------+-------------+--------------+
| Magnesium | 1.7   | 1.6 - 2.6 mg/dL | [facility]  |              |
|           |       |                 | [facility]    |              |
|           |       |                 | MEDICAL     |              |
|           |       |                 | CENTER -    |              |
|           |       |                 | LABORATORY  |              |
+-----------+-------+-----------------+-------------+--------------+
 
 
 
+----------+
| Specimen |
+----------+
| Blood    |
+----------+
 
 
 
+----------------------+--------------------+--------------------+----------------+
| Performing           | Address            | City/State/Zipcode | Phone Number   |
| Organization         |                    |                    |                |
+----------------------+--------------------+--------------------+----------------+
|   [facility]     |   [address] |   [place]  |   [phone] |
| [facility]  |                    | [address]              |                |
| - LABORATORY         |                    |                    |                |
+----------------------+--------------------+--------------------+----------------+
 Comprehensive Metabolic Panel (2020  5:01 AM PST)
 
+-------------+--------------------------+-----------------+-------------+--------------+
| Component   | Value                    | Ref Range       | Performed   | Pathologist  |
|             |                          |                 | At          | Signature    |
+-------------+--------------------------+-----------------+-------------+--------------+
| Na          | 136                      | 136 - 145       | [facility]  |              |
|             |                          | mmol/L          | [facility]    |              |
|             |                          |                 | MEDICAL     |              |
|             |                          |                 | CENTER -    |              |
|             |                          |                 | LABORATORY  |              |
+-------------+--------------------------+-----------------+-------------+--------------+
| K           | 4.9                      | 3.4 - 5.1       | [facility]  |              |
|             |                          | mmol/L          | [facility]    |              |
|             |                          |                 | MEDICAL     |              |
|             |                          |                 | CENTER -    |              |
|             |                          |                 | LABORATORY  |              |
+-------------+--------------------------+-----------------+-------------+--------------+
| Cl          | 107                      | 98 - 107 mmol/L | [facility]  |              |
|             |                          |                 | [facility]    |              |
|             |                          |                 | MEDICAL     |              |
|             |                          |                 | CENTER -    |              |
|             |                          |                 | LABORATORY  |              |
+-------------+--------------------------+-----------------+-------------+--------------+
| CO2         | 25                       | 20 - 31 mmol/L  | [facility]  |              |
|             |                          |                 | [facility]    |              |
 
|             |                          |                 | MEDICAL     |              |
|             |                          |                 | CENTER -    |              |
|             |                          |                 | LABORATORY  |              |
+-------------+--------------------------+-----------------+-------------+--------------+
| Anion Gap   | 4                        | 3 - 16 mmol/L   | [facility]  |              |
|             |                          |                 | [facility]    |              |
|             |                          |                 | MEDICAL     |              |
|             |                          |                 | CENTER -    |              |
|             |                          |                 | LABORATORY  |              |
+-------------+--------------------------+-----------------+-------------+--------------+
| Glucose     | 134 (H)                  | 60 - 106 mg/dL  | [facility]  |              |
|             |                          |                 | [facility]    |              |
|             |                          |                 | MEDICAL     |              |
|             |                          |                 | CENTER -    |              |
|             |                          |                 | LABORATORY  |              |
+-------------+--------------------------+-----------------+-------------+--------------+
| BUN         | 19                       | 9 - 23 mg/dL    | [facility]  |              |
|             |                          |                 | [facility]    |              |
|             |                          |                 | MEDICAL     |              |
|             |                          |                 | CENTER -    |              |
|             |                          |                 | LABORATORY  |              |
+-------------+--------------------------+-----------------+-------------+--------------+
| Creatinine  | 1.04                     | 0.70 - 1.30     | [facility]  |              |
|             |                          | mg/dL           | [facility]    |              |
|             |                          |                 | MEDICAL     |              |
|             |                          |                 | CENTER -    |              |
|             |                          |                 | LABORATORY  |              |
+-------------+--------------------------+-----------------+-------------+--------------+
| eGFR if not | >60Comment: GLOMERULAR   | >=60            | [facility]  |              |
|      | FILTRATION               | mL/min/1.73m2   | [facility]    |              |
| [employer]    | RATE,ESTIMATED           |                 | MEDICAL     |              |
|             |   mL/min/1.18x5Gynh than |                 | CENTER -    |              |
|             |  60    Chronic kidney    |                 | LABORATORY  |              |
|             | disease,if found over a  |                 |             |              |
|             | 3-month period.Less than |                 |             |              |
|             |  15    Kidney failureFor |                 |             |              |
|             |                   |                 |             |              |
|             | Americans,multiply the   |                 |             |              |
|             | calculated GFR by 1.21.  |                 |             |              |
|             |                          |                 |             |              |
+-------------+--------------------------+-----------------+-------------+--------------+
| Calcium     | 7.4 (L)                  | 8.7 - 10.4      | [facility]  |              |
|             |                          | mg/dL           | [facility]    |              |
|             |                          |                 | MEDICAL     |              |
|             |                          |                 | CENTER -    |              |
|             |                          |                 | LABORATORY  |              |
+-------------+--------------------------+-----------------+-------------+--------------+
| Albumin     | 1.8 (L)                  | 3.2 - 4.8 g/dL  | [facility]  |              |
|             |                          |                 | [facility]    |              |
|             |                          |                 | MEDICAL     |              |
|             |                          |                 | CENTER -    |              |
|             |                          |                 | LABORATORY  |              |
+-------------+--------------------------+-----------------+-------------+--------------+
| Bilirubin   | 0.4                      | 0.3 - 1.2 mg/dL | [facility]  |              |
| Total       |                          |                 | [facility]    |              |
|             |                          |                 | MEDICAL     |              |
|             |                          |                 | CENTER -    |              |
|             |                          |                 | LABORATORY  |              |
+-------------+--------------------------+-----------------+-------------+--------------+
| Total       | 5.2 (L)                  | 5.7 - 8.2 g/dL  | [facility]  |              |
 
| Protein     |                          |                 | [facility]    |              |
|             |                          |                 | MEDICAL     |              |
|             |                          |                 | CENTER -    |              |
|             |                          |                 | LABORATORY  |              |
+-------------+--------------------------+-----------------+-------------+--------------+
| AST         | 50 (H)                   | 0 - 34 U/L      | [facility]  |              |
|             |                          |                 | [facility]    |              |
|             |                          |                 | MEDICAL     |              |
|             |                          |                 | CENTER -    |              |
|             |                          |                 | LABORATORY  |              |
+-------------+--------------------------+-----------------+-------------+--------------+
| ALT         | 25                       | 10 - 49 U/L     | [facility]  |              |
|             |                          |                 | [facility]    |              |
|             |                          |                 | MEDICAL     |              |
|             |                          |                 | CENTER -    |              |
|             |                          |                 | LABORATORY  |              |
+-------------+--------------------------+-----------------+-------------+--------------+
| Alkaline    | 113                      | 46 - 116 U/L    | [facility]  |              |
| Phosphatase |                          |                 | [facility]    |              |
|             |                          |                 | MEDICAL     |              |
|             |                          |                 | CENTER -    |              |
|             |                          |                 | LABORATORY  |              |
+-------------+--------------------------+-----------------+-------------+--------------+
| Globulin    | 3.4                      | 2.1 - 3.8 g/dL  | [facility]  |              |
|             |                          |                 | [facility]    |              |
|             |                          |                 | MEDICAL     |              |
|             |                          |                 | CENTER -    |              |
|             |                          |                 | LABORATORY  |              |
+-------------+--------------------------+-----------------+-------------+--------------+
| Albumin/Kassy | 0.5 (L)                  | 0.8 - 1.9       | [facility]  |              |
| bulin Ratio |                          |                 | [facility]    |              |
|             |                          |                 | MEDICAL     |              |
|             |                          |                 | CENTER -    |              |
|             |                          |                 | LABORATORY  |              |
+-------------+--------------------------+-----------------+-------------+--------------+
| BUN/Creatin | 18.3                     |                 | [facility]  |              |
| ine Ratio   |                          |                 | [facility]    |              |
|             |                          |                 | MEDICAL     |              |
|             |                          |                 | CENTER -    |              |
|             |                          |                 | LABORATORY  |              |
+-------------+--------------------------+-----------------+-------------+--------------+
 
 
 
+----------+
| Specimen |
+----------+
| Blood    |
+----------+
 
 
 
+----------------------+--------------------+--------------------+----------------+
| Performing           | Address            | City/State/Zipcode | Phone Number   |
| Organization         |                    |                    |                |
+----------------------+--------------------+--------------------+----------------+
|   [facility]     |   [address] |   [place]  |   [phone] |
| [facility]  |                    | [address]              |                |
| - LABORATORY         |                    |                    |                |
+----------------------+--------------------+--------------------+----------------+
 
 CBC with Differential (2020  5:01 AM PST)
 
+-------------+--------------------------+-----------------+-------------+--------------+
| Component   | Value                    | Ref Range       | Performed   | Pathologist  |
|             |                          |                 | At          | Signature    |
+-------------+--------------------------+-----------------+-------------+--------------+
| WBC         | 1.8 (LL)Comment:         | 4.0 - 11.0 K/uL | [facility]  |              |
|             | Critical Result called   |                 | [facility]    |              |
|             | to and read back by Marianna |                 | MEDICAL     |              |
|             |  HATTIE Joseph on 2020 at  |                 | CENTER -    |              |
|             | 5:55 AM by Mark BARROSO     |                 | LABORATORY  |              |
|             | [place].                    |                 |             |              |
+-------------+--------------------------+-----------------+-------------+--------------+
| RBC         | 3.04 (L)                 | 4.30 - 5.70     | [facility]  |              |
|             |                          | M/uL            | [facility]    |              |
|             |                          |                 | [facility]     |              |
|             |                          |                 | [facility] -    |              |
|             |                          |                 | LABORATORY  |              |
+-------------+--------------------------+-----------------+-------------+--------------+
| Hemoglobin  | 9.5 (L)                  | 13.5 - 18.0     | [facility]  |              |
|             |                          | g/dL            | [facility]    |              |
|             |                          |                 | [facility]     |              |
|             |                          |                 | CENTER -    |              |
|             |                          |                 | LABORATORY  |              |
+-------------+--------------------------+-----------------+-------------+--------------+
| Hematocrit  | 28.5 (L)                 | 40.0 - 51.0 %   | [facility]  |              |
|             |                          |                 | [facility]    |              |
|             |                          |                 | [facility]     |              |
|             |                          |                 | CENTER -    |              |
|             |                          |                 | LABORATORY  |              |
+-------------+--------------------------+-----------------+-------------+--------------+
| MCV         | 93.8                     | 83.0 - 101.0 fL | [facility]  |              |
|             |                          |                 | [facility]    |              |
|             |                          |                 | [facility]     |              |
|             |                          |                 | C[facility] -    |              |
|             |                          |                 | LABORATORY  |              |
+-------------+--------------------------+-----------------+-------------+--------------+
| MCH         | 31.3                     | 28.0 - 35.0 pg  | [facility]  |              |
|             |                          |                 | [facility]    |              |
|             |                          |                 | [facility]     |              |
|             |                          |                 | CENTER -    |              |
|             |                          |                 | LABORATORY  |              |
+-------------+--------------------------+-----------------+-------------+--------------+
| MCHC        | 33.3                     | 32.0 - 36.0     | [facility]  |              |
|             |                          | g/dL            | [facility]    |              |
|             |                          |                 | [facility]     |              |
|             |                          |                 | CENTER -    |              |
|             |                          |                 | LABORATORY  |              |
+-------------+--------------------------+-----------------+-------------+--------------+
| RDW-CV      | 13.3                     | <15.0 %         | [facility]  |              |
|             |                          |                 | [facility]    |              |
|             |                          |                 | [facility]     |              |
|             |                          |                 | CENTER -    |              |
|             |                          |                 | LABORATORY  |              |
+-------------+--------------------------+-----------------+-------------+--------------+
| RDW-SD      | 45.9                     | 35.1 - 46.3 fL  | [facility]  |              |
|             |                          |                 | [facility]    |              |
|             |                          |                 | [facility]     |              |
|             |                          |                 | CENTER -    |              |
|             |                          |                 | LABORATORY  |              |
 
+-------------+--------------------------+-----------------+-------------+--------------+
| Platelet    | 96 (L)                   | 140 - 440 K/uL  | [facility]  |              |
| Count       |                          |                 | [facility]    |              |
|             |                          |                 | [facility]     |              |
|             |                          |                 | CENTER -    |              |
|             |                          |                 | LABORATORY  |              |
+-------------+--------------------------+-----------------+-------------+--------------+
| MPV         | 10.5                     | 6.5 - 12.4 fL   | [facility]  |              |
|             |                          |                 | [facility]    |              |
|             |                          |                 | [facility]     |              |
|             |                          |                 | CENTER -    |              |
|             |                          |                 | LABORATORY  |              |
+-------------+--------------------------+-----------------+-------------+--------------+
| Immature    | 1.7Comment: Low PLT +    | 0.9 - 11.2 %    | [facility]  |              |
| Platelet    | Low IPF are consistent   |                 | [facility]    |              |
| Fraction    | with a production        |                 | MEDICAL     |              |
|             | disorder. Low PLT + High |                 | CENTER -    |              |
|             |  IPF are consistent with |                 | LABORATORY  |              |
|             |  increased platelet      |                 |             |              |
|             | destruction.             |                 |             |              |
+-------------+--------------------------+-----------------+-------------+--------------+
| %           | 52.5                     | 45.0 - 82.0 %   | [facility]  |              |
| Neutrophils |                          |                 | [facility]    |              |
|             |                          |                 | [facility]     |              |
|             |                          |                 | CENTER -    |              |
|             |                          |                 | LABORATORY  |              |
+-------------+--------------------------+-----------------+-------------+--------------+
| %           | 37.3                     | 20.0 - 45.0 %   | [facility]  |              |
| Lymphocytes |                          |                 | [facility]    |              |
|             |                          |                 | [facility]     |              |
|             |                          |                 | CENTER -    |              |
|             |                          |                 | LABORATORY  |              |
+-------------+--------------------------+-----------------+-------------+--------------+
| % Monocytes | 7.9                      | 4.0 - 12.0 %    | [facility]  |              |
|             |                          |                 | [facility]    |              |
|             |                          |                 | [facility]     |              |
|             |                          |                 | CENTER -    |              |
|             |                          |                 | LABORATORY  |              |
+-------------+--------------------------+-----------------+-------------+--------------+
| %           | 1.7                      | 0.0 - 5.0 %     | [facility]  |              |
| Eosinophils |                          |                 | [facility]    |              |
|             |                          |                 | MEDICAL     |              |
|             |                          |                 | CENTER -    |              |
|             |                          |                 | LABORATORY  |              |
+-------------+--------------------------+-----------------+-------------+--------------+
| % Basophils | 0.6                      | 0.0 - 1.0 %     | [facility]  |              |
|             |                          |                 | [facility]    |              |
|             |                          |                 | [facility]     |              |
|             |                          |                 | CENTER -    |              |
|             |                          |                 | LABORATORY  |              |
+-------------+--------------------------+-----------------+-------------+--------------+
| % Immature  | 0.0                      | 0.0 - 0.4 %     | [facility]  |              |
| Granulocyte |                          |                 | [facility]    |              |
| s           |                          |                 | MEDICAL     |              |
|             |                          |                 | CENTER -    |              |
|             |                          |                 | LABORATORY  |              |
+-------------+--------------------------+-----------------+-------------+--------------+
| Absolute    | 0.93 (L)                 | 1.80 - 8.50     | [facility]  |              |
| Neutrophils |                          | K/uL            | [facility]    |              |
|             |                          |                 | [facility]     |              |
 
|             |                          |                 | CENTER -    |              |
|             |                          |                 | LABORATORY  |              |
+-------------+--------------------------+-----------------+-------------+--------------+
| Absolute    | 0.66                     | 0.60 - 3.20     | [facility]  |              |
| Lymphocytes |                          | K/uL            | [facility]    |              |
|             |                          |                 | [facility]     |              |
|             |                          |                 | CENTER -    |              |
|             |                          |                 | LABORATORY  |              |
+-------------+--------------------------+-----------------+-------------+--------------+
| Absolute    | 0.14                     | 0.00 - 1.00     | [facility]  |              |
| Monocytes   |                          | K/uL            | [facility]    |              |
|             |                          |                 | [facility]     |              |
|             |                          |                 | CENTER -    |              |
|             |                          |                 | LABORATORY  |              |
+-------------+--------------------------+-----------------+-------------+--------------+
| Absolute    | 0.03                     | 0.00 - 0.40     | [facility]  |              |
| Eosinophils |                          | K/uL            | [facility]    |              |
|             |                          |                 | [facility]     |              |
|             |                          |                 | CENTER -    |              |
|             |                          |                 | LABORATORY  |              |
+-------------+--------------------------+-----------------+-------------+--------------+
| Absolute    | 0.01                     | 0.00 - 0.10     | [facility]  |              |
| Basophils   |                          | K/uL            | [facility]    |              |
|             |                          |                 | [facility]     |              |
|             |                          |                 | CENTER -    |              |
|             |                          |                 | LABORATORY  |              |
+-------------+--------------------------+-----------------+-------------+--------------+
| Absolute    | 0.00                     | 0.00 - 0.03     | [facility]  |              |
| Immature    |                          | K/uL            | [facility]    |              |
| Granulocyte |                          |                 | MEDICAL     |              |
| s           |                          |                 | CENTER -    |              |
|             |                          |                 | LABORATORY  |              |
+-------------+--------------------------+-----------------+-------------+--------------+
| % nRBC      | 0                        | 0 - 2 per 100   | [facility]  |              |
|             |                          | WBCs            | [facility]    |              |
|             |                          |                 | [facility]ICAL     |              |
|             |                          |                 | CENTER -    |              |
|             |                          |                 | LABORATORY  |              |
+-------------+--------------------------+-----------------+-------------+--------------+
| Absolute    | 0.00                     | 0.00 - 0.01     | [facility]  |              |
| nRBC        |                          | K/uL            | [facility]    |              |
|             |                          |                 | [facility]     |              |
|             |                          |                 | CENTER -    |              |
|             |                          |                 | LABORATORY  |              |
+-------------+--------------------------+-----------------+-------------+--------------+
 
 
 
+----------+
| Specimen |
+----------+
| Blood    |
+----------+
 
 
 
+----------------------+--------------------+--------------------+----------------+
| Performing           | Address            | City/State/Zipcode | Phone Number   |
| Organization         |                    |                    |                |
+----------------------+--------------------+--------------------+----------------+
 
|   [facility]     |   [address] |   [place]  |   [phone] |
| [facility]  |                    | [address]              |                |
| - LABORATORY         |                    |                    |                |
+----------------------+--------------------+--------------------+----------------+
 POC Glucose (2020  4:44 PM PST)
 
+-----------+---------+----------------+-------------+--------------+
| Component | Value   | Ref Range      | Performed   | Pathologist  |
|           |         |                | At          | Signature    |
+-----------+---------+----------------+-------------+--------------+
| Glucose,  | 121 (H) | 70 - 109 mg/dL | [facility]  |              |
| POC       |         |                | [facility]    |              |
|           |         |                | [facility]     |              |
|           |         |                | CENTER -    |              |
|           |         |                | LABORATORY  |              |
+-----------+---------+----------------+-------------+--------------+
 
 
 
+----------+
| Specimen |
+----------+
| Blood    |
+----------+
 
 
 
+----------------------+--------------------+--------------------+----------------+
| Performing           | Address            | City/State/Zipcode | Phone Number   |
| Organization         |                    |                    |                |
+----------------------+--------------------+--------------------+----------------+
|   [facility]     |   [address] |   [place]  |   [phone] |
| [facility]  |                    | [address]              |                |
| - LABORATORY         |                    |                    |                |
+----------------------+--------------------+--------------------+----------------+
 POC Glucose (2020 12:13 PM PST)
 
+-----------+---------+----------------+-------------+--------------+
| Component | Value   | Ref Range      | Performed   | Pathologist  |
|           |         |                | At          | Signature    |
+-----------+---------+----------------+-------------+--------------+
| Glucose,  | 116 (H) | 70 - 109 mg/dL | [facility]  |              |
| POC       |         |                | [facility]    |              |
|           |         |                | MEDICAL     |              |
|           |         |                | CENTER -    |              |
|           |         |                | LABORATORY  |              |
+-----------+---------+----------------+-------------+--------------+
 
 
 
+----------+
| Specimen |
+----------+
| Blood    |
+----------+
 
 
 
+----------------------+--------------------+--------------------+----------------+
| Performing           | Address            | City/State/Zipcode | Phone Number   |
 
| Organization         |                    |                    |                |
+----------------------+--------------------+--------------------+----------------+
|   [facility]     |   [address] |   [place]  |   [phone] |
| [facility]  |                    | [address]              |                |
| - LABORATORY         |                    |                    |                |
+----------------------+--------------------+--------------------+----------------+
 POC Glucose (2020  8:05 AM PST)
 
+-----------+-------+----------------+-------------+--------------+
| Component | Value | Ref Range      | Performed   | Pathologist  |
|           |       |                | At          | Signature    |
+-----------+-------+----------------+-------------+--------------+
| Glucose,  | 88    | 70 - 109 mg/dL | [facility]  |              |
| POC       |       |                | [facility]    |              |
|           |       |                | MEDICAL     |              |
|           |       |                | CENTER -    |              |
|           |       |                | LABORATORY  |              |
+-----------+-------+----------------+-------------+--------------+
 
 
 
+----------+
| Specimen |
+----------+
| Blood    |
+----------+
 
 
 
+----------------------+--------------------+--------------------+----------------+
| Performing           | Address            | City/State/Zipcode | Phone Number   |
| Organization         |                    |                    |                |
+----------------------+--------------------+--------------------+----------------+
|   [facility]     |   [address] |   [place]  |   [phone] |
| [facility]  |                    | [address]              |                |
| - LABORATORY         |                    |                    |                |
+----------------------+--------------------+--------------------+----------------+
 POC Glucose (2020  5:26 AM PST)
 
+-----------+-------+----------------+-------------+--------------+
| Component | Value | Ref Range      | Performed   | Pathologist  |
|           |       |                | At          | Signature    |
+-----------+-------+----------------+-------------+--------------+
| Glucose,  | 92    | 70 - 109 mg/dL | [facility]  |              |
| POC       |       |                | [facility]    |              |
|           |       |                | MEDICAL     |              |
|           |       |                | CENTER -    |              |
|           |       |                | LABORATORY  |              |
+-----------+-------+----------------+-------------+--------------+
 
 
 
+----------+
| Specimen |
+----------+
| Blood    |
+----------+
 
 
 
 
+----------------------+--------------------+--------------------+----------------+
| Performing           | Address            | City/State/Zipcode | Phone Number   |
| Organization         |                    |                    |                |
+----------------------+--------------------+--------------------+----------------+
|   [facility]     |   [address] |   [place]  |   [phone] |
| [facility]  |                    | [address]              |                |
| - LABORATORY         |                    |                    |                |
+----------------------+--------------------+--------------------+----------------+
 Magnesium (2020  5:25 AM PST)
 
+-----------+-------+-----------------+-------------+--------------+
| Component | Value | Ref Range       | Performed   | Pathologist  |
|           |       |                 | At          | Signature    |
+-----------+-------+-----------------+-------------+--------------+
| Magnesium | 1.9   | 1.6 - 2.6 mg/dL | [facility]  |              |
|           |       |                 | [facility]    |              |
|           |       |                 | MEDICAL     |              |
|           |       |                 | CENTER -    |              |
|           |       |                 | LABORATORY  |              |
+-----------+-------+-----------------+-------------+--------------+
 
 
 
+----------+
| Specimen |
+----------+
| Blood    |
+----------+
 
 
 
+----------------------+--------------------+--------------------+----------------+
| Performing           | Address            | City/State/Zipcode | Phone Number   |
| Organization         |                    |                    |                |
+----------------------+--------------------+--------------------+----------------+
|   [facility]     |   [address] |   [place]  |   [phone] |
| [facility]  |                    | [address]              |                |
| - LABORATORY         |                    |                    |                |
+----------------------+--------------------+--------------------+----------------+
 Comprehensive Metabolic Panel (2020  5:25 AM PST)
 
+-------------+--------------------------+-----------------+-------------+--------------+
| Component   | Value                    | Ref Range       | Performed   | Pathologist  |
|             |                          |                 | At          | Signature    |
+-------------+--------------------------+-----------------+-------------+--------------+
| Na          | 134 (L)                  | 136 - 145       | [facility]  |              |
|             |                          | mmol/L          | [facility]    |              |
|             |                          |                 | MEDICAL     |              |
|             |                          |                 | CENTER -    |              |
|             |                          |                 | LABORATORY  |              |
+-------------+--------------------------+-----------------+-------------+--------------+
| K           | 5.1                      | 3.4 - 5.1       | [facility]  |              |
|             |                          | mmol/L          | [facility]    |              |
|             |                          |                 | MEDICAL     |              |
|             |                          |                 | CENTER -    |              |
|             |                          |                 | LABORATORY  |              |
+-------------+--------------------------+-----------------+-------------+--------------+
| Cl          | 108 (H)                  | 98 - 107 mmol/L | [facility]  |              |
|             |                          |                 | [facility]    |              |
|             |                          |                 | MEDICAL     |              |
 
|             |                          |                 | CENTER -    |              |
|             |                          |                 | LABORATORY  |              |
+-------------+--------------------------+-----------------+-------------+--------------+
| CO2         | 23                       | 20 - 31 mmol/L  | [facility]  |              |
|             |                          |                 | [facility]    |              |
|             |                          |                 | MEDICAL     |              |
|             |                          |                 | CENTER -    |              |
|             |                          |                 | LABORATORY  |              |
+-------------+--------------------------+-----------------+-------------+--------------+
| Anion Gap   | 3                        | 3 - 16 mmol/L   | [facility]  |              |
|             |                          |                 | [facility]    |              |
|             |                          |                 | MEDICAL     |              |
|             |                          |                 | CENTER -    |              |
|             |                          |                 | LABORATORY  |              |
+-------------+--------------------------+-----------------+-------------+--------------+
| Glucose     | 90                       | 60 - 106 mg/dL  | [facility]  |              |
|             |                          |                 | [facility]    |              |
|             |                          |                 | MEDICAL     |              |
|             |                          |                 | CENTER -    |              |
|             |                          |                 | LABORATORY  |              |
+-------------+--------------------------+-----------------+-------------+--------------+
| BUN         | 21                       | 9 - 23 mg/dL    | [facility]  |              |
|             |                          |                 | [facility]    |              |
|             |                          |                 | MEDICAL     |              |
|             |                          |                 | CENTER -    |              |
|             |                          |                 | LABORATORY  |              |
+-------------+--------------------------+-----------------+-------------+--------------+
| Creatinine  | 1.06                     | 0.70 - 1.30     | [facility]  |              |
|             |                          | mg/dL           | [facility]    |              |
|             |                          |                 | MEDICAL     |              |
|             |                          |                 | CENTER -    |              |
|             |                          |                 | LABORATORY  |              |
+-------------+--------------------------+-----------------+-------------+--------------+
| eGFR if not | >60Comment: GLOMERULAR   | >=60            | [facility]  |              |
|      | FILTRATION               | mL/min/1.73m2   | [facility]    |              |
| [employer]    | RATE,ESTIMATED           |                 | MEDICAL     |              |
|             |   mL/min/1.43w9Cctt than |                 | CENTER -    |              |
|             |  60    Chronic kidney    |                 | LABORATORY  |              |
|             | disease,if found over a  |                 |             |              |
|             | 3-month period.Less than |                 |             |              |
|             |  15    Kidney failureFor |                 |             |              |
|             |                   |                 |             |              |
|             | Americans,multiply the   |                 |             |              |
|             | calculated GFR by 1.21.  |                 |             |              |
|             |                          |                 |             |              |
+-------------+--------------------------+-----------------+-------------+--------------+
| Calcium     | 7.1 (L)                  | 8.7 - 10.4      | [facility]  |              |
|             |                          | mg/dL           | [facility]    |              |
|             |                          |                 | MEDICAL     |              |
|             |                          |                 | CENTER -    |              |
|             |                          |                 | LABORATORY  |              |
+-------------+--------------------------+-----------------+-------------+--------------+
| Albumin     | 2.0 (L)                  | 3.2 - 4.8 g/dL  | [facility]  |              |
|             |                          |                 | [facility]    |              |
|             |                          |                 | MEDICAL     |              |
|             |                          |                 | CENTER -    |              |
|             |                          |                 | LABORATORY  |              |
+-------------+--------------------------+-----------------+-------------+--------------+
| Bilirubin   | 0.6                      | 0.3 - 1.2 mg/dL | [facility]  |              |
| Total       |                          |                 | [facility]    |              |
 
|             |                          |                 | MEDICAL     |              |
|             |                          |                 | CENTER -    |              |
|             |                          |                 | LABORATORY  |              |
+-------------+--------------------------+-----------------+-------------+--------------+
| Total       | 5.5 (L)                  | 5.7 - 8.2 g/dL  | [facility]  |              |
| Protein     |                          |                 | [facility]    |              |
|             |                          |                 | MEDICAL     |              |
|             |                          |                 | CENTER -    |              |
|             |                          |                 | LABORATORY  |              |
+-------------+--------------------------+-----------------+-------------+--------------+
| AST         | 54 (H)                   | 0 - 34 U/L      | [facility]  |              |
|             |                          |                 | [facility]    |              |
|             |                          |                 | MEDICAL     |              |
|             |                          |                 | CENTER -    |              |
|             |                          |                 | LABORATORY  |              |
+-------------+--------------------------+-----------------+-------------+--------------+
| ALT         | 24                       | 10 - 49 U/L     | [facility]  |              |
|             |                          |                 | [facility]    |              |
|             |                          |                 | MEDICAL     |              |
|             |                          |                 | CENTER -    |              |
|             |                          |                 | LABORATORY  |              |
+-------------+--------------------------+-----------------+-------------+--------------+
| Alkaline    | 109                      | 46 - 116 U/L    | [facility]  |              |
| Phosphatase |                          |                 | [facility]    |              |
|             |                          |                 | MEDICAL     |              |
|             |                          |                 | CENTER -    |              |
|             |                          |                 | LABORATORY  |              |
+-------------+--------------------------+-----------------+-------------+--------------+
| Globulin    | 3.5                      | 2.1 - 3.8 g/dL  | [facility]  |              |
|             |                          |                 | [facility]    |              |
|             |                          |                 | MEDICAL     |              |
|             |                          |                 | CENTER -    |              |
|             |                          |                 | LABORATORY  |              |
+-------------+--------------------------+-----------------+-------------+--------------+
| Albumin/Kassy | 0.6 (L)                  | 0.8 - 1.9       | [facility]  |              |
| bulin Ratio |                          |                 | [facility]    |              |
|             |                          |                 | MEDICAL     |              |
|             |                          |                 | CENTER -    |              |
|             |                          |                 | LABORATORY  |              |
+-------------+--------------------------+-----------------+-------------+--------------+
| BUN/Creatin | 19.8                     |                 | [facility]  |              |
| ine Ratio   |                          |                 | [facility]    |              |
|             |                          |                 | MEDICAL     |              |
|             |                          |                 | CENTER -    |              |
|             |                          |                 | LABORATORY  |              |
+-------------+--------------------------+-----------------+-------------+--------------+
 
 
 
+----------+
| Specimen |
+----------+
| Blood    |
+----------+
 
 
 
+----------------------+--------------------+--------------------+----------------+
| Performing           | Address            | City/State/Zipcode | Phone Number   |
| Organization         |                    |                    |                |
 
+----------------------+--------------------+--------------------+----------------+
|   [facility]     |   [address] |   [place]  |   [phone] |
| [facility]  |                    | [id number]              |                |
| - LABORATORY         |                    |                    |                |
+----------------------+--------------------+--------------------+----------------+
 CBC with Differential (2020  5:25 AM PST)
 
+-------------+--------------------------+-----------------+-------------+--------------+
| Component   | Value                    | Ref Range       | Performed   | Pathologist  |
|             |                          |                 | At          | Signature    |
+-------------+--------------------------+-----------------+-------------+--------------+
| WBC         | 2.1 (LL)Comment:         | 4.0 - 11.0 K/uL | [facility]  |              |
|             | Critical Result called   |                 | [facility]    |              |
|             | to and read back by      |                 | MEDICAL     |              |
|             | Judy Obrien on         |                 | CENTER -    |              |
|             | 2020 at 6:16 AM by   |                 | LABORATORY  |              |
|             | Mark Zepeda.          |                 |             |              |
+-------------+--------------------------+-----------------+-------------+--------------+
| RBC         | 3.15 (L)                 | 4.30 - 5.70     | [facility]  |              |
|             |                          | M/uL            | [facility]    |              |
|             |                          |                 | MEDICAL     |              |
|             |                          |                 | CENTER -    |              |
|             |                          |                 | LABORATORY  |              |
+-------------+--------------------------+-----------------+-------------+--------------+
| Hemoglobin  | 10.0 (L)                 | 13.5 - 18.0     | [facility]  |              |
|             |                          | g/dL            | [facility]    |              |
|             |                          |                 | MEDICAL     |              |
|             |                          |                 | CENTER -    |              |
|             |                          |                 | LABORATORY  |              |
+-------------+--------------------------+-----------------+-------------+--------------+
| Hematocrit  | 28.9 (L)                 | 40.0 - 51.0 %   | [facility]  |              |
|             |                          |                 | [facility]    |              |
|             |                          |                 | MEDICAL     |              |
|             |                          |                 | CENTER -    |              |
|             |                          |                 | LABORATORY  |              |
+-------------+--------------------------+-----------------+-------------+--------------+
| MCV         | 91.7                     | 83.0 - 101.0 fL | [facility]  |              |
|             |                          |                 | [facility]    |              |
|             |                          |                 | MEDICAL     |              |
|             |                          |                 | CENTER -    |              |
|             |                          |                 | LABORATORY  |              |
+-------------+--------------------------+-----------------+-------------+--------------+
| MCH         | 31.7                     | 28.0 - 35.0 pg  | [facility]  |              |
|             |                          |                 | [facility]    |              |
|             |                          |                 | MEDICAL     |              |
|             |                          |                 | CENTER -    |              |
|             |                          |                 | LABORATORY  |              |
+-------------+--------------------------+-----------------+-------------+--------------+
| MCHC        | 34.6                     | 32.0 - 36.0     | [facility]  |              |
|             |                          | g/dL            | [facility]    |              |
|             |                          |                 | MEDICAL     |              |
|             |                          |                 | CENTER -    |              |
|             |                          |                 | LABORATORY  |              |
+-------------+--------------------------+-----------------+-------------+--------------+
| RDW-CV      | 13.5                     | <15.0 %         | [facility]  |              |
|             |                          |                 | [facility]    |              |
|             |                          |                 | MEDICAL     |              |
|             |                          |                 | CENTER -    |              |
|             |                          |                 | LABORATORY  |              |
+-------------+--------------------------+-----------------+-------------+--------------+
 
| RDW-SD      | 45.6                     | 35.1 - 46.3 fL  | [facility]  |              |
|             |                          |                 | [facility]    |              |
|             |                          |                 | MEDICAL     |              |
|             |                          |                 | CENTER -    |              |
|             |                          |                 | LABORATORY  |              |
+-------------+--------------------------+-----------------+-------------+--------------+
| Platelet    | 83 (L)                   | 140 - 440 K/uL  | [facility]  |              |
| Count       |                          |                 | [facility]    |              |
|             |                          |                 | MEDICAL     |              |
|             |                          |                 | CENTER -    |              |
|             |                          |                 | LABORATORY  |              |
+-------------+--------------------------+-----------------+-------------+--------------+
| MPV         | 11.3                     | 6.5 - 12.4 fL   | [facility]  |              |
|             |                          |                 | [facility]    |              |
|             |                          |                 | MEDICAL     |              |
|             |                          |                 | CENTER -    |              |
|             |                          |                 | LABORATORY  |              |
+-------------+--------------------------+-----------------+-------------+--------------+
| Immature    | 3.5Comment: Low PLT +    | 0.9 - 11.2 %    | [facility]  |              |
| Platelet    | Low IPF are consistent   |                 | [facility]    |              |
| Fraction    | with a production        |                 | MEDICAL     |              |
|             | disorder. Low PLT + High |                 | CENTER -    |              |
|             |  IPF are consistent with |                 | LABORATORY  |              |
|             |  increased platelet      |                 |             |              |
|             | destruction.             |                 |             |              |
+-------------+--------------------------+-----------------+-------------+--------------+
| %           | 53.1                     | 45.0 - 82.0 %   | [facility]  |              |
| Neutrophils |                          |                 | [facility]    |              |
|             |                          |                 | MEDICAL     |              |
|             |                          |                 | CENTER -    |              |
|             |                          |                 | LABORATORY  |              |
+-------------+--------------------------+-----------------+-------------+--------------+
| %           | 37.0                     | 20.0 - 45.0 %   | [facility]  |              |
| Lymphocytes |                          |                 | [facility]    |              |
|             |                          |                 | MEDICAL     |              |
|             |                          |                 | CENTER -    |              |
|             |                          |                 | LABORATORY  |              |
+-------------+--------------------------+-----------------+-------------+--------------+
| % Monocytes | 6.2                      | 4.0 - 12.0 %    | [facility]  |              |
|             |                          |                 | [facility]    |              |
|             |                          |                 | MEDICAL     |              |
|             |                          |                 | CENTER -    |              |
|             |                          |                 | LABORATORY  |              |
+-------------+--------------------------+-----------------+-------------+--------------+
| %           | 1.9                      | 0.0 - 5.0 %     | [facility]  |              |
| Eosinophils |                          |                 | [facility]    |              |
|             |                          |                 | MEDICAL     |              |
|             |                          |                 | CENTER -    |              |
|             |                          |                 | LABORATORY  |              |
+-------------+--------------------------+-----------------+-------------+--------------+
| % Basophils | 0.9                      | 0.0 - 1.0 %     | [facility]  |              |
|             |                          |                 | [facility]    |              |
|             |                          |                 | MEDICAL     |              |
|             |                          |                 | CENTER -    |              |
|             |                          |                 | LABORATORY  |              |
+-------------+--------------------------+-----------------+-------------+--------------+
| % Immature  | 0.9 (H)Comment:          | 0.0 - 0.4 %     | [facility]  |              |
| Granulocyte | Preliminary studies have |                 | [facility]    |              |
| s           |  indicated the IG%       |                 | MEDICAL     |              |
|             | and/or IG# show promise  |                 | CENTER -    |              |
 
|             | as an early indicator    |                 | LABORATORY  |              |
|             | for infection.           |                 |             |              |
+-------------+--------------------------+-----------------+-------------+--------------+
| Absolute    | 1.12 (L)                 | 1.80 - 8.50     | [facility]  |              |
| Neutrophils |                          | K/uL            | [facility]    |              |
|             |                          |                 | MEDICAL     |              |
|             |                          |                 | CENTER -    |              |
|             |                          |                 | LABORATORY  |              |
+-------------+--------------------------+-----------------+-------------+--------------+
| Absolute    | 0.78                     | 0.60 - 3.20     | [facility]  |              |
| Lymphocytes |                          | K/uL            | [facility]    |              |
|             |                          |                 | MEDICAL     |              |
|             |                          |                 | CENTER -    |              |
|             |                          |                 | LABORATORY  |              |
+-------------+--------------------------+-----------------+-------------+--------------+
| Absolute    | 0.13                     | 0.00 - 1.00     | [facility]  |              |
| Monocytes   |                          | K/uL            | [facility]    |              |
|             |                          |                 | MEDICAL     |              |
|             |                          |                 | CENTER -    |              |
|             |                          |                 | LABORATORY  |              |
+-------------+--------------------------+-----------------+-------------+--------------+
| Absolute    | 0.04                     | 0.00 - 0.40     | [facility]  |              |
| Eosinophils |                          | K/uL            | [facility]    |              |
|             |                          |                 | MEDICAL     |              |
|             |                          |                 | CENTER -    |              |
|             |                          |                 | LABORATORY  |              |
+-------------+--------------------------+-----------------+-------------+--------------+
| Absolute    | 0.02                     | 0.00 - 0.10     | [facility]  |              |
| Basophils   |                          | K/uL            | [facility]    |              |
|             |                          |                 | MEDICAL     |              |
|             |                          |                 | CENTER -    |              |
|             |                          |                 | LABORATORY  |              |
+-------------+--------------------------+-----------------+-------------+--------------+
| Absolute    | 0.02                     | 0.00 - 0.03     | [facility]  |              |
| Immature    |                          | K/uL            | [facility]    |              |
| Granulocyte |                          |                 | MEDICAL     |              |
| s           |                          |                 | CENTER -    |              |
|             |                          |                 | LABORATORY  |              |
+-------------+--------------------------+-----------------+-------------+--------------+
| % nRBC      | 0                        | 0 - 2 per 100   | [facility]  |              |
|             |                          | WBCs            | [facility]    |              |
|             |                          |                 | [facility]     |              |
|             |                          |                 | CENTER -    |              |
|             |                          |                 | LABORATORY  |              |
+-------------+--------------------------+-----------------+-------------+--------------+
| Absolute    | 0.00                     | 0.00 - 0.01     | [facility]  |              |
| nRBC        |                          | K/uL            | [facility]    |              |
|             |                          |                 | [facility]     |              |
|             |                          |                 | CENTER -    |              |
|             |                          |                 | LABORATORY  |              |
+-------------+--------------------------+-----------------+-------------+--------------+
 
 
 
+----------+
| Specimen |
+----------+
| Blood    |
+----------+
 
 
 
 
+----------------------+--------------------+--------------------+----------------+
| Performing           | Address            | City/State/Zipcode | Phone Number   |
| Organization         |                    |                    |                |
+----------------------+--------------------+--------------------+----------------+
|   [facility]     |   [address] |   [place]  |   [phone] |
| [facility]  |                    | [address]              |                |
| - LABORATORY         |                    |                    |                |
+----------------------+--------------------+--------------------+----------------+
 POC Glucose (2020  9:26 PM PST)
 
+-----------+-------+----------------+-------------+--------------+
| Component | Value | Ref Range      | Performed   | Pathologist  |
|           |       |                | At          | Signature    |
+-----------+-------+----------------+-------------+--------------+
| Glucose,  | 85    | 70 - 109 mg/dL | [facility]  |              |
| POC       |       |                | [facility]    |              |
|           |       |                | MEDICAL     |              |
|           |       |                | CENTER -    |              |
|           |       |                | LABORATORY  |              |
+-----------+-------+----------------+-------------+--------------+
 
 
 
+----------+
| Specimen |
+----------+
| Blood    |
+----------+
 
 
 
+----------------------+--------------------+--------------------+----------------+
| Performing           | Address            | City/State/Zipcode | Phone Number   |
| Organization         |                    |                    |                |
+----------------------+--------------------+--------------------+----------------+
|   [facility]     |   [address] |   [place]  |   [phone] |
| [facility]  |                    | [address]              |                |
| - LABORATORY         |                    |                    |                |
+----------------------+--------------------+--------------------+----------------+
 POC Glucose (2020  4:51 PM PST)
 
+-----------+-------+----------------+-------------+--------------+
| Component | Value | Ref Range      | Performed   | Pathologist  |
|           |       |                | At          | Signature    |
+-----------+-------+----------------+-------------+--------------+
| Glucose,  | 90    | 70 - 109 mg/dL | [facility]  |              |
| POC       |       |                | [facility]    |              |
|           |       |                | MEDICAL     |              |
|           |       |                | CENTER -    |              |
|           |       |                | LABORATORY  |              |
+-----------+-------+----------------+-------------+--------------+
 
 
 
+----------+
| Specimen |
+----------+
| Blood    |
 
+----------+
 
 
 
+----------------------+--------------------+--------------------+----------------+
| Performing           | Address            | City/State/Zipcode | Phone Number   |
| Organization         |                    |                    |                |
+----------------------+--------------------+--------------------+----------------+
|   [facility]     |   [address] |   [place]  |   [phone] |
| [facility]  |                    | [address]              |                |
| - LABORATORY         |                    |                    |                |
+----------------------+--------------------+--------------------+----------------+
 Clostridioides difficile NAAT Reflex (2020  2:09 PM PST)
 
+-------------+--------------------------+-----------+-------------+--------------+
| Component   | Value                    | Ref Range | Performed   | Pathologist  |
|             |                          |           | At          | Signature    |
+-------------+--------------------------+-----------+-------------+--------------+
| C.          | NegativeComment: No      | Negative  | [facility]  |              |
| difficile,  | Toxigenic C. difficile   |           | [facility]    |              |
| Interp      | detected. Consider other |           | MEDICAL     |              |
|             |  causes of Diarrhea.     |           | CENTER -    |              |
|             | Repeat testing should    |           | LABORATORY  |              |
|             | not be performed within  |           |             |              |
|             | 7 days.                  |           |             |              |
+-------------+--------------------------+-----------+-------------+--------------+
| C.          | Negative                 |           | [facility]  |              |
| difficile,  |                          |           | [facility]    |              |
| NAAT        |                          |           | MEDICAL     |              |
|             |                          |           | CENTER -    |              |
|             |                          |           | LABORATORY  |              |
+-------------+--------------------------+-----------+-------------+--------------+
 
 
 
+---------------------+
| Specimen            |
+---------------------+
| Stool - Stool       |
| specimen (specimen) |
+---------------------+
 
 
 
+----------------------+--------------------+--------------------+----------------+
| Performing           | Address            | City/State/Zipcode | Phone Number   |
| Organization         |                    |                    |                |
+----------------------+--------------------+--------------------+----------------+
|   [facility]     |   [address] |   [place]  |   [phone] |
| [facility]  |                    | [address]              |                |
| - LABORATORY         |                    |                    |                |
+----------------------+--------------------+--------------------+----------------+
 POC Glucose (2020 11:32 AM PST)
 
+-----------+---------+----------------+-------------+--------------+
| Component | Value   | Ref Range      | Performed   | Pathologist  |
|           |         |                | At          | Signature    |
+-----------+---------+----------------+-------------+--------------+
| Glucose,  | 116 (H) | 70 - 109 mg/dL | [facility]  |              |
| POC       |         |                | [facility]    |              |
 
|           |         |                | MEDICAL     |              |
|           |         |                | CENTER -    |              |
|           |         |                | LABORATORY  |              |
+-----------+---------+----------------+-------------+--------------+
 
 
 
+----------+
| Specimen |
+----------+
| Blood    |
+----------+
 
 
 
+----------------------+--------------------+--------------------+----------------+
| Performing           | Address            | City/State/Zipcode | Phone Number   |
| Organization         |                    |                    |                |
+----------------------+--------------------+--------------------+----------------+
|   [facility]     |   [address] |   [place]  |   [phone] |
| [facility]  |                    | [address]              |                |
| - LABORATORY         |                    |                    |                |
+----------------------+--------------------+--------------------+----------------+
 CBC with Differential (2020  6:45 AM PST)
 
+-------------+--------------------------+-----------------+-------------+--------------+
| Component   | Value                    | Ref Range       | Performed   | Pathologist  |
|             |                          |                 | At          | Signature    |
+-------------+--------------------------+-----------------+-------------+--------------+
| WBC         | 2.9 (L)                  | 4.0 - 11.0 K/uL | [facility]  |              |
|             |                          |                 | [facility]    |              |
|             |                          |                 | MEDICAL     |              |
|             |                          |                 | CENTER -    |              |
|             |                          |                 | LABORATORY  |              |
+-------------+--------------------------+-----------------+-------------+--------------+
| RBC         | 3.22 (L)                 | 4.30 - 5.70     | [facility]  |              |
|             |                          | M/uL            | [facility]    |              |
|             |                          |                 | MEDICAL     |              |
|             |                          |                 | CENTER -    |              |
|             |                          |                 | LABORATORY  |              |
+-------------+--------------------------+-----------------+-------------+--------------+
| Hemoglobin  | 10.1 (L)                 | 13.5 - 18.0     | [facility]  |              |
|             |                          | g/dL            | [facility]    |              |
|             |                          |                 | MEDICAL     |              |
|             |                          |                 | CENTER -    |              |
|             |                          |                 | LABORATORY  |              |
+-------------+--------------------------+-----------------+-------------+--------------+
| Hematocrit  | 29.9 (L)                 | 40.0 - 51.0 %   | [facility]  |              |
|             |                          |                 | [facility]    |              |
|             |                          |                 | MEDICAL     |              |
|             |                          |                 | CENTER -    |              |
|             |                          |                 | LABORATORY  |              |
+-------------+--------------------------+-----------------+-------------+--------------+
| MCV         | 92.9                     | 83.0 - 101.0 fL | [facility]  |              |
|             |                          |                 | [facility]    |              |
|             |                          |                 | MEDICAL     |              |
|             |                          |                 | CENTER -    |              |
|             |                          |                 | LABORATORY  |              |
+-------------+--------------------------+-----------------+-------------+--------------+
| MCH         | 31.4                     | 28.0 - 35.0 pg  | [facility]  |              |
 
|             |                          |                 | [facility]    |              |
|             |                          |                 | MEDICAL     |              |
|             |                          |                 | CENTER -    |              |
|             |                          |                 | LABORATORY  |              |
+-------------+--------------------------+-----------------+-------------+--------------+
| MCHC        | 33.8                     | 32.0 - 36.0     | [facility]  |              |
|             |                          | g/dL            | [facility]    |              |
|             |                          |                 | MEDICAL     |              |
|             |                          |                 | CENTER -    |              |
|             |                          |                 | LABORATORY  |              |
+-------------+--------------------------+-----------------+-------------+--------------+
| RDW-CV      | 13.6                     | <15.0 %         | [facility]  |              |
|             |                          |                 | [facility]    |              |
|             |                          |                 | MEDICAL     |              |
|             |                          |                 | CENTER -    |              |
|             |                          |                 | LABORATORY  |              |
+-------------+--------------------------+-----------------+-------------+--------------+
| RDW-SD      | 46.6 (H)                 | 35.1 - 46.3 fL  | [facility]  |              |
|             |                          |                 | [facility]    |              |
|             |                          |                 | MEDICAL     |              |
|             |                          |                 | CENTER -    |              |
|             |                          |                 | LABORATORY  |              |
+-------------+--------------------------+-----------------+-------------+--------------+
| Platelet    | 90 (L)                   | 140 - 440 K/uL  | [facility]  |              |
| Count       |                          |                 | [facility]    |              |
|             |                          |                 | MEDICAL     |              |
|             |                          |                 | CENTER -    |              |
|             |                          |                 | LABORATORY  |              |
+-------------+--------------------------+-----------------+-------------+--------------+
| MPV         | 10.4                     | 6.5 - 12.4 fL   | [facility]  |              |
|             |                          |                 | [facility]    |              |
|             |                          |                 | MEDICAL     |              |
|             |                          |                 | CENTER -    |              |
|             |                          |                 | LABORATORY  |              |
+-------------+--------------------------+-----------------+-------------+--------------+
| Immature    | 2.3Comment: Low PLT +    | 0.9 - 11.2 %    | [facility]  |              |
| Platelet    | Low IPF are consistent   |                 | [facility]    |              |
| Fraction    | with a production        |                 | MEDICAL     |              |
|             | disorder. Low PLT + High |                 | CENTER -    |              |
|             |  IPF are consistent with |                 | LABORATORY  |              |
|             |  increased platelet      |                 |             |              |
|             | destruction.             |                 |             |              |
+-------------+--------------------------+-----------------+-------------+--------------+
| %           | 59.5                     | 45.0 - 82.0 %   | [facility]  |              |
| Neutrophils |                          |                 | [facility]    |              |
|             |                          |                 | MEDICAL     |              |
|             |                          |                 | CENTER -    |              |
|             |                          |                 | LABORATORY  |              |
+-------------+--------------------------+-----------------+-------------+--------------+
| %           | 29.8                     | 20.0 - 45.0 %   | [facility]  |              |
| Lymphocytes |                          |                 | [facility]    |              |
|             |                          |                 | MEDICAL     |              |
|             |                          |                 | CENTER -    |              |
|             |                          |                 | LABORATORY  |              |
+-------------+--------------------------+-----------------+-------------+--------------+
| % Monocytes | 8.1                      | 4.0 - 12.0 %    | [facility]  |              |
|             |                          |                 | [facility]    |              |
|             |                          |                 | MEDICAL     |              |
|             |                          |                 | CENTER -    |              |
|             |                          |                 | LABORATORY  |              |
 
+-------------+--------------------------+-----------------+-------------+--------------+
| %           | 1.8                      | 0.0 - 5.0 %     | [facility]  |              |
| Eosinophils |                          |                 | [facility]    |              |
|             |                          |                 | MEDICAL     |              |
|             |                          |                 | CENTER -    |              |
|             |                          |                 | LABORATORY  |              |
+-------------+--------------------------+-----------------+-------------+--------------+
| % Basophils | 0.4                      | 0.0 - 1.0 %     | [facility]  |              |
|             |                          |                 | [facility]    |              |
|             |                          |                 | MEDICAL     |              |
|             |                          |                 | CENTER -    |              |
|             |                          |                 | LABORATORY  |              |
+-------------+--------------------------+-----------------+-------------+--------------+
| % Immature  | 0.4                      | 0.0 - 0.4 %     | [facility]  |              |
| Granulocyte |                          |                 | [facility]    |              |
| s           |                          |                 | MEDICAL     |              |
|             |                          |                 | CENTER -    |              |
|             |                          |                 | LABORATORY  |              |
+-------------+--------------------------+-----------------+-------------+--------------+
| Absolute    | 1.70 (L)                 | 1.80 - 8.50     | [facility]  |              |
| Neutrophils |                          | K/uL            | [facility]    |              |
|             |                          |                 | MEDICAL     |              |
|             |                          |                 | CENTER -    |              |
|             |                          |                 | LABORATORY  |              |
+-------------+--------------------------+-----------------+-------------+--------------+
| Absolute    | 0.85                     | 0.60 - 3.20     | [facility]  |              |
| Lymphocytes |                          | K/uL            | [facility]    |              |
|             |                          |                 | MEDICAL     |              |
|             |                          |                 | CENTER -    |              |
|             |                          |                 | LABORATORY  |              |
+-------------+--------------------------+-----------------+-------------+--------------+
| Absolute    | 0.23                     | 0.00 - 1.00     | [facility]  |              |
| Monocytes   |                          | K/uL            | [facility]    |              |
|             |                          |                 | MEDICAL     |              |
|             |                          |                 | CENTER -    |              |
|             |                          |                 | LABORATORY  |              |
+-------------+--------------------------+-----------------+-------------+--------------+
| Absolute    | 0.05                     | 0.00 - 0.40     | [facility]  |              |
| Eosinophils |                          | K/uL            | [facility]    |              |
|             |                          |                 | MEDICAL     |              |
|             |                          |                 | CENTER -    |              |
|             |                          |                 | LABORATORY  |              |
+-------------+--------------------------+-----------------+-------------+--------------+
| Absolute    | 0.01                     | 0.00 - 0.10     | [facility]  |              |
| Basophils   |                          | K/uL            | [facility]    |              |
|             |                          |                 | MEDICAL     |              |
|             |                          |                 | CENTER -    |              |
|             |                          |                 | LABORATORY  |              |
+-------------+--------------------------+-----------------+-------------+--------------+
| Absolute    | 0.01                     | 0.00 - 0.03     | [facility]  |              |
| Immature    |                          | K/uL            | [facility]    |              |
| Granulocyte |                          |                 | MEDICAL     |              |
| s           |                          |                 | CENTER -    |              |
|             |                          |                 | LABORATORY  |              |
+-------------+--------------------------+-----------------+-------------+--------------+
| % nRBC      | 0                        | 0 - 2 per 100   | [facility]  |              |
|             |                          | WBCs            | [facility]    |              |
|             |                          |                 | MEDICAL     |              |
|             |                          |                 | CENTER -    |              |
|             |                          |                 | LABORATORY  |              |
 
+-------------+--------------------------+-----------------+-------------+--------------+
| Absolute    | 0.00                     | 0.00 - 0.01     | [facility]  |              |
| nRBC        |                          | K/uL            | [facility]    |              |
|             |                          |                 | MEDICAL     |              |
|             |                          |                 | CENTER -    |              |
|             |                          |                 | LABORATORY  |              |
+-------------+--------------------------+-----------------+-------------+--------------+
 
 
 
+----------+
| Specimen |
+----------+
| Blood    |
+----------+
 
 
 
+----------------------+--------------------+--------------------+----------------+
| Performing           | Address            | City/State/Zipcode | Phone Number   |
| Organization         |                    |                    |                |
+----------------------+--------------------+--------------------+----------------+
|   [facility] [facility]     |   [address] |   [place]  |   [phone] |
| [facility]  |                    | [address]              |                |
| - LABORATORY         |                    |                    |                |
+----------------------+--------------------+--------------------+----------------+
 POC Glucose (2020  6:36 AM PST)
 
+-----------+---------+----------------+-------------+--------------+
| Component | Value   | Ref Range      | Performed   | Pathologist  |
|           |         |                | At          | Signature    |
+-----------+---------+----------------+-------------+--------------+
| Glucose,  | 120 (H) | 70 - 109 mg/dL | [facility]  |              |
| POC       |         |                | [facility]    |              |
|           |         |                | [facility]     |              |
|           |         |                | CENTER -    |              |
|           |         |                | LABORATORY  |              |
+-----------+---------+----------------+-------------+--------------+
 
 
 
+----------+
| Specimen |
+----------+
| Blood    |
+----------+
 
 
 
+----------------------+--------------------+--------------------+----------------+
| Performing           | Address            | City/State/Zipcode | Phone Number   |
| Organization         |                    |                    |                |
+----------------------+--------------------+--------------------+----------------+
|   [facility]     |   [address] |   [place]  |   [phone] |
| [facility]  |                    | [address]              |                |
| - LABORATORY         |                    |                    |                |
+----------------------+--------------------+--------------------+----------------+
 Magnesium (2020  4:13 AM PST)
 
+-----------+-------+-----------------+-------------+--------------+
 
| Component | Value | Ref Range       | Performed   | Pathologist  |
|           |       |                 | At          | Signature    |
+-----------+-------+-----------------+-------------+--------------+
| Magnesium | 2.0   | 1.6 - 2.6 mg/dL | [facility]  |              |
|           |       |                 |  [facility]    |              |
|           |       |                 | [facility]     |              |
|           |       |                 | C[facility] -    |              |
|           |       |                 | LABORATORY  |              |
+-----------+-------+-----------------+-------------+--------------+
 
 
 
+----------+
| Specimen |
+----------+
| Blood    |
+----------+
 
 
 
+----------------------+--------------------+--------------------+----------------+
| Performing           | Address            | City/State/Zipcode | Phone Number   |
| Organization         |                    |                    |                |
+----------------------+--------------------+--------------------+----------------+
|   [facility]     |   [address] |   [place]  |   [phone] |
| [facility]  |                    | [address]              |                |
| - LABORATORY         |                    |                    |                |
+----------------------+--------------------+--------------------+----------------+
 Comprehensive Metabolic Panel (2020  4:13 AM PST)
 
+-------------+--------------------------+-----------------+-------------+--------------+
| Component   | Value                    | Ref Range       | Performed   | Pathologist  |
|             |                          |                 | At          | Signature    |
+-------------+--------------------------+-----------------+-------------+--------------+
| Na          | 137                      | 136 - 145       | [facility]  |              |
|             |                          | mmol/L          | [facility]    |              |
|             |                          |                 | MEDICAL     |              |
|             |                          |                 | CENTER -    |              |
|             |                          |                 | LABORATORY  |              |
+-------------+--------------------------+-----------------+-------------+--------------+
| K           | 4.9                      | 3.4 - 5.1       | [facility]  |              |
|             |                          | mmol/L          | [facility]    |              |
|             |                          |                 | MEDICAL     |              |
|             |                          |                 | CENTER -    |              |
|             |                          |                 | LABORATORY  |              |
+-------------+--------------------------+-----------------+-------------+--------------+
| Cl          | 107                      | 98 - 107 mmol/L | [facility]  |              |
|             |                          |                 | [facility]    |              |
|             |                          |                 | MEDICAL     |              |
|             |                          |                 | CENTER -    |              |
|             |                          |                 | LABORATORY  |              |
+-------------+--------------------------+-----------------+-------------+--------------+
| CO2         | 26                       | 20 - 31 mmol/L  | [facility]  |              |
|             |                          |                 | [facility]    |              |
|             |                          |                 | MEDICAL     |              |
|             |                          |                 | CENTER -    |              |
|             |                          |                 | LABORATORY  |              |
+-------------+--------------------------+-----------------+-------------+--------------+
| Anion Gap   | 4                        | 3 - 16 mmol/L   | [facility]  |              |
|             |                          |                 | [facility]    |              |
 
|             |                          |                 | MEDICAL     |              |
|             |                          |                 | CENTER -    |              |
|             |                          |                 | LABORATORY  |              |
+-------------+--------------------------+-----------------+-------------+--------------+
| Glucose     | 146 (H)                  | 60 - 106 mg/dL  | [facility]  |              |
|             |                          |                 | [facility]    |              |
|             |                          |                 | MEDICAL     |              |
|             |                          |                 | CENTER -    |              |
|             |                          |                 | LABORATORY  |              |
+-------------+--------------------------+-----------------+-------------+--------------+
| BUN         | 22                       | 9 - 23 mg/dL    | [facility]  |              |
|             |                          |                 | [facility]    |              |
|             |                          |                 | MEDICAL     |              |
|             |                          |                 | CENTER -    |              |
|             |                          |                 | LABORATORY  |              |
+-------------+--------------------------+-----------------+-------------+--------------+
| Creatinine  | 1.09                     | 0.70 - 1.30     | [facility]  |              |
|             |                          | mg/dL           | [facility]    |              |
|             |                          |                 | MEDICAL     |              |
|             |                          |                 | CENTER -    |              |
|             |                          |                 | LABORATORY  |              |
+-------------+--------------------------+-----------------+-------------+--------------+
| eGFR if not | >60Comment: GLOMERULAR   | >=60            | [facility]  |              |
|      | FILTRATION               | mL/min/1.73m2   | [facility]    |              |
| [employer]    | RATE,ESTIMATED           |                 | MEDICAL     |              |
|             |   mL/min/1.81y2Laqw than |                 | CENTER -    |              |
|             |  60    Chronic kidney    |                 | LABORATORY  |              |
|             | disease,if found over a  |                 |             |              |
|             | 3-month period.Less than |                 |             |              |
|             |  15    Kidney failureFor |                 |             |              |
|             |                   |                 |             |              |
|             | Americans,multiply the   |                 |             |              |
|             | calculated GFR by 1.21.  |                 |             |              |
|             |                          |                 |             |              |
+-------------+--------------------------+-----------------+-------------+--------------+
| Calcium     | 7.2 (L)                  | 8.7 - 10.4      | [facility]  |              |
|             |                          | mg/dL           | [facility]    |              |
|             |                          |                 | MEDICAL     |              |
|             |                          |                 | CENTER -    |              |
|             |                          |                 | LABORATORY  |              |
+-------------+--------------------------+-----------------+-------------+--------------+
| Albumin     | 1.8 (L)                  | 3.2 - 4.8 g/dL  | [facility]  |              |
|             |                          |                 | [facility]    |              |
|             |                          |                 | MEDICAL     |              |
|             |                          |                 | CENTER -    |              |
|             |                          |                 | LABORATORY  |              |
+-------------+--------------------------+-----------------+-------------+--------------+
| Bilirubin   | 0.3                      | 0.3 - 1.2 mg/dL | [facility]  |              |
| Total       |                          |                 | [facility]    |              |
|             |                          |                 | MEDICAL     |              |
|             |                          |                 | CENTER -    |              |
|             |                          |                 | LABORATORY  |              |
+-------------+--------------------------+-----------------+-------------+--------------+
| Total       | 5.0 (L)                  | 5.7 - 8.2 g/dL  | [facility]  |              |
| Protein     |                          |                 | [facility]    |              |
|             |                          |                 | MEDICAL     |              |
|             |                          |                 | CENTER -    |              |
|             |                          |                 | LABORATORY  |              |
+-------------+--------------------------+-----------------+-------------+--------------+
| AST         | 44 (H)                   | 0 - 34 U/L      | [facility]  |              |
 
|             |                          |                 | [facility]    |              |
|             |                          |                 | MEDICAL     |              |
|             |                          |                 | CENTER -    |              |
|             |                          |                 | LABORATORY  |              |
+-------------+--------------------------+-----------------+-------------+--------------+
| ALT         | 23                       | 10 - 49 U/L     | [facility]  |              |
|             |                          |                 | [facility]    |              |
|             |                          |                 | MEDICAL     |              |
|             |                          |                 | CENTER -    |              |
|             |                          |                 | LABORATORY  |              |
+-------------+--------------------------+-----------------+-------------+--------------+
| Alkaline    | 90                       | 46 - 116 U/L    | [facility]  |              |
| Phosphatase |                          |                 | [facility]    |              |
|             |                          |                 | MEDICAL     |              |
|             |                          |                 | CENTER -    |              |
|             |                          |                 | LABORATORY  |              |
+-------------+--------------------------+-----------------+-------------+--------------+
| Globulin    | 3.2                      | 2.1 - 3.8 g/dL  | [facility]  |              |
|             |                          |                 | [facility]    |              |
|             |                          |                 | MEDICAL     |              |
|             |                          |                 | CENTER -    |              |
|             |                          |                 | LABORATORY  |              |
+-------------+--------------------------+-----------------+-------------+--------------+
| Albumin/Kassy | 0.6 (L)                  | 0.8 - 1.9       | [facility]  |              |
| bulin Ratio |                          |                 | [facility]    |              |
|             |                          |                 | [facility]     |              |
|             |                          |                 | CENTER -    |              |
|             |                          |                 | LABORATORY  |              |
+-------------+--------------------------+-----------------+-------------+--------------+
| BUN/Creatin | 20.2                     |                 | [facility]  |              |
| ine Ratio   |                          |                 | [facility]    |              |
|             |                          |                 | MEDICAL     |              |
|             |                          |                 | CENTER -    |              |
|             |                          |                 | LABORATORY  |              |
+-------------+--------------------------+-----------------+-------------+--------------+
 
 
 
+----------+
| Specimen |
+----------+
| Blood    |
+----------+
 
 
 
+----------------------+--------------------+--------------------+----------------+
| Performing           | Address            | City/State/Zipcode | Phone Number   |
| Organization         |                    |                    |                |
+----------------------+--------------------+--------------------+----------------+
|   [facility]     |   [address] |   [place]  |   [phone] |
| [facility]  |                    | [address]              |                |
| - LABORATORY         |                    |                    |                |
+----------------------+--------------------+--------------------+----------------+
 POC Glucose (2020  8:37 PM PST)
 
+-----------+---------+----------------+-------------+--------------+
| Component | Value   | Ref Range      | Performed   | Pathologist  |
|           |         |                | At          | Signature    |
+-----------+---------+----------------+-------------+--------------+
 
| Glucose,  | 206 (H) | 70 - 109 mg/dL | [facility]  |              |
| POC       |         |                |  [facility]    |              |
|           |         |                | MED[facility]     |              |
|           |         |                | CENTER -    |              |
|           |         |                | LABORATORY  |              |
+-----------+---------+----------------+-------------+--------------+
 
 
 
+----------+
| Specimen |
+----------+
| Blood    |
+----------+
 
 
 
+----------------------+--------------------+--------------------+----------------+
| Performing           | Address            | City/State/Zipcode | Phone Number   |
| Organization         |                    |                    |                |
+----------------------+--------------------+--------------------+----------------+
|   [facility]     |   [address] |   [place]  |   [phone] |
| [facility]  |                    | [address]              |                |
| - LABORATORY         |                    |                    |                |
+----------------------+--------------------+--------------------+----------------+
 Stool Pathogens, NAAT (2020  5:59 PM PST)
 
+-------------+--------------+--------------+-------------+--------------+
| Component   | Value        | Ref Range    | Performed   | Pathologist  |
|             |              |              | At          | Signature    |
+-------------+--------------+--------------+-------------+--------------+
| Campylobact | Not Detected | Not Detected | [facility]  |              |
| er, NAAT    |              |              | [facility]    |              |
|             |              |              | MEDICAL     |              |
|             |              |              | CENTER -    |              |
|             |              |              | LABORATORY  |              |
+-------------+--------------+--------------+-------------+--------------+
| Salmonella, | Not Detected | Not Detected | [facility]  |              |
|  NAAT       |              |              | [facility]    |              |
|             |              |              | MEDICAL     |              |
|             |              |              | CENTER -    |              |
|             |              |              | LABORATORY  |              |
+-------------+--------------+--------------+-------------+--------------+
| Shigella    | Not Detected | Not Detected | [facility]  |              |
| NAAT        |              |              | [facility]    |              |
|             |              |              | MEDICAL     |              |
|             |              |              | CENTER -    |              |
|             |              |              | LABORATORY  |              |
+-------------+--------------+--------------+-------------+--------------+
| Vibrio,     | Not Detected | Not Detected | [facility]  |              |
| NAAT        |              |              | [facility]    |              |
|             |              |              | MEDICAL     |              |
|             |              |              | CENTER -    |              |
|             |              |              | LABORATORY  |              |
+-------------+--------------+--------------+-------------+--------------+
| Yersinia    | Not Detected | Not Detected | [facility]  |              |
| enterocolit |              |              | [facility]    |              |
| ica, NAAT   |              |              | MEDICAL     |              |
|             |              |              | CENTER -    |              |
|             |              |              | LABORATORY  |              |
 
+-------------+--------------+--------------+-------------+--------------+
| Shigatoxin  | Not Detected | Not Detected | [facility]  |              |
| 1           |              |              | [facility]    |              |
|             |              |              | MEDICAL     |              |
|             |              |              | CENTER -    |              |
|             |              |              | LABORATORY  |              |
+-------------+--------------+--------------+-------------+--------------+
| Shigatoxin  | Not Detected | Not Detected | [facility]  |              |
| 2           |              |              | [facility]    |              |
|             |              |              | MEDICAL     |              |
|             |              |              | CENTER -    |              |
|             |              |              | LABORATORY  |              |
+-------------+--------------+--------------+-------------+--------------+
 
 
 
+---------------------+
| Specimen            |
+---------------------+
| Stool - Stool       |
| specimen (specimen) |
+---------------------+
 
 
 
+----------------------+--------------------+--------------------+----------------+
| Performing           | Address            | City/State/Zipcode | Phone Number   |
| Organization         |                    |                    |                |
+----------------------+--------------------+--------------------+----------------+
|   [facility]     |   [address] |   [place]  |   [phone] |
| [facility]  |                    | [address]              |                |
| - LABORATORY         |                    |                    |                |
+----------------------+--------------------+--------------------+----------------+
 POC Glucose (2020  4:54 PM PST)
 
+-----------+---------+----------------+-------------+--------------+
| Component | Value   | Ref Range      | Performed   | Pathologist  |
|           |         |                | At          | Signature    |
+-----------+---------+----------------+-------------+--------------+
| Glucose,  | 170 (H) | 70 - 109 mg/dL | [facility]  |              |
| POC       |         |                | [facility]    |              |
|           |         |                | MEDICAL     |              |
|           |         |                | CENTER -    |              |
|           |         |                | LABORATORY  |              |
+-----------+---------+----------------+-------------+--------------+
 
 
 
+----------+
| Specimen |
+----------+
| Blood    |
+----------+
 
 
 
+----------------------+--------------------+--------------------+----------------+
| Performing           | Address            | City/State/Zipcode | Phone Number   |
| Organization         |                    |                    |                |
+----------------------+--------------------+--------------------+----------------+
 
|   [facility]     |   [address] |   [place]  |   [phone] |
| [facility]  |                    | [address]              |                |
| - LABORATORY         |                    |                    |                |
+----------------------+--------------------+--------------------+----------------+
 PH, Body Fluid (2020  3:59 PM PST)
 
+-----------+---------+-----------+-------------+--------------+
| Component | Value   | Ref Range | Performed   | Pathologist  |
|           |         |           | At          | Signature    |
+-----------+---------+-----------+-------------+--------------+
| Specimen  | [facility] |           | [facility]  |              |
| Source    |         |           | [facility]    |              |
|           |         |           | MEDICAL     |              |
|           |         |           | CENTER -    |              |
|           |         |           | LABORATORY  |              |
+-----------+---------+-----------+-------------+--------------+
| pH, Body  | 7.6     | 6.9 - 7.6 | [facility]  |              |
| Fluid     |         |           |  [facility]    |              |
|           |         |           | MEDICAL     |              |
|           |         |           | CENTER -    |              |
|           |         |           | LABORATORY  |              |
+-----------+---------+-----------+-------------+--------------+
 
 
 
+----------------------+
| Specimen             |
+----------------------+
| Body Fluid - [facility] |
|  fluid sample        |
| (specimen)           |
+----------------------+
 
 
 
+----------------------+--------------------+--------------------+----------------+
| Performing           | Address            | City/State/Zipcode | Phone Number   |
| Organization         |                    |                    |                |
+----------------------+--------------------+--------------------+----------------+
|   [facility]     |   [address] |   [place], [place]  |   [phone] |
| [facility]  |                    | [address]              |                |
| - LABORATORY         |                    |                    |                |
+----------------------+--------------------+--------------------+----------------+
 Cell Count with Differential, Body Fluid (2020  3:25 PM PST)
 
+-------------+--------------------------+-----------------+-------------+--------------+
| Component   | Value                    | Ref Range       | Performed   | Pathologist  |
|             |                          |                 | At          | Signature    |
+-------------+--------------------------+-----------------+-------------+--------------+
| Specimen    | [facility]                  |                 | [facility]  |              |
| Source      |                          |                 | [facility]    |              |
|             |                          |                 | MEDICAL     |              |
|             |                          |                 | CENTER -    |              |
|             |                          |                 | LABORATORY  |              |
+-------------+--------------------------+-----------------+-------------+--------------+
| BF Color    | Colorless (A)            | (none)          | [facility]  |              |
|             |                          |                 | [facility]    |              |
|             |                          |                 | MEDICAL     |              |
|             |                          |                 | CENTER -    |              |
|             |                          |                 | LABORATORY  |              |
 
+-------------+--------------------------+-----------------+-------------+--------------+
| BF          | Clear                    | Clear           | [facility]  |              |
| Appearance  |                          |                 | [facility]    |              |
|             |                          |                 | MEDICAL     |              |
|             |                          |                 | CENTER -    |              |
|             |                          |                 | LABORATORY  |              |
+-------------+--------------------------+-----------------+-------------+--------------+
| BF          | 28                       | 0 - 150         | [facility]  |              |
| Nucleated   |                          | cells/uL        | [facility]    |              |
| cells       |                          |                 | MEDICAL     |              |
|             |                          |                 | CENTER -    |              |
|             |                          |                 | LABORATORY  |              |
+-------------+--------------------------+-----------------+-------------+--------------+
| BF RBC      | <2000                    | Reference Range | [facility]  |              |
|             |                          |  Not            | [facility]    |              |
|             |                          | Established     | MEDICAL     |              |
|             |                          | cells/uL        | CENTER -    |              |
|             |                          |                 | LABORATORY  |              |
+-------------+--------------------------+-----------------+-------------+--------------+
| BF %        | 10Comment: This is an    | %               | [facility]  |              |
| Neutrophils | appended report.  These  |                 | [facility]    |              |
|             | results have been        |                 | MEDICAL     |              |
|             | appended to a previously |                 | CENTER -    |              |
|             |  final verified report.  |                 | LABORATORY  |              |
+-------------+--------------------------+-----------------+-------------+--------------+
| BF %        | 86Comment: This is an    | %               | [facility]  |              |
| Lymphocytes | appended report.  These  |                 | [facility]    |              |
|             | results have been        |                 | MEDICAL     |              |
|             | appended to a previously |                 | CENTER -    |              |
|             |  final verified report.  |                 | LABORATORY  |              |
+-------------+--------------------------+-----------------+-------------+--------------+
| BF %        | 4Comment: This is an     | %               | [facility]  |              |
| Macro/Mono  | appended report.  These  |                 | [facility]    |              |
|             | results have been        |                 | MEDICAL     |              |
|             | appended to a previously |                 | CENTER -    |              |
|             |  final verified report.  |                 | LABORATORY  |              |
+-------------+--------------------------+-----------------+-------------+--------------+
| BF Total    | 100Comment: This is an   |                 | [facility]  |              |
| Cells       | appended report.  These  |                 | [facility]    |              |
| counted     | results have been        |                 | MEDICAL     |              |
|             | appended to a previously |                 | CENTER -    |              |
|             |  final verified report.  |                 | LABORATORY  |              |
+-------------+--------------------------+-----------------+-------------+--------------+
| %           | 87.0                     | %               | [facility]  |              |
| Mononuclear |                          |                 | [facility]    |              |
|  Cells,     |                          |                 | MEDICAL     |              |
| Body Fluid  |                          |                 | CENTER -    |              |
|             |                          |                 | LABORATORY  |              |
+-------------+--------------------------+-----------------+-------------+--------------+
| BF          | 13.0                     | %               | [facility]  |              |
| Polynuclear |                          |                 | [facility]    |              |
|  %          |                          |                 | MEDICAL     |              |
|             |                          |                 | CENTER -    |              |
|             |                          |                 | LABORATORY  |              |
+-------------+--------------------------+-----------------+-------------+--------------+
 
 
 
+----------------------+
| Specimen             |
 
+----------------------+
| Body Fluid - [facility] |
|  fluid sample        |
| (specimen)           |
+----------------------+
 
 
 
+----------------------+--------------------+--------------------+----------------+
| Performing           | Address            | City/State/Zipcode | Phone Number   |
| Organization         |                    |                    |                |
+----------------------+--------------------+--------------------+----------------+
|   [facility]     |   [address] |   [place]  |   [phone] |
| [facility]  |                    | [address]              |                |
| - LABORATORY         |                    |                    |                |
+----------------------+--------------------+--------------------+----------------+
 Culture, Body Fluid, Anaerobe (2020  3:25 PM PST)
 
+-----------+------------------------+-----------+-------------+--------------+
| Component | Value                  | Ref Range | Performed   | Pathologist  |
|           |                        |           | At          | Signature    |
+-----------+------------------------+-----------+-------------+--------------+
| Culture   | No anaerobes isolated. |           | [facility]  |              |
|           |                        |           | [facility]    |              |
|           |                        |           | MEDICAL     |              |
|           |                        |           | CENTER -    |              |
|           |                        |           | LABORATORY  |              |
+-----------+------------------------+-----------+-------------+--------------+
 
 
 
+----------------------+
| Specimen             |
+----------------------+
| Body Fluid - [facility] |
|  fluid sample        |
| (specimen)           |
+----------------------+
 
 
 
+----------------------+--------------------+--------------------+----------------+
| Performing           | Address            | City/State/Zipcode | Phone Number   |
| Organization         |                    |                    |                |
+----------------------+--------------------+--------------------+----------------+
|   [facility]     |   [address] |   [place]  |   [phone] |
| [facility]  |                    | [address]              |                |
| - LABORATORY         |                    |                    |                |
+----------------------+--------------------+--------------------+----------------+
 Culture, Body Fluid, Aerobe (2020  3:25 PM PST)
 
+-------------+----------------------+-----------+-------------+--------------+
| Component   | Value                | Ref Range | Performed   | Pathologist  |
|             |                      |           | At          | Signature    |
+-------------+----------------------+-----------+-------------+--------------+
| Culture     | No Growth            |           | [facility]  |              |
|             |                      |           | [facility]    |              |
|             |                      |           | MEDICAL     |              |
|             |                      |           | CENTER -    |              |
|             |                      |           | LABORATORY  |              |
 
+-------------+----------------------+-----------+-------------+--------------+
| Gram Stain  | 2+ White Blood Cells |           | [facility]  |              |
| Result      |                      |           | [facility]    |              |
|             |                      |           | MEDICAL     |              |
|             |                      |           | CENTER -    |              |
|             |                      |           | LABORATORY  |              |
+-------------+----------------------+-----------+-------------+--------------+
| Gram Stain  | No organisms seen    |           | [facility]  |              |
| Result      |                      |           | [facility]    |              |
|             |                      |           | MEDICAL     |              |
|             |                      |           | CENTER -    |              |
|             |                      |           | LABORATORY  |              |
+-------------+----------------------+-----------+-------------+--------------+
 
 
 
+----------------------+
| Specimen             |
+----------------------+
| Body Fluid - [facility] |
|  fluid sample        |
| (specimen)           |
+----------------------+
 
 
 
+----------------------+--------------------+--------------------+----------------+
| Performing           | Address            | City/State/Zipcode | Phone Number   |
| Organization         |                    |                    |                |
+----------------------+--------------------+--------------------+----------------+
|   [facility]     |   [address] |   [place]  |   [phone] |
| [facility]  |                    | [address]              |                |
| - LABORATORY         |                    |                    |                |
+----------------------+--------------------+--------------------+----------------+
 Protein, Body Fluid (2020  3:25 PM PST)
 
+------------+---------+-----------+-------------+--------------+
| Component  | Value   | Ref Range | Performed   | Pathologist  |
|            |         |           | At          | Signature    |
+------------+---------+-----------+-------------+--------------+
| Protein,   | <2.0    | g/dL      | [facility]  |              |
| Body Fluid |         |           | [facility]    |              |
|            |         |           | MEDICAL     |              |
|            |         |           | CENTER -    |              |
|            |         |           | LABORATORY  |              |
+------------+---------+-----------+-------------+--------------+
| Specimen   | [facility] |           | [facility]  |              |
| Source     |         |           | [facility]    |              |
|            |         |           | MEDICAL     |              |
|            |         |           | CENTER -    |              |
|            |         |           | LABORATORY  |              |
+------------+---------+-----------+-------------+--------------+
 
 
 
+----------------------+
| Specimen             |
+----------------------+
| Body Fluid - [facility] |
|  fluid sample        |
 
| (specimen)           |
+----------------------+
 
 
 
+----------------------+--------------------+--------------------+----------------+
| Performing           | Address            | City/State/Zipcode | Phone Number   |
| Organization         |                    |                    |                |
+----------------------+--------------------+--------------------+----------------+
|   [facility]     |   [address] |   [place]  |   [phone] |
| [facility]  |                    | [address]              |                |
| - LABORATORY         |                    |                    |                |
+----------------------+--------------------+--------------------+----------------+
 Lactate Dehydrogenase, Body Fluid (2020  3:25 PM PST)
 
+-------------+---------+-----------+-------------+--------------+
| Component   | Value   | Ref Range | Performed   | Pathologist  |
|             |         |           | At          | Signature    |
+-------------+---------+-----------+-------------+--------------+
| Lactate     | 56      | U/L       | [facility]  |              |
| Dehydrogena |         |           | [facility]    |              |
| se, Body    |         |           | MEDICAL     |              |
| Fluid       |         |           | CENTER -    |              |
|             |         |           | LABORATORY  |              |
+-------------+---------+-----------+-------------+--------------+
| Specimen    | [facility] |           | [facility]  |              |
| Source      |         |           | [facility]    |              |
|             |         |           | MEDICAL     |              |
|             |         |           | CENTER -    |              |
|             |         |           | LABORATORY  |              |
+-------------+---------+-----------+-------------+--------------+
 
 
 
+----------------------+
| Specimen             |
+----------------------+
| Body Fluid - [facility] |
|  fluid sample        |
| (specimen)           |
+----------------------+
 
 
 
+----------------------+--------------------+--------------------+----------------+
| Performing           | Address            | City/State/Zipcode | Phone Number   |
| Organization         |                    |                    |                |
+----------------------+--------------------+--------------------+----------------+
|   [facility]     |   [address] |   [place]  |   [phone] |
| [facility]  |                    | [address]              |                |
| - LABORATORY         |                    |                    |                |
+----------------------+--------------------+--------------------+----------------+
 Glucose, Body Fluid (2020  3:25 PM PST)
 
+------------+---------+-----------+-------------+--------------+
| Component  | Value   | Ref Range | Performed   | Pathologist  |
|            |         |           | At          | Signature    |
+------------+---------+-----------+-------------+--------------+
| Glucose,   | 177     | mg/dL     | [facility]  |              |
| Body Fluid |         |           | [facility]    |              |
 
|            |         |           | MEDICAL     |              |
|            |         |           | CENTER -    |              |
|            |         |           | LABORATORY  |              |
+------------+---------+-----------+-------------+--------------+
| Specimen   | [facility] |           | [facility]  |              |
| Source     |         |           | [facility]    |              |
|            |         |           | MEDICAL     |              |
|            |         |           | CENTER -    |              |
|            |         |           | LABORATORY  |              |
+------------+---------+-----------+-------------+--------------+
 
 
 
+----------------------+
| Specimen             |
+----------------------+
| Body Fluid - [facility] |
|  fluid sample        |
| (specimen)           |
+----------------------+
 
 
 
+----------------------+--------------------+--------------------+----------------+
| Performing           | Address            | City/State/Zipcode | Phone Number   |
| Organization         |                    |                    |                |
+----------------------+--------------------+--------------------+----------------+
|   [facility]     |   [address] |   [place]  |   [phone] |
| [facility]  |                    | [address]              |                |
| - LABORATORY         |                    |                    |                |
+----------------------+--------------------+--------------------+----------------+
 Albumin, Body Fluid (2020  3:25 PM PST)
 
+------------+---------+-----------+-------------+--------------+
| Component  | Value   | Ref Range | Performed   | Pathologist  |
|            |         |           | At          | Signature    |
+------------+---------+-----------+-------------+--------------+
| Albumin,   | <1.0    | g/dL      | [facility]  |              |
| Body Fluid |         |           | [facility]    |              |
|            |         |           | MEDICAL     |              |
|            |         |           | CENTER -    |              |
|            |         |           | LABORATORY  |              |
+------------+---------+-----------+-------------+--------------+
| Specimen   | [facility] |           | [facility]  |              |
| Source     |         |           | [facility]    |              |
|            |         |           | MEDICAL     |              |
|            |         |           | CENTER -    |              |
|            |         |           | LABORATORY  |              |
+------------+---------+-----------+-------------+--------------+
 
 
 
+----------------------+
| Specimen             |
+----------------------+
| Body Fluid - [facility] |
|  fluid sample        |
| (specimen)           |
+----------------------+
 
 
 
 
+----------------------+--------------------+--------------------+----------------+
| Performing           | Address            | City/State/Zipcode | Phone Number   |
| Organization         |                    |                    |                |
+----------------------+--------------------+--------------------+----------------+
|   [facility]     |   [address] |   [place]  |   [phone] |
| [facility]  |                    | [address]              |                |
| - LABORATORY         |                    |                    |                |
+----------------------+--------------------+--------------------+----------------+
 US Guided Paracentesis (2020  3:24 PM PST)
 
+----------+
| Specimen |
+----------+
|          |
+----------+
 
 
 
+------------------------------------------------------------------------+---------------+
| Impressions                                                            | Performed At  |
+------------------------------------------------------------------------+---------------+
|   Technically successful ultrasound guided paracentesis.     Dictated  |   [facility] |
| and Signed by: Prasanth Morejon MD    Electronically signed: 2020  |               |
| 5:12 PM                                                                |               |
+------------------------------------------------------------------------+---------------+
 
 
 
+------------------------------------------------------------------------+---------------+
| Narrative                                                              | Performed At  |
+------------------------------------------------------------------------+---------------+
|      EXAM: Ultrasound guided paracentesis.     CLINICAL INFORMATION:   |   [facility] |
| [facility]     COMPARISON: 2020.     PROCEDURE:  Informed consent was |               |
|  obtained and a final timeout was performed.      Preliminary          |               |
| ultrasound showed a peritoneal fluid collection. The skin was marked   |               |
| and sterilely prepped and draped. 10 mL Lidocaine 1% infiltrated into  |               |
| the  subcutaneous soft tissues. A small skin incision was made. 8      |               |
| French paracentesis  needle and sheath was advanced under ultrasound   |               |
| guidance into the peritoneal  fluid. The needle was removed. The       |               |
| catheter was fixed to suction.     Aspirate:   5000 ml of cloudy       |               |
| serous fluid.      The catheter was removed and a sterile dressing was |               |
|  place.      The patient tolerated procedure well without              |               |
| complication.      FINDINGS: Moderate [facility]ites.                         |               |
+------------------------------------------------------------------------+---------------+
 
 
 
+----------------------------------------------------------------------------------+
| Procedure Note                                                                   |
+----------------------------------------------------------------------------------+
|   Quinn Flores Results In - 2020  5:15 PM PST                                  |
| EXAM: Ultrasound guided paracentesis.                                            |
|                                                                                  |
| CLINICAL INFORMATION: [facility]                                                    |
|                                                                                  |
| COMPARISON: 2020.                                                            |
|                                                                                  |
| PROCEDURE:                                                                       |
 
| Informed consent was obtained and a final timeout was performed.                 |
|                                                                                  |
| Preliminary ultrasound showed a peritoneal fluid collection. The skin was marked |
| and sterilely prepped and draped. 10 mL Lidocaine 1% infiltrated into the        |
| subcutaneous soft tissues. A small skin incision was made. 8 French paracentesis |
| needle and sheath was advanced under ultrasound guidance into the peritoneal     |
| fluid. The needle was removed. The catheter was fixed to suction.                |
|                                                                                  |
| Aspirate:  5000 ml of cloudy serous fluid.                                       |
|                                                                                  |
| The catheter was removed and a sterile dressing was place.                       |
|                                                                                  |
| The patient tolerated procedure well without complication.                       |
|                                                                                  |
| FINDINGS: Moderate ascites.                                                      |
|                                                                                  |
| IMPRESSION:                                                                      |
| Technically successful ultrasound guided paracentesis.                           |
|                                                                                  |
| Dictated and Signed by: Prasanth Morejon MD                                       |
|  Electronically signed: 2020 5:12 PM                                         |
+----------------------------------------------------------------------------------+
 
 
 
+---------------+---------+--------------------+--------------+
| Performing    | Address | City/State/Zipcode | Phone Number |
| Organization  |         |                    |              |
+---------------+---------+--------------------+--------------+
|   [facility] |         |                    |              |
+---------------+---------+--------------------+--------------+
 NM Nuclear Stress Test (Vasodilator) (2020 12:16 PM PST)
 
+-------------+--------+-----------+-------------+--------------+
| Component   | Value  | Ref Range | Performed   | Pathologist  |
|             |        |           | At          | Signature    |
+-------------+--------+-----------+-------------+--------------+
| BASELINE    | 74     | bpm       | [facility] |              |
| HEART RATE  |        |           |             |              |
+-------------+--------+-----------+-------------+--------------+
| BASELINE    | 135/62 | mmHg      | [facility] |              |
| BLOOD       |        |           |             |              |
| PRESSURE    |        |           |             |              |
+-------------+--------+-----------+-------------+--------------+
| PEAK HEART  | 74     |           | [facility] |              |
| RATE        |        |           |             |              |
+-------------+--------+-----------+-------------+--------------+
| PEAK BLOOD  | 135/62 | mmHG      | [facility] |              |
| PRESSURE    |        |           |             |              |
+-------------+--------+-----------+-------------+--------------+
| Target HR   | 130    |           | [facility] |              |
+-------------+--------+-----------+-------------+--------------+
| Percent HR  | 48     |           | [facility] |              |
+-------------+--------+-----------+-------------+--------------+
| Max         | 153    |           | [facility] |              |
| Predicted   |        |           |             |              |
| HR          |        |           |             |              |
+-------------+--------+-----------+-------------+--------------+
| LVEF-SPECT  | 58     | %         | [facility] |              |
| NUCLEAR     |        |           |             |              |
 
| STRESS/VIAB |        |           |             |              |
| ILITY       |        |           |             |              |
+-------------+--------+-----------+-------------+--------------+
| ST          | 0.0    | mm        | [facility] |              |
| Elevation   |        |           |             |              |
| (mm)        |        |           |             |              |
+-------------+--------+-----------+-------------+--------------+
 
 
 
+----------+
| Specimen |
+----------+
|          |
+----------+
 
 
 
+----------------------------------------------------------------------+---------------+
| Narrative                                                            | Performed At  |
+----------------------------------------------------------------------+---------------+
|   This result has an attachment that is not available.  1.           |   [facility] |
|   Persantine EKG is negative.2.   Normal Persantine sestamibi        |               |
| myocardial perfusion imaging study.   Normal left ventricular size,  |               |
| wall thickness and motion.   Preserved left ventricular systolic     |               |
| function.   LVEF by gated SPECT is 58%.   Evidence of                |               |
| diaphragmatic/inferior wall soft tissue attenuation.                 |               |
+----------------------------------------------------------------------+---------------+
 
 
 
+---------------+---------+--------------------+--------------+
| Performing    | Address | City/State/Zipcode | Phone Number |
| Organization  |         |                    |              |
+---------------+---------+--------------------+--------------+
|   [facility] |         |                    |              |
+---------------+---------+--------------------+--------------+
 POC Glucose (2020 12:02 PM PST)
 
+-----------+---------+----------------+-------------+--------------+
| Component | Value   | Ref Range      | Performed   | Pathologist  |
|           |         |                | At          | Signature    |
+-----------+---------+----------------+-------------+--------------+
| Glucose,  | 114 (H) | 70 - 109 mg/dL | [facility]  |              |
| POC       |         |                | [facility]    |              |
|           |         |                | [facility]     |              |
|           |         |                | [facility] -    |              |
|           |         |                | LABORATORY  |              |
+-----------+---------+----------------+-------------+--------------+
 
 
 
+----------+
| Specimen |
+----------+
| Blood    |
+----------+
 
 
 
 
+----------------------+--------------------+--------------------+----------------+
| Performing           | Address            | City/State/Zipcode | Phone Number   |
| Organization         |                    |                    |                |
+----------------------+--------------------+--------------------+----------------+
|   [facility]     |   [address] |   [place]  |   [phone] |
| [facility]  |                    | [address]              |                |
| - LABORATORY         |                    |                    |                |
+----------------------+--------------------+--------------------+----------------+
 ECHO Complete (2020  8:24 AM PST)
 
+-------------+--------+-----------+-------------+--------------+
| Component   | Value  | Ref Range | Performed   | Pathologist  |
|             |        |           | At          | Signature    |
+-------------+--------+-----------+-------------+--------------+
| Ascending   | 3.16   | cm        | [facility] |              |
| aorta       |        |           |             |              |
+-------------+--------+-----------+-------------+--------------+
| AV mean     | 10.69  | mmHg      | [facility] |              |
| gradient    |        |           |             |              |
+-------------+--------+-----------+-------------+--------------+
| Aortic      | 0.91   | cm2       | [facility] |              |
| Valve Area  |        |           |             |              |
| by          |        |           |             |              |
| Continuity  |        |           |             |              |
| VTI         |        |           |             |              |
+-------------+--------+-----------+-------------+--------------+
| LVOT        | 1.96   | cm        | [facility] |              |
| diameter    |        |           |             |              |
+-------------+--------+-----------+-------------+--------------+
| LVOT peak   | 60.62  | cm/s      | [facility] |              |
| katelyn         |        |           |             |              |
+-------------+--------+-----------+-------------+--------------+
| LVOT peak   | 14.04  | cm        | [facility] |              |
| VTI         |        |           |             |              |
+-------------+--------+-----------+-------------+--------------+
| AV peak katelyn | 212.74 | cm/s      | [facility] |              |
+-------------+--------+-----------+-------------+--------------+
| AV  VTI     | 46.62  | cm        | [facility] |              |
+-------------+--------+-----------+-------------+--------------+
| AV peak     | 18.1   | mmHg      | [facility] |              |
| gradient    |        |           |             |              |
+-------------+--------+-----------+-------------+--------------+
| AV LVOT     | 1.47   | mmHg      | [facility] |              |
| Peak        |        |           |             |              |
| Gradient    |        |           |             |              |
+-------------+--------+-----------+-------------+--------------+
| AV LVOT     | 0.65   | mmHg      | [facility] |              |
| Mean        |        |           |             |              |
| Gradient    |        |           |             |              |
+-------------+--------+-----------+-------------+--------------+
| TR Peak     | 20     | mmHg      | [facility] |              |
| Gradient    |        |           |             |              |
+-------------+--------+-----------+-------------+--------------+
| TR Velocity | 225.58 | cm        | [facility] |              |
+-------------+--------+-----------+-------------+--------------+
| LV          | 55     | %         | [facility] |              |
| Rodgers's   |        |           |             |              |
| Biplane EF  |        |           |             |              |
+-------------+--------+-----------+-------------+--------------+
| LVOT Mean   | 37.1   | cm/s      | [facility] |              |
 
| Velocity    |        |           |             |              |
+-------------+--------+-----------+-------------+--------------+
| MV          | 224.77 | msec      | [facility] |              |
| Deceleratio |        |           |             |              |
| n Time      |        |           |             |              |
+-------------+--------+-----------+-------------+--------------+
| MV Peak     | 111.33 | cm/s      | [facility] |              |
| E-Wave      |        |           |             |              |
+-------------+--------+-----------+-------------+--------------+
| AV Mean     | 155.47 | cm/s      | [facility] |              |
| Velocity    |        |           |             |              |
+-------------+--------+-----------+-------------+--------------+
| LA Major    | 0.4662 | cm        | [facility] |              |
+-------------+--------+-----------+-------------+--------------+
| Cardiac     | 3.09   | l/min     | [facility] |              |
| Output      |        |           |             |              |
+-------------+--------+-----------+-------------+--------------+
| Vitals      | 73     |           | [facility] |              |
| Heart Rate  |        |           |             |              |
| Rest        |        |           |             |              |
+-------------+--------+-----------+-------------+--------------+
| Vitals BP   | 94     |           | [facility] |              |
| Systolic    |        |           |             |              |
+-------------+--------+-----------+-------------+--------------+
| Vitals BP   | 51     |           | [facility] |              |
| Diastolic   |        |           |             |              |
+-------------+--------+-----------+-------------+--------------+
| Vitals      | 165.1  |           | [facility] |              |
| Height      |        |           |             |              |
+-------------+--------+-----------+-------------+--------------+
| Vitals      | 81.70  |           | [facility] |              |
| Weight      |        |           |             |              |
+-------------+--------+-----------+-------------+--------------+
| Aortic Root | 2.25   | cm        | [facility] |              |
|  Diameter   |        |           |             |              |
+-------------+--------+-----------+-------------+--------------+
| LVEF-TTE    | 60     | %         | [facility] |              |
| TRANSTHORAC |        |           |             |              |
| IC ECHO     |        |           |             |              |
+-------------+--------+-----------+-------------+--------------+
| RA PRESSURE | 3      | mmHg      | [facility] |              |
+-------------+--------+-----------+-------------+--------------+
| RVSP        | 23     | mmHg      | [facility] |              |
| Estimated   |        |           |             |              |
+-------------+--------+-----------+-------------+--------------+
 
 
 
+----------+
| Specimen |
+----------+
|          |
+----------+
 
 
 
+------------------------------------------------------------------------+---------------+
| Narrative                                                              | Performed At  |
+------------------------------------------------------------------------+---------------+
|   This result has an attachment that is not available.  1.   Moderate  |   [facility] |
 
| biatrial dilatation.2.   Normal left ventricular size with a mild to   |               |
| moderate concentric left ventricular hypertrophy.   Left ventricular   |               |
| systolic function is preserved.  LVEF is 55 to 60%.3.   Pacemaker lead |               |
|  in the right ventricle.4.   Normally functioning bioprosthetic aortic |               |
|  valve replacement.   Peak velocity across aortic valve is 2.1 m/s,    |               |
| peak gradient of 18 mmHg, mean gradient of 10 mmHg.5.   Moderately     |               |
| thickened and calcified mitral valve with adequate opening.  Evidence  |               |
| of a heavily calcified chordae tendineae.6.   Moderate mitral annular  |               |
| calcification.7.   Normal right-sided pressure.8.   Normal IVC with a  |               |
| normal respiratory collapse.                                           |               |
|7.   Normal right-sided pressure.                                       |               |
|8.   Normal IVC with a normal respiratory collapse.                     |               |
+------------------------------------------------------------------------+---------------+
 
 
 
+---------------+---------+--------------------+--------------+
| Performing    | Address | City/State/Zipcode | Phone Number |
| Organization  |         |                    |              |
+---------------+---------+--------------------+--------------+
|   [facility] |         |                    |              |
+---------------+---------+--------------------+--------------+
 US Abdomen Limited (2020  7:32 AM PST)
 
+----------+
| Specimen |
+----------+
|          |
+----------+
 
 
 
+------------------------------------------------------------------------+---------------+
| Impressions                                                            | Performed At  |
+------------------------------------------------------------------------+---------------+
|      1.   STIGMATA OF CIRRHOSIS WITH SPLENOMEGALY AND ASCITES.     2.  |   [facility] |
|   CHOLELITHIASIS.     3.   RIGHT PLEURAL EFFUSION.     Dictated and    |               |
| Signed by: Emerson Adams MD    Electronically signed: 2020 8:56 AM |               |
|                                                                        |               |
+------------------------------------------------------------------------+---------------+
 
 
 
+------------------------------------------------------------------------+---------------+
| Narrative                                                              | Performed At  |
+------------------------------------------------------------------------+---------------+
|   LIMITED ULTRASOUND ABDOMEN 2020 7:08 AM     CLINICAL HISTORY:    |   [facility] |
| ascites, history of cirrhosis     COMPARISON: None available           |               |
| FINDINGS: The pancreas is somewhat echogenic but otherwise             |               |
| unremarkable.   The  liver measures 15.5 cm in craniocaudal dimension. |               |
|    The liver demonstrates  lobular contour and apparent caudate lobe   |               |
| enlargement, consistent with  cirrhosis.   No hepatic mass is          |               |
| identified.   The gallbladder contains echogenic,  shadowing material  |               |
| potentially reflecting a combination of gallstones and  sludge,        |               |
| without associated wall thickening.   A 4 mm rounded, echogenic,       |               |
| nondependent structure along the margin of the gallbladder lumen may   |               |
| reflect a  polyp.   Sonographic Rodriguez sign is reportedly negative.    |               |
|   The common duct  measures up to 5 mm in diameter.   The right kidney |               |
|  measures 11 cm long axis and  is unremarkable, without                |               |
| hydronephrosis.   The spleen is enlarged up to a long  axis of 16.1    |               |
 
| cm.   Moderate anechoic ascites is visible.   A right pleural          |               |
| effusion is also noted.   The portal and hepatic veins are patent and  |               |
| demonstrate  appropriately directed flow.                              |               |
+------------------------------------------------------------------------+---------------+
 
 
 
+------------------------------------------------------------------------------------------+
| Procedure Note                                                                           |
+------------------------------------------------------------------------------------------+
|   Mark, Rad Results In - 2020  8:59 AM PST  LIMITED ULTRASOUND ABDOMEN 2020     |
| 7:08 AMCLINICAL HISTORY: ascites, history of cirrhosisCOMPARISON: None                   |
| availableFINDINGS: The pancreas is somewhat echogenic but otherwise unremarkable.        |
| Theliver measures 15.5 cm in craniocaudal dimension.  The liver demonstrateslobular      |
| contour and apparent caudate lobe enlargement, consistent withcirrhosis.  No hepatic     |
| mass is identified.  The gallbladder contains echogenic,shadowing material potentially   |
| reflecting a combination of gallstones andsludge, without associated wall thickening.  A |
|  4 mm rounded, echogenic,nondependent structure along the margin of the gallbladder      |
| lumen may reflect apolyp.  Sonographic Rodriguez sign is reportedly negative.  The common   |
| ductmeasures up to 5 mm in diameter.  The right kidney measures 11 cm long axis andis    |
| unremarkable, without hydronephrosis.  The spleen is enlarged up to a longaxis of 16.1   |
| cm.  Moderate anechoic ascites is visible.  A right pleuraleffusion is also noted.  The  |
| portal and hepatic veins are patent and demonstrateappropriately directed                |
| flow.IMPRESSION: 1.  STIGMATA OF CIRRHOSIS WITH SPLENOMEGALY AND ASCITES.2.              |
| CHOLELITHIASIS.3.  RIGHT PLEURAL EFFUSION.Dictated and Signed by: Emerson Adams MD        |
| Electronically signed: 2020 8:56 AM                                                  |
|axis of 16.1 cm.  Moderate anechoic ascites is visible.  A right pleural                  |
|effusion is also noted.  The portal and hepatic veins are patent and demonstrate          |
|appropriately directed flow.                                                              |
|                                                                                          |
|IMPRESSION:                                                                               |
|                                                                                          |
|1.  STIGMATA OF CIRRHOSIS WITH SPLENOMEGALY AND ASCITES.                                  |
|                                                                                          |
|2.  CHOLELITHIASIS.                                                                       |
|                                                                                          |
|3.  RIGHT PLEURAL EFFUSION.                                                               |
|                                                                                          |
|Dictated and Signed by: Emerson Adams MD                                                   |
| Electronically signed: 2020 8:56 AM                                                  |
+------------------------------------------------------------------------------------------+
 
 
 
+---------------+---------+--------------------+--------------+
| Performing    | Address | City/State/Zipcode | Phone Number |
| Organization  |         |                    |              |
+---------------+---------+--------------------+--------------+
|   [facility] |         |                    |              |
+---------------+---------+--------------------+--------------+
 POC Glucose (2020  6:39 AM PST)
 
+-----------+---------+----------------+-------------+--------------+
| Component | Value   | Ref Range      | Performed   | Pathologist  |
|           |         |                | At          | Signature    |
+-----------+---------+----------------+-------------+--------------+
| Glucose,  | 188 (H) | 70 - 109 mg/dL | [facility]  |              |
| POC       |         |                | [facility]    |              |
|           |         |                | [facility]     |              |
|           |         |                | [facility] -    |              |
 
|           |         |                | LABORATORY  |              |
+-----------+---------+----------------+-------------+--------------+
 
 
 
+----------+
| Specimen |
+----------+
| Blood    |
+----------+
 
 
 
+----------------------+--------------------+--------------------+----------------+
| Performing           | Address            | City/State/Zipcode | Phone Number   |
| Organization         |                    |                    |                |
+----------------------+--------------------+--------------------+----------------+
|   [facility]     |   [address] |   [place]  |   [phone] |
| [facility]  |                    | [address]              |                |
| - LABORATORY         |                    |                    |                |
+----------------------+--------------------+--------------------+----------------+
 Comprehensive Metabolic Panel (2020  6:02 AM PST)
 
+-------------+--------------------------+-----------------+-------------+--------------+
| Component   | Value                    | Ref Range       | Performed   | Pathologist  |
|             |                          |                 | At          | Signature    |
+-------------+--------------------------+-----------------+-------------+--------------+
| Na          | 135 (L)                  | 136 - 145       | [facility]  |              |
|             |                          | mmol/L          | [facility]    |              |
|             |                          |                 | [facility]     |              |
|             |                          |                 | [facility] -    |              |
|             |                          |                 | LABORATORY  |              |
+-------------+--------------------------+-----------------+-------------+--------------+
| K           | 4.1                      | 3.4 - 5.1       | [facility]  |              |
|             |                          | mmol/L          | [facility]    |              |
|             |                          |                 | [facility]     |              |
|             |                          |                 | CENTER -    |              |
|             |                          |                 | LABORATORY  |              |
+-------------+--------------------------+-----------------+-------------+--------------+
| Cl          | 105                      | 98 - 107 mmol/L | [facility]  |              |
|             |                          |                 | [facility]    |              |
|             |                          |                 | [facility]     |              |
|             |                          |                 | CENTER -    |              |
|             |                          |                 | LABORATORY  |              |
+-------------+--------------------------+-----------------+-------------+--------------+
| CO2         | 28                       | 20 - 31 mmol/L  | [facility]  |              |
|             |                          |                 | [facility]    |              |
|             |                          |                 | [facility]     |              |
|             |                          |                 | CENTER -    |              |
|             |                          |                 | LABORATORY  |              |
+-------------+--------------------------+-----------------+-------------+--------------+
| Anion Gap   | 2 (L)                    | 3 - 16 mmol/L   | [facility]  |              |
|             |                          |                 | [facility]    |              |
|             |                          |                 | [facility]     |              |
|             |                          |                 | CENTER -    |              |
|             |                          |                 | LABORATORY  |              |
+-------------+--------------------------+-----------------+-------------+--------------+
| Glucose     | 188 (H)                  | 60 - 106 mg/dL  | [facility]  |              |
|             |                          |                 | [facility]    |              |
|             |                          |                 | [facility]     |              |
 
|             |                          |                 | CENTER -    |              |
|             |                          |                 | LABORATORY  |              |
+-------------+--------------------------+-----------------+-------------+--------------+
| BUN         | 22                       | 9 - 23 mg/dL    | [facility]  |              |
|             |                          |                 | [facility]    |              |
|             |                          |                 | [facility]     |              |
|             |                          |                 | CENTER -    |              |
|             |                          |                 | LABORATORY  |              |
+-------------+--------------------------+-----------------+-------------+--------------+
| Creatinine  | 1.12                     | 0.70 - 1.30     | [facility]  |              |
|             |                          | mg/dL           | [facility]    |              |
|             |                          |                 | [facility]     |              |
|             |                          |                 | CENTER -    |              |
|             |                          |                 | LABORATORY  |              |
+-------------+--------------------------+-----------------+-------------+--------------+
| eGFR if not | >60Comment: GLOMERULAR   | >=60            | [facility]  |              |
|      | FILTRATION               | mL/min/1.73m2   | [facility]    |              |
| [employer]    | RATE,ESTIMATED           |                 | MEDICAL     |              |
|             |   mL/min/1.65s3Xzgr than |                 | CENTER -    |              |
|             |  60    Chronic kidney    |                 | LABORATORY  |              |
|             | disease,if found over a  |                 |             |              |
|             | 3-month period.Less than |                 |             |              |
|             |  15    Kidney failureFor |                 |             |              |
|             |                   |                 |             |              |
|             | Americans,multiply the   |                 |             |              |
|             | calculated GFR by 1.21.  |                 |             |              |
|             |                          |                 |             |              |
+-------------+--------------------------+-----------------+-------------+--------------+
| Calcium     | 7.1 (L)                  | 8.7 - 10.4      | [facility]  |              |
|             |                          | mg/dL           | [facility]    |              |
|             |                          |                 | [facility]     |              |
|             |                          |                 | CENTER -    |              |
|             |                          |                 | LABORATORY  |              |
+-------------+--------------------------+-----------------+-------------+--------------+
| Albumin     | 1.8 (L)                  | 3.2 - 4.8 g/dL  | [facility]  |              |
|             |                          |                 | [facility]    |              |
|             |                          |                 | [facility]     |              |
|             |                          |                 | CENTER -    |              |
|             |                          |                 | LABORATORY  |              |
+-------------+--------------------------+-----------------+-------------+--------------+
| Bilirubin   | 0.5                      | 0.3 - 1.2 mg/dL | [facility]  |              |
| Total       |                          |                 | [facility]    |              |
|             |                          |                 | [facility]     |              |
|             |                          |                 | CENTER -    |              |
|             |                          |                 | LABORATORY  |              |
+-------------+--------------------------+-----------------+-------------+--------------+
| Total       | 5.1 (L)                  | 5.7 - 8.2 g/dL  | [facility]  |              |
| Protein     |                          |                 | [facility]    |              |
|             |                          |                 | MEDICAL     |              |
|             |                          |                 | CENTER -    |              |
|             |                          |                 | LABORATORY  |              |
+-------------+--------------------------+-----------------+-------------+--------------+
| AST         | 34                       | 0 - 34 U/L      | [facility]  |              |
|             |                          |                 | [facility]    |              |
|             |                          |                 | [facility]ICAL     |              |
|             |                          |                 | CENTER -    |              |
|             |                          |                 | LABORATORY  |              |
+-------------+--------------------------+-----------------+-------------+--------------+
| ALT         | 23                       | 10 - 49 U/L     | [facility]  |              |
|             |                          |                 | [facility]    |              |
 
|             |                          |                 | MEDICAL     |              |
|             |                          |                 | CENTER -    |              |
|             |                          |                 | LABORATORY  |              |
+-------------+--------------------------+-----------------+-------------+--------------+
| Alkaline    | 90                       | 46 - 116 U/L    | [facility]  |              |
| Phosphatase |                          |                 | [facility]    |              |
|             |                          |                 | MEDICAL     |              |
|             |                          |                 | CENTER -    |              |
|             |                          |                 | LABORATORY  |              |
+-------------+--------------------------+-----------------+-------------+--------------+
| Globulin    | 3.3                      | 2.1 - 3.8 g/dL  | [facility]  |              |
|             |                          |                 | [facility]    |              |
|             |                          |                 | [facility]     |              |
|             |                          |                 | CENTER -    |              |
|             |                          |                 | LABORATORY  |              |
+-------------+--------------------------+-----------------+-------------+--------------+
| Albumin/Kassy | 0.5 (L)                  | 0.8 - 1.9       | [facility]  |              |
| bulin Ratio |                          |                 | [facility]    |              |
|             |                          |                 | [facility]ICAL     |              |
|             |                          |                 | CENTER -    |              |
|             |                          |                 | LABORATORY  |              |
+-------------+--------------------------+-----------------+-------------+--------------+
| BUN/Creatin | 19.6                     |                 | [facility]  |              |
| ine Ratio   |                          |                 | [facility]    |              |
|             |                          |                 | MEDICAL     |              |
|             |                          |                 | CENTER -    |              |
|             |                          |                 | LABORATORY  |              |
+-------------+--------------------------+-----------------+-------------+--------------+
 
 
 
+----------+
| Specimen |
+----------+
| Blood    |
+----------+
 
 
 
+----------------------+--------------------+--------------------+----------------+
| Performing           | Address            | City/State/Zipcode | Phone Number   |
| Organization         |                    |                    |                |
+----------------------+--------------------+--------------------+----------------+
|   [facility]     |   [address] |   [place]  |   [phone] |
| [facility]  |                    | [phone]              |                |
| - LABORATORY         |                    |                    |                |
+----------------------+--------------------+--------------------+----------------+
 Magnesium (2020  6:02 AM PST)
 
+-----------+-------+-----------------+-------------+--------------+
| Component | Value | Ref Range       | Performed   | Pathologist  |
|           |       |                 | At          | Signature    |
+-----------+-------+-----------------+-------------+--------------+
| Magnesium | 2.1   | 1.6 - 2.6 mg/dL | [facility]  |              |
|           |       |                 | [facility]    |              |
|           |       |                 | MEDICAL     |              |
|           |       |                 | CENTER -    |              |
|           |       |                 | LABORATORY  |              |
+-----------+-------+-----------------+-------------+--------------+
 
 
 
 
+----------+
| Specimen |
+----------+
| Blood    |
+----------+
 
 
 
+----------------------+--------------------+--------------------+----------------+
| Performing           | Address            | City/State/Zipcode | Phone Number   |
| Organization         |                    |                    |                |
+----------------------+--------------------+--------------------+----------------+
|   [facility]     |   [address] |   [place]  |   [phone] |
| [facility]  |                    | [id number]              |                |
| - LABORATORY         |                    |                    |                |
+----------------------+--------------------+--------------------+----------------+
 CBC with Differential (2020  6:02 AM PST)
 
+-------------+--------------------------+-----------------+-------------+--------------+
| Component   | Value                    | Ref Range       | Performed   | Pathologist  |
|             |                          |                 | At          | Signature    |
+-------------+--------------------------+-----------------+-------------+--------------+
| WBC         | 3.2 (L)                  | 4.0 - 11.0 K/uL | [facility]  |              |
|             |                          |                 | [facility]    |              |
|             |                          |                 | [facility]     |              |
|             |                          |                 | CENTER -    |              |
|             |                          |                 | LABORATORY  |              |
+-------------+--------------------------+-----------------+-------------+--------------+
| RBC         | 2.85 (L)                 | 4.30 - 5.70     | [facility]  |              |
|             |                          | M/uL            | [facility]    |              |
|             |                          |                 | MEDICAL     |              |
|             |                          |                 | CENTER -    |              |
|             |                          |                 | LABORATORY  |              |
+-------------+--------------------------+-----------------+-------------+--------------+
| Hemoglobin  | 8.9 (L)                  | 13.5 - 18.0     | [facility]  |              |
|             |                          | g/dL            | [facility]    |              |
|             |                          |                 | MEDICAL     |              |
|             |                          |                 | CENTER -    |              |
|             |                          |                 | LABORATORY  |              |
+-------------+--------------------------+-----------------+-------------+--------------+
| Hematocrit  | 26.8 (L)                 | 40.0 - 51.0 %   | [facility]  |              |
|             |                          |                 | [facility]    |              |
|             |                          |                 | MEDICAL     |              |
|             |                          |                 | CENTER -    |              |
|             |                          |                 | LABORATORY  |              |
+-------------+--------------------------+-----------------+-------------+--------------+
| MCV         | 94.0                     | 83.0 - 101.0 fL | [facility]  |              |
|             |                          |                 | [facility]    |              |
|             |                          |                 | MEDICAL     |              |
|             |                          |                 | CENTER -    |              |
|             |                          |                 | LABORATORY  |              |
+-------------+--------------------------+-----------------+-------------+--------------+
| MCH         | 31.2                     | 28.0 - 35.0 pg  | [facility]  |              |
|             |                          |                 | [facility]    |              |
|             |                          |                 | MEDICAL     |              |
|             |                          |                 | CENTER -    |              |
|             |                          |                 | LABORATORY  |              |
+-------------+--------------------------+-----------------+-------------+--------------+
 
| MCHC        | 33.2                     | 32.0 - 36.0     | [facility]  |              |
|             |                          | g/dL            | [facility]    |              |
|             |                          |                 | MEDICAL     |              |
|             |                          |                 | CENTER -    |              |
|             |                          |                 | LABORATORY  |              |
+-------------+--------------------------+-----------------+-------------+--------------+
| RDW-CV      | 14.0                     | <15.0 %         | [facility]  |              |
|             |                          |                 | [facility]    |              |
|             |                          |                 | MEDICAL     |              |
|             |                          |                 | CENTER -    |              |
|             |                          |                 | LABORATORY  |              |
+-------------+--------------------------+-----------------+-------------+--------------+
| RDW-SD      | 48.1 (H)                 | 35.1 - 46.3 fL  | [facility]  |              |
|             |                          |                 | [facility]    |              |
|             |                          |                 | MED[facility]     |              |
|             |                          |                 | CENTER -    |              |
|             |                          |                 | LABORATORY  |              |
+-------------+--------------------------+-----------------+-------------+--------------+
| Platelet    | 87 (L)                   | 140 - 440 K/uL  | [facility]  |              |
| Count       |                          |                 | [facility]    |              |
|             |                          |                 | MEDICAL     |              |
|             |                          |                 | CENTER -    |              |
|             |                          |                 | LABORATORY  |              |
+-------------+--------------------------+-----------------+-------------+--------------+
| MPV         | 10.6                     | 6.5 - 12.4 fL   | [facility]  |              |
|             |                          |                 | [facility]    |              |
|             |                          |                 | MEDICAL     |              |
|             |                          |                 | CENTER -    |              |
|             |                          |                 | LABORATORY  |              |
+-------------+--------------------------+-----------------+-------------+--------------+
| Immature    | 2.4Comment: Low PLT +    | 0.9 - 11.2 %    | [facility]  |              |
| Platelet    | Low IPF are consistent   |                 | [facility]    |              |
| Fraction    | with a production        |                 | MEDICAL     |              |
|             | disorder. Low PLT + High |                 | CENTER -    |              |
|             |  IPF are consistent with |                 | LABORATORY  |              |
|             |  increased platelet      |                 |             |              |
|             | destruction.             |                 |             |              |
+-------------+--------------------------+-----------------+-------------+--------------+
| %           | 70.9                     | 45.0 - 82.0 %   | [facility]  |              |
| Neutrophils |                          |                 | [facility]    |              |
|             |                          |                 | MEDICAL     |              |
|             |                          |                 | CENTER -    |              |
|             |                          |                 | LABORATORY  |              |
+-------------+--------------------------+-----------------+-------------+--------------+
| %           | 21.9                     | 20.0 - 45.0 %   | [facility]  |              |
| Lymphocytes |                          |                 | [facility]    |              |
|             |                          |                 | MEDICAL     |              |
|             |                          |                 | CENTER -    |              |
|             |                          |                 | LABORATORY  |              |
+-------------+--------------------------+-----------------+-------------+--------------+
| % Monocytes | 6.3                      | 4.0 - 12.0 %    | [facility]  |              |
|             |                          |                 | [facility]    |              |
|             |                          |                 | MEDICAL     |              |
|             |                          |                 | CENTER -    |              |
|             |                          |                 | LABORATORY  |              |
+-------------+--------------------------+-----------------+-------------+--------------+
| %           | 0.3                      | 0.0 - 5.0 %     | [facility]  |              |
| Eosinophils |                          |                 | [facility]    |              |
|             |                          |                 | MEDICAL     |              |
|             |                          |                 | CENTER -    |              |
 
|             |                          |                 | LABORATORY  |              |
+-------------+--------------------------+-----------------+-------------+--------------+
| % Basophils | 0.3                      | 0.0 - 1.0 %     | [facility]  |              |
|             |                          |                 | [facility]    |              |
|             |                          |                 | MEDICAL     |              |
|             |                          |                 | CENTER -    |              |
|             |                          |                 | LABORATORY  |              |
+-------------+--------------------------+-----------------+-------------+--------------+
| % Immature  | 0.3                      | 0.0 - 0.4 %     | [facility]  |              |
| Granulocyte |                          |                 | [facility]    |              |
| s           |                          |                 | MEDICAL     |              |
|             |                          |                 | CENTER -    |              |
|             |                          |                 | LABORATORY  |              |
+-------------+--------------------------+-----------------+-------------+--------------+
| Absolute    | 2.26                     | 1.80 - 8.50     | [facility]  |              |
| Neutrophils |                          | K/uL            | [facility]    |              |
|             |                          |                 | MEDICAL     |              |
|             |                          |                 | CENTER -    |              |
|             |                          |                 | LABORATORY  |              |
+-------------+--------------------------+-----------------+-------------+--------------+
| Absolute    | 0.70                     | 0.60 - 3.20     | [facility]  |              |
| Lymphocytes |                          | K/uL            | [facility]    |              |
|             |                          |                 | MEDICAL     |              |
|             |                          |                 | CENTER -    |              |
|             |                          |                 | LABORATORY  |              |
+-------------+--------------------------+-----------------+-------------+--------------+
| Absolute    | 0.20                     | 0.00 - 1.00     | [facility]  |              |
| Monocytes   |                          | K/uL            | [facility]    |              |
|             |                          |                 | MED[facility]     |              |
|             |                          |                 | CENTER -    |              |
|             |                          |                 | LABORATORY  |              |
+-------------+--------------------------+-----------------+-------------+--------------+
| Absolute    | 0.01                     | 0.00 - 0.40     | [facility]  |              |
| Eosinophils |                          | K/uL            | [facility]    |              |
|             |                          |                 | MEDICAL     |              |
|             |                          |                 | CENTER -    |              |
|             |                          |                 | LABORATORY  |              |
+-------------+--------------------------+-----------------+-------------+--------------+
| Absolute    | 0.01                     | 0.00 - 0.10     | [facility]  |              |
| Basophils   |                          | K/uL            | [facility]    |              |
|             |                          |                 | MEDICAL     |              |
|             |                          |                 | CENTER -    |              |
|             |                          |                 | LABORATORY  |              |
+-------------+--------------------------+-----------------+-------------+--------------+
| Absolute    | 0.01                     | 0.00 - 0.03     | [facility]  |              |
| Immature    |                          | K/uL            | [facility]    |              |
| Granulocyte |                          |                 | MEDICAL     |              |
| s           |                          |                 | CENTER -    |              |
|             |                          |                 | LABORATORY  |              |
+-------------+--------------------------+-----------------+-------------+--------------+
| % nRBC      | 0                        | 0 - 2 per 100   | [facility]  |              |
|             |                          | WBCs            | [facility]    |              |
|             |                          |                 | MEDICAL     |              |
|             |                          |                 | CENTER -    |              |
|             |                          |                 | LABORATORY  |              |
+-------------+--------------------------+-----------------+-------------+--------------+
| Absolute    | 0.00                     | 0.00 - 0.01     | [facility]  |              |
| nRBC        |                          | K/uL            |  [facility]    |              |
|             |                          |                 | [facility]     |              |
|             |                          |                 | CENTER -    |              |
 
|             |                          |                 | LABORATORY  |              |
+-------------+--------------------------+-----------------+-------------+--------------+
 
 
 
+----------+
| Specimen |
+----------+
| Blood    |
+----------+
 
 
 
+----------------------+--------------------+--------------------+----------------+
| Performing           | Address            | City/State/Zipcode | Phone Number   |
| Organization         |                    |                    |                |
+----------------------+--------------------+--------------------+----------------+
|   [facility]     |   [address] |   [place]  |   [phone] |
| [facility]  |                    | [address]              |                |
| - LABORATORY         |                    |                    |                |
+----------------------+--------------------+--------------------+----------------+
 Troponin I (2020  6:02 AM PST)
 
+------------+--------------------------+-------------+-------------+--------------+
| Component  | Value                    | Ref Range   | Performed   | Pathologist  |
|            |                          |             | At          | Signature    |
+------------+--------------------------+-------------+-------------+--------------+
| Troponin I | 0.04Comment:             | <0.06 ng/mL | [facility]  |              |
|            | Comment:Reference        |             | [facility]    |              |
|            | Ranges: 0.00-0.06 =      |             | MEDICAL     |              |
|            | NORMAL >0.06       =     |             | CENTER -    |              |
|            | SUSPICIOUS FOR           |             | LABORATORY  |              |
|            | MYOCARDIAL DAMAGE  NOTE: |             |             |              |
|            |  Values greater than     |             |             |              |
|            | 0.78 ng/mL have been     |             |             |              |
|            | shown to be strongly     |             |             |              |
|            | associated with acute    |             |             |              |
|            | myocardial infarction.   |             |             |              |
|            | The American College of  |             |             |              |
|            | Cardiology (ACC)         |             |             |              |
|            | recommends a decision    |             |             |              |
|            | limit of 0.06 ng/mL for  |             |             |              |
|            | this assay.   Results    |             |             |              |
|            | greater than 0.06 can    |             |             |              |
|            | reflect a pre-infarct    |             |             |              |
|            | acute coronary syndrome, |             |             |              |
|            |  but can also reflect    |             |             |              |
|            | myocardial necrosis or   |             |             |              |
|            | injury that is not due   |             |             |              |
|            | to coronary artery       |             |             |              |
|            | disease.   Some of these |             |             |              |
|            |  causes are  sepsis,     |             |             |              |
|            | hypocolemia, atrial      |             |             |              |
|            | fibrillation, heart      |             |             |              |
|            | failure, pulmonary       |             |             |              |
|            | embolism, myocarditis,   |             |             |              |
|            | myocardial contusion,    |             |             |              |
|            | and renal failure.   The |             |             |              |
|            |  diagnosis of myocardial |             |             |              |
|            |  infarction should be    |             |             |              |
 
|            | based on a combination   |             |             |              |
|            | of the patient's         |             |             |              |
|            | clinical presentation    |             |             |              |
|            | and the clinical         |             |             |              |
|            | laboratory test results  |             |             |              |
|            | (especially serial       |             |             |              |
|            | troponin levels).        |             |             |              |
+------------+--------------------------+-------------+-------------+--------------+
 
 
 
+----------+
| Specimen |
+----------+
| Blood    |
+----------+
 
 
 
+----------------------+--------------------+--------------------+----------------+
| Performing           | Address            | City/State/Zipcode | Phone Number   |
| Organization         |                    |                    |                |
+----------------------+--------------------+--------------------+----------------+
|   [facility]     |   [address] |   [place]  |   [phone] |
| [facility]  |                    | [address]              |                |
| - LABORATORY         |                    |                    |                |
+----------------------+--------------------+--------------------+----------------+
 ECG 12 lead (2020  4:54 AM PST)
 
+-------------+--------------------------+-----------+------------+--------------+
| Component   | Value                    | Ref Range | Performed  | Pathologist  |
|             |                          |           | At         | Signature    |
+-------------+--------------------------+-----------+------------+--------------+
| VENTRICULAR | 68                       | BPM       | [facility]  |              |
|  RATE EKG   |                          |           |            |              |
+-------------+--------------------------+-----------+------------+--------------+
| ATRIAL RATE | 68                       | BPM       | [facility]  |              |
+-------------+--------------------------+-----------+------------+--------------+
| P-R         | 332                      | ms        | [facility]  |              |
| INTERVAL    |                          |           |            |              |
+-------------+--------------------------+-----------+------------+--------------+
| QRS         | 150                      | ms        | [facility]  |              |
| DURATION    |                          |           |            |              |
+-------------+--------------------------+-----------+------------+--------------+
| Q-T         | 508                      | ms        | [facility]  |              |
| INTERVAL    |                          |           |            |              |
+-------------+--------------------------+-----------+------------+--------------+
| Q-T         | 540                      | ms        | [facility]  |              |
| INTERVAL    |                          |           |            |              |
| (CORRECTED) |                          |           |            |              |
+-------------+--------------------------+-----------+------------+--------------+
| P WAVE AXIS | 74                       | degrees   | [facility]  |              |
+-------------+--------------------------+-----------+------------+--------------+
| QRS AXIS    | 116                      | degrees   | [facility]  |              |
+-------------+--------------------------+-----------+------------+--------------+
| T AXIS      | 21                       | degrees   | [facility]  |              |
+-------------+--------------------------+-----------+------------+--------------+
| INTERPRETAT | Atrial-sensed            |           | [facility]  |              |
| ION TEXT    | ventricular-paced rhythm |           |            |              |
|             |  with prolonged AV       |           |            |              |
 
|             | conductionAbnormal       |           |            |              |
|             | ECGWhen compared with    |           |            |              |
|             | ECG of 2020       |           |            |              |
|             | 18:22,                   |           |            |              |
|             | (Unconfirmed)Vent. rate  |           |            |              |
|             | has decreased BY   28    |           |            |              |
|             | BPMConfirmed by LIZY  |           |            |              |
|             | JAREN REEVES ([id number]) on       |           |            |              |
|             | 2020 7:08:52 AM      |           |            |              |
+-------------+--------------------------+-----------+------------+--------------+
 
 
 
+----------+
| Specimen |
+----------+
|          |
+----------+
 
 
 
+----------------------------------------------------------+--------------+
| Narrative                                                | Performed At |
+----------------------------------------------------------+--------------+
|   This result has an attachment that is not available.   |              |
+----------------------------------------------------------+--------------+
 
 
 
+--------------+---------+--------------------+--------------+
| Performing   | Address | City/State/Zipcode | Phone Number |
| Organization |         |                    |              |
+--------------+---------+--------------------+--------------+
|   [facility]  |         |                    |              |
+--------------+---------+--------------------+--------------+
 POC Glucose (2020  3:14 AM PST)
 
+-----------+---------+----------------+-------------+--------------+
| Component | Value   | Ref Range      | Performed   | Pathologist  |
|           |         |                | At          | Signature    |
+-----------+---------+----------------+-------------+--------------+
| Glucose,  | 217 (H) | 70 - 109 mg/dL | [facility]  |              |
| POC       |         |                | [facility]    |              |
|           |         |                | MEDICAL     |              |
|           |         |                | CENTER -    |              |
|           |         |                | LABORATORY  |              |
+-----------+---------+----------------+-------------+--------------+
 
 
 
+----------+
| Specimen |
+----------+
| Blood    |
+----------+
 
 
 
+----------------------+--------------------+--------------------+----------------+
| Performing           | Address            | City/State/Zipcode | Phone Number   |
 
| Organization         |                    |                    |                |
+----------------------+--------------------+--------------------+----------------+
|   [facility]     |   [address] |   [place]  |   [phone] |
| [facility]  |                    | [address]              |                |
| - LABORATORY         |                    |                    |                |
+----------------------+--------------------+--------------------+----------------+
 Troponin I (2020 11:55 PM PST)
 
+------------+--------------------------+-------------+-------------+--------------+
| Component  | Value                    | Ref Range   | Performed   | Pathologist  |
|            |                          |             | At          | Signature    |
+------------+--------------------------+-------------+-------------+--------------+
| Troponin I | 0.04Comment:             | <0.06 ng/mL | [facility]  |              |
|            | Comment:Reference        |             | [facility]    |              |
|            | Ranges: 0.00-0.06 =      |             | MEDICAL     |              |
|            | NORMAL >0.06       =     |             | CENTER -    |              |
|            | SUSPICIOUS FOR           |             | LABORATORY  |              |
|            | MYOCARDIAL DAMAGE  NOTE: |             |             |              |
|            |  Values greater than     |             |             |              |
|            | 0.78 ng/mL have been     |             |             |              |
|            | shown to be strongly     |             |             |              |
|            | associated with acute    |             |             |              |
|            | myocardial infarction.   |             |             |              |
|            | The American College of  |             |             |              |
|            | Cardiology (ACC)         |             |             |              |
|            | recommends a decision    |             |             |              |
|            | limit of 0.06 ng/mL for  |             |             |              |
|            | this assay.   Results    |             |             |              |
|            | greater than 0.06 can    |             |             |              |
|            | reflect a pre-infarct    |             |             |              |
|            | acute coronary syndrome, |             |             |              |
|            |  but can also reflect    |             |             |              |
|            | myocardial necrosis or   |             |             |              |
|            | injury that is not due   |             |             |              |
|            | to coronary artery       |             |             |              |
|            | disease.   Some of these |             |             |              |
|            |  causes are  sepsis,     |             |             |              |
|            | hypocolemia, atrial      |             |             |              |
|            | fibrillation, heart      |             |             |              |
|            | failure, pulmonary       |             |             |              |
|            | embolism, myocarditis,   |             |             |              |
|            | myocardial contusion,    |             |             |              |
|            | and renal failure.   The |             |             |              |
|            |  diagnosis of myocardial |             |             |              |
|            |  infarction should be    |             |             |              |
|            | based on a combination   |             |             |              |
|            | of the patient's         |             |             |              |
|            | clinical presentation    |             |             |              |
|            | and the clinical         |             |             |              |
|            | laboratory test results  |             |             |              |
|            | (especially serial       |             |             |              |
|            | troponin levels).        |             |             |              |
+------------+--------------------------+-------------+-------------+--------------+
 
 
 
+----------+
| Specimen |
+----------+
| Blood    |
 
+----------+
 
 
 
+----------------------+--------------------+--------------------+----------------+
| Performing           | Address            | City/State/Zipcode | Phone Number   |
| Organization         |                    |                    |                |
+----------------------+--------------------+--------------------+----------------+
|   [facility]     |   [address] |   [place]  |   [phone] |
| [facility]  |                    | [address]              |                |
| - LABORATORY         |                    |                    |                |
+----------------------+--------------------+--------------------+----------------+
 POC Glucose (2020 10:01 PM PST)
 
+-----------+---------+----------------+-------------+--------------+
| Component | Value   | Ref Range      | Performed   | Pathologist  |
|           |         |                | At          | Signature    |
+-----------+---------+----------------+-------------+--------------+
| Glucose,  | 329 (H) | 70 - 109 mg/dL | [facility]  |              |
| POC       |         |                | [facility]    |              |
|           |         |                | MEDICAL     |              |
|           |         |                | CENTER -    |              |
|           |         |                | LABORATORY  |              |
+-----------+---------+----------------+-------------+--------------+
 
 
 
+----------+
| Specimen |
+----------+
| Blood    |
+----------+
 
 
 
+----------------------+--------------------+--------------------+----------------+
| Performing           | Address            | City/State/Zipcode | Phone Number   |
| Organization         |                    |                    |                |
+----------------------+--------------------+--------------------+----------------+
|   [facility]     |   [address] |   [place], [place]  |   [phone] |
| [facility]  |                    | [address]              |                |
| - LABORATORY         |                    |                    |                |
+----------------------+--------------------+--------------------+----------------+
 Ammonia (2020  8:19 PM PST)
 
+-----------+-------+----------------+-------------+--------------+
| Component | Value | Ref Range      | Performed   | Pathologist  |
|           |       |                | At          | Signature    |
+-----------+-------+----------------+-------------+--------------+
| Ammonia   | 14    | 11 - 32 umol/L | [facility]  |              |
|           |       |                | [facility]    |              |
|           |       |                | MEDICAL     |              |
|           |       |                | CENTER -    |              |
|           |       |                | LABORATORY  |              |
+-----------+-------+----------------+-------------+--------------+
 
 
 
+----------+
| Specimen |
 
+----------+
| Blood    |
+----------+
 
 
 
+----------------------+--------------------+--------------------+----------------+
| Performing           | Address            | City/State/Zipcode | Phone Number   |
| Organization         |                    |                    |                |
+----------------------+--------------------+--------------------+----------------+
|   [facility]     |   [address] |   [place]  |   [phone] |
| [facility]  |                    | [address]              |                |
| - LABORATORY         |                    |                    |                |
+----------------------+--------------------+--------------------+----------------+
 Extra Gold Top Tube (2020  8:05 PM PST)
 
+-------------+-------+-----------+-------------+--------------+
| Component   | Value | Ref Range | Performed   | Pathologist  |
|             |       |           | At          | Signature    |
+-------------+-------+-----------+-------------+--------------+
| Extra Gold  | Done  |           | [facility]  |              |
| Top Tube    |       |           | [facility]    |              |
|             |       |           | MEDICAL     |              |
|             |       |           | CENTER -    |              |
|             |       |           | LABORATORY  |              |
+-------------+-------+-----------+-------------+--------------+
 
 
 
+----------+
| Specimen |
+----------+
| Blood    |
+----------+
 
 
 
+----------------------+--------------------+--------------------+----------------+
| Performing           | Address            | City/State/Zipcode | Phone Number   |
| Organization         |                    |                    |                |
+----------------------+--------------------+--------------------+----------------+
|   [facility]     |   [address] |   [place]  |   [phone] |
| [facility]  |                    | [address]              |                |
| - LABORATORY         |                    |                    |                |
+----------------------+--------------------+--------------------+----------------+
 Extra Lavender Top Tube (2020  8:03 PM PST)
 
+-----------+-------+-----------+-------------+--------------+
| Component | Value | Ref Range | Performed   | Pathologist  |
|           |       |           | At          | Signature    |
+-----------+-------+-----------+-------------+--------------+
| Extra     | Done  |           | [facility]  |              |
| [facility]  |       |           | [facility]    |              |
| [facility]  |       |           | MEDICAL     |              |
|           |       |           | CENTER -    |              |
|           |       |           | LABORATORY  |              |
+-----------+-------+-----------+-------------+--------------+
 
 
 
 
+----------+
| Specimen |
+----------+
| Blood    |
+----------+
 
 
 
+----------------------+--------------------+--------------------+----------------+
| Performing           | Address            | City/State/Zipcode | Phone Number   |
| Organization         |                    |                    |                |
+----------------------+--------------------+--------------------+----------------+
|   [facility]     |   [address] |   [place]  |   [phone] |
| [facility]  |                    | [address]              |                |
| - LABORATORY         |                    |                    |                |
+----------------------+--------------------+--------------------+----------------+
 Protime INR (2020  7:56 PM PST)
 
+-------------+-------------------------+--------------+-------------+--------------+
| Component   | Value                   | Ref Range    | Performed   | Pathologist  |
|             |                         |              | At          | Signature    |
+-------------+-------------------------+--------------+-------------+--------------+
| Prothrombin | 14.8 (H)                | 11.3 - 13.9  | [facility]  |              |
|  Time       |                         | seconds      | [facility]    |              |
|             |                         |              | MEDICAL     |              |
|             |                         |              | CENTER -    |              |
|             |                         |              | LABORATORY  |              |
+-------------+-------------------------+--------------+-------------+--------------+
| INR         | 1.2 (H)Comment: Usual   | 0.9 - 1.1    | [facility]  |              |
|             | Oral Anticoagulation    |              | [facility]    |              |
|             | Range:         2.0 -    |              | MEDICAL     |              |
|             | 3.0High Level Oral      |              | CENTER -    |              |
|             | Anticoagulation Range:  |              | LABORATORY  |              |
|             | 2.5 - 3.5               |              |             |              |
+-------------+-------------------------+--------------+-------------+--------------+
 
 
 
+----------+
| Specimen |
+----------+
| Blood    |
+----------+
 
 
 
+----------------------+--------------------+--------------------+----------------+
| Performing           | Address            | City/State/Zipcode | Phone Number   |
| Organization         |                    |                    |                |
+----------------------+--------------------+--------------------+----------------+
|   [facility]     |   [address] |   [place]  |   [phone] |
| [facility]  |                    | [address]              |                |
| - LABORATORY         |                    |                    |                |
+----------------------+--------------------+--------------------+----------------+
 Lactate Dehydrogenase (2020  7:56 PM PST)
 
+-----------+--------------------------+---------------+-------------+--------------+
| Component | Value                    | Ref Range     | Performed   | Pathologist  |
|           |                          |               | At          | Signature    |
+-----------+--------------------------+---------------+-------------+--------------+
 
| LDH TOTAL | 309 (H)Comment: New      | 120 - 246 U/L | [facility]  |              |
|           | method in use as of      |               | [facility]    |              |
|           | 2019. Check |               | MEDICAL     |              |
|           |  reference range for     |               | CENTER -    |              |
|           | changes.Some analytes    |               | LABORATORY  |              |
|           | show significant         |               |             |              |
|           | variation from the       |               |             |              |
|           | previous method.It may   |               |             |              |
|           | be necessary to set a    |               |             |              |
|           | new baseline for this    |               |             |              |
|           | analyte.                 |               |             |              |
+-----------+--------------------------+---------------+-------------+--------------+
 
 
 
+----------+
| Specimen |
+----------+
| Blood    |
+----------+
 
 
 
+----------------------+--------------------+--------------------+----------------+
| Performing           | Address            | City/State/Zipcode | Phone Number   |
| Organization         |                    |                    |                |
+----------------------+--------------------+--------------------+----------------+
|   [facility]     |   [address] |   [place]  |   [phone] |
| [facility]  |                    | [address]              |                |
| - LABORATORY         |                    |                    |                |
+----------------------+--------------------+--------------------+----------------+
 Hemoglobin A1C (2020  6:38 PM PST)
 
+-------------+----------+-------------+-------------+--------------+
| Component   | Value    | Ref Range   | Performed   | Pathologist  |
|             |          |             | At          | Signature    |
+-------------+----------+-------------+-------------+--------------+
| Hemoglobin  | 10.5 (H) | 4.3 - 6.0 % | [facility]  |              |
| A1c         |          |             | [facility]    |              |
|             |          |             | MEDICAL     |              |
|             |          |             | CENTER -    |              |
|             |          |             | LABORATORY  |              |
+-------------+----------+-------------+-------------+--------------+
| Estimated   | 255      | mg/dL       | [facility]  |              |
| Average     |          |             | [facility]    |              |
| Glucose     |          |             | MEDICAL     |              |
|             |          |             | CENTER -    |              |
|             |          |             | LABORATORY  |              |
+-------------+----------+-------------+-------------+--------------+
 
 
 
+----------+
| Specimen |
+----------+
| Blood    |
+----------+
 
 
 
 
+----------------------+--------------------+--------------------+----------------+
| Performing           | Address            | City/State/Zipcode | Phone Number   |
| Organization         |                    |                    |                |
+----------------------+--------------------+--------------------+----------------+
|   [facility]     |   [address] |   [place]  |   [phone] |
| [facility]  |                    | [address]              |                |
| - LABORATORY         |                    |                    |                |
+----------------------+--------------------+--------------------+----------------+
 ECG 12 lead (2020  6:22 PM PST)
 
+-------------+--------------------------+-----------+------------+--------------+
| Component   | Value                    | Ref Range | Performed  | Pathologist  |
|             |                          |           | At         | Signature    |
+-------------+--------------------------+-----------+------------+--------------+
| VENTRICULAR | 96                       | BPM       | [facility]  |              |
|  RATE EKG   |                          |           |            |              |
+-------------+--------------------------+-----------+------------+--------------+
| QRS         | 148                      | ms        | [facility]  |              |
| DURATION    |                          |           |            |              |
+-------------+--------------------------+-----------+------------+--------------+
| Q-T         | 410                      | ms        | [facility]  |              |
| INTERVAL    |                          |           |            |              |
+-------------+--------------------------+-----------+------------+--------------+
| Q-T         | 517                      | ms        | [facility]  |              |
| INTERVAL    |                          |           |            |              |
| (CORRECTED) |                          |           |            |              |
+-------------+--------------------------+-----------+------------+--------------+
| QRS AXIS    | 94                       | degrees   | [facility]  |              |
+-------------+--------------------------+-----------+------------+--------------+
| T AXIS      | 35                       | degrees   | [facility]  |              |
+-------------+--------------------------+-----------+------------+--------------+
| INTERPRETAT | Ventricular-paced        |           | [facility]  |              |
| ION TEXT    | rhythmAbnormal ECGNo     |           |            |              |
|             | previous ECGs            |           |            |              |
|             | availableConfirmed by    |           |            |              |
|             | JAREN MEDEL MD ([id number])  |           |            |              |
|             | on 2020 7:08:10 AM   |           |            |              |
+-------------+--------------------------+-----------+------------+--------------+
 
 
 
+----------+
| Specimen |
+----------+
|          |
+----------+
 
 
 
+----------------------------------------------------------+--------------+
| Narrative                                                | Performed At |
+----------------------------------------------------------+--------------+
|   This result has an attachment that is not available.   |              |
+----------------------------------------------------------+--------------+
 
 
 
+--------------+---------+--------------------+--------------+
| Performing   | Address | City/State/Zipcode | Phone Number |
| Organization |         |                    |              |
 
+--------------+---------+--------------------+--------------+
|   [facility]  |         |                    |              |
+--------------+---------+--------------------+--------------+
 Lipid Panel (2020  6:15 PM PST)
 
+-------------+--------+---------------+-------------+--------------+
| Component   | Value  | Ref Range     | Performed   | Pathologist  |
|             |        |               | At          | Signature    |
+-------------+--------+---------------+-------------+--------------+
| Triglycerid | 115    | <=150 mg/dL   | [facility]  |              |
| es          |        |               | [facility]    |              |
|             |        |               | MEDICAL     |              |
|             |        |               | CENTER -    |              |
|             |        |               | LABORATORY  |              |
+-------------+--------+---------------+-------------+--------------+
| Cholesterol | 104    | <=200 mg/dL   | [facility]  |              |
|             |        |               | [facility]    |              |
|             |        |               | MEDICAL     |              |
|             |        |               | CENTER -    |              |
|             |        |               | LABORATORY  |              |
+-------------+--------+---------------+-------------+--------------+
| HDL         | 22 (L) | 40 - 60 mg/dL | [facility]  |              |
|             |        |               | [facility]    |              |
|             |        |               | MEDICAL     |              |
|             |        |               | CENTER -    |              |
|             |        |               | LABORATORY  |              |
+-------------+--------+---------------+-------------+--------------+
| Chol/HDL    | 4.7    |               | [facility]  |              |
| Ratio       |        |               | [facility]    |              |
|             |        |               | MEDICAL     |              |
|             |        |               | CENTER -    |              |
|             |        |               | LABORATORY  |              |
+-------------+--------+---------------+-------------+--------------+
| LDL,        | 59     | <=130 mg/dL   | [facility]  |              |
| Calculated  |        |               | [facility]    |              |
|             |        |               | MEDICAL     |              |
|             |        |               | CENTER -    |              |
|             |        |               | LABORATORY  |              |
+-------------+--------+---------------+-------------+--------------+
 
 
 
+----------+
| Specimen |
+----------+
| Blood    |
+----------+
 
 
 
+----------------------+--------------------+--------------------+----------------+
| Performing           | Address            | City/State/Zipcode | Phone Number   |
| Organization         |                    |                    |                |
+----------------------+--------------------+--------------------+----------------+
|   [facility]     |   [address] |   [place]  |   [phone] |
| [facility]  |                    | [address]              |                |
| - LABORATORY         |                    |                    |                |
+----------------------+--------------------+--------------------+----------------+
 TSH (2020  6:15 PM PST)
 
 
+-----------+-------+--------------+-------------+--------------+
| Component | Value | Ref Range    | Performed   | Pathologist  |
|           |       |              | At          | Signature    |
+-----------+-------+--------------+-------------+--------------+
| TSH       | 4.43  | 0.55 - 4.78  | [facility]  |              |
|           |       | uIU/mL       | [facility]    |              |
|           |       |              | MEDICAL     |              |
|           |       |              | CENTER -    |              |
|           |       |              | LABORATORY  |              |
+-----------+-------+--------------+-------------+--------------+
 
 
 
+----------+
| Specimen |
+----------+
| Blood    |
+----------+
 
 
 
+----------------------+--------------------+--------------------+----------------+
| Performing           | Address            | City/State/Zipcode | Phone Number   |
| Organization         |                    |                    |                |
+----------------------+--------------------+--------------------+----------------+
|   [facility]     |   [address] |   [place]  |   [phone] |
| [facility]  |                    | [address]              |                |
| - LABORATORY         |                    |                    |                |
+----------------------+--------------------+--------------------+----------------+
 Troponin I (2020  6:15 PM PST)
 
+------------+--------------------------+-------------+-------------+--------------+
| Component  | Value                    | Ref Range   | Performed   | Pathologist  |
|            |                          |             | At          | Signature    |
+------------+--------------------------+-------------+-------------+--------------+
| Troponin I | 0.03Comment:             | <0.06 ng/mL | [facility]  |              |
|            | Comment:Reference        |             | [facility]    |              |
|            | Ranges: 0.00-0.06 =      |             | MEDICAL     |              |
|            | NORMAL >0.06       =     |             | CENTER -    |              |
|            | SUSPICIOUS FOR           |             | LABORATORY  |              |
|            | MYOCARDIAL DAMAGE  NOTE: |             |             |              |
|            |  Values greater than     |             |             |              |
|            | 0.78 ng/mL have been     |             |             |              |
|            | shown to be strongly     |             |             |              |
|            | associated with acute    |             |             |              |
|            | myocardial infarction.   |             |             |              |
|            | The American College of  |             |             |              |
|            | Cardiology (ACC)         |             |             |              |
|            | recommends a decision    |             |             |              |
|            | limit of 0.06 ng/mL for  |             |             |              |
|            | this assay.   Results    |             |             |              |
|            | greater than 0.06 can    |             |             |              |
|            | reflect a pre-infarct    |             |             |              |
|            | acute coronary syndrome, |             |             |              |
|            |  but can also reflect    |             |             |              |
|            | myocardial necrosis or   |             |             |              |
|            | injury that is not due   |             |             |              |
|            | to coronary artery       |             |             |              |
|            | disease.   Some of these |             |             |              |
|            |  causes are  sepsis,     |             |             |              |
 
|            | hypocolemia, atrial      |             |             |              |
|            | fibrillation, heart      |             |             |              |
|            | failure, pulmonary       |             |             |              |
|            | embolism, myocarditis,   |             |             |              |
|            | myocardial contusion,    |             |             |              |
|            | and renal failure.   The |             |             |              |
|            |  diagnosis of myocardial |             |             |              |
|            |  infarction should be    |             |             |              |
|            | based on a combination   |             |             |              |
|            | of the patient's         |             |             |              |
|            | clinical presentation    |             |             |              |
|            | and the clinical         |             |             |              |
|            | laboratory test results  |             |             |              |
|            | (especially serial       |             |             |              |
|            | troponin levels).        |             |             |              |
+------------+--------------------------+-------------+-------------+--------------+
 
 
 
+----------+
| Specimen |
+----------+
| Blood    |
+----------+
 
 
 
+----------------------+--------------------+--------------------+----------------+
| Performing           | Address            | City/State/Zipcode | Phone Number   |
| Organization         |                    |                    |                |
+----------------------+--------------------+--------------------+----------------+
|   [facility]     |   [address] |   [place]  |   [phone] |
| [facility]  |                    | [address]              |                |
| - LABORATORY         |                    |                    |                |
+----------------------+--------------------+--------------------+----------------+
 XR Chest 1 Vw (2020  3:05 PM PST)
 
+----------+
| Specimen |
+----------+
|          |
+----------+
 
 
 
+-----------------------------------------+---------------+
| Narrative                               | Performed At  |
+-----------------------------------------+---------------+
|   External films for comparison only    |   [facility] |
|                                         |               |
|  No results will be in the chart.       |               |
+-----------------------------------------+---------------+
 
 
 
+---------------+---------+--------------------+--------------+
| Performing    | Address | City/State/Zipcode | Phone Number |
| Organization  |         |                    |              |
+---------------+---------+--------------------+--------------+
|   [facility] |         |                    |              |
 
+---------------+---------+--------------------+--------------+
 Medical Cytology (2020 12:00 AM PST)
 
+----------------------+
| Specimen             |
+----------------------+
| Body Fluid - Ascitic |
|  fluid sample        |
| (specimen)           |
+----------------------+
 
 
 
+------------------------------------------------------------------------+-----------------+
| Narrative                                                              | Performed At    |
+------------------------------------------------------------------------+-----------------+
|   ORDERING PHYSICIAN:  Trace Reyes MD     PATIENT NAME:            |   [facility]  |
| ZEUS HUANG  GENDER:   HATTIE       :   1950         | [facility]          |
| SPECIMEN(S): A ASCITES FLUID     GROSS DESCRIPTION:   APPROXIMATELY    |                 |
| 1000 ML OF CLOUDY, YELLOW FLUID (FRESH)     CLINICAL HISTORY:   NO     |                 |
| CLINICAL DATA PROVIDED     LABORATORY PREPARATIONS:   1 MONOLAYER, 1   |                 |
| CELL BLOCK     CYTOLOGIC INTERPRETATION:  Ascites:  Negative for       |                 |
| malignant cells.     DESCRIPTION:  The preparations contain            |                 |
| mesothelial cells, rare inflammatory cells, and acellular              |                 |
| proteinaceous material.   Atypical cytologic findings are not          |                 |
| encountered.     SPECIMEN ADEQUACY:  Satisfactory for Evaluation       |                 |
| PERFORMING LABORATORY:  Technical preparation was performed by [facility]  |                 |
| [facility] [id number] [place] [address] and      |                 |
| [facility], [address] [place], [place]    |                 |
| [id number].  Professional interpretation was performed by [facility]            |                 |
| [facility] - [facility] - [address] [facility] |                 |
|  [place] [address] (Medical Director: Yemi Carty MD.;          |                 |
| SEPIDEH#:[id number]     Diagnostician:   Meena SWANSON ([facility])     |                 |
| Cytotechnologist  Diagnostician:   Yemi Carty MD            |                 |
| Pathologist  Electronically Signed 02/10/2020                          |                 |
+------------------------------------------------------------------------+-----------------+
 
 
 
+-----------------+---------+--------------------+--------------+
| Performing      | Address | City/State/Zipcode | Phone Number |
| Organization    |         |                    |              |
+-----------------+---------+--------------------+--------------+
|   [facility]  |         |                    |              |
| [facility]          |         |                    |              |
+-----------------+---------+--------------------+--------------+
 ECG - EXTERNAL SCAN (2020 12:00 AM PST)
 
+------------------------------------------------------------------------+--------------+
| Narrative                                                              | Performed At |
+------------------------------------------------------------------------+--------------+
|   This result has an attachment that is not available.  Ordered by an  |              |
| unspecified provider.                                                  |              |
+------------------------------------------------------------------------+--------------+
 documented in this encounter
 
 Visit Diagnoses
 
 
+---------------------------------------------------------------------------------------+
 
| Diagnosis                                                                             |
+---------------------------------------------------------------------------------------+
|   Physical deconditioning - Primary  Debility, unspecified                            |
+---------------------------------------------------------------------------------------+
|   ACS (acute coronary syndrome) (HCC)  Intermediate coronary syndrome                 |
+---------------------------------------------------------------------------------------+
|   Cirrhosis of liver with ascites, unspecified hepatic cirrhosis type (HCC)           |
+---------------------------------------------------------------------------------------+
|   Diabetes mellitus type 2, insulin dependent (HCC)  Type II or unspecified type      |
| diabetes mellitus without mention of complication, not stated as uncontrolled         |
+---------------------------------------------------------------------------------------+
|   Elevated troponin  Other abnormal blood chemistry                                   |
+---------------------------------------------------------------------------------------+
|   Essential hypertension  Unspecified essential hypertension                          |
+---------------------------------------------------------------------------------------+
|   Pancytopenia (HCC)  Other pancytopenia                                              |
+---------------------------------------------------------------------------------------+
|   Insulin dependent diabetes mellitus  Type II or unspecified type diabetes mellitus  |
| without mention of complication, not stated as uncontrolled                           |
+---------------------------------------------------------------------------------------+
|   Hyperlipidemia, unspecified hyperlipidemia type                                     |
+---------------------------------------------------------------------------------------+
|   Diarrhea, unspecified type                                                          |
+---------------------------------------------------------------------------------------+
 documented in this encounter
 
 Admitting Diagnoses
 
 
+-----------------------------------------------------------------------+
| Diagnosis                                                             |
+-----------------------------------------------------------------------+
|   ACS (acute coronary syndrome) (HCC)  Intermediate coronary syndrome |
+-----------------------------------------------------------------------+
 documented in this encounter
 
 Administered Medications
 
 
+-----------------------------------+--------+----------+--------+------+------+
| Medication Order                  | MAR    | Action   | Dose   | Rate | Site |
|                                   | Action | Date     |        |      |      |
+-----------------------------------+--------+----------+--------+------+------+
|   acetaminophen (TYLENOL) tablet  | Given  | 20 | 650 mg |      |      |
| 650 mg  650 mg, Oral, EVERY 4     |        | 20 11:46 |        |      |      |
| HOURS PRN, Pain, or fever >= 38.6 |        |  PM PST  |        |      |      |
|  C (101.5 F), Starting Wed 20 |        |          |        |      |      |
|  at 1807                          |        |          |        |      |      |
+-----------------------------------+--------+----------+--------+------+------+
 
 
 
+-------+----------+--------+---+---+
| Given | 20 | 650 mg |   |   |
|       | 20  9:44 |        |   |   |
|       |  PM PST  |        |   |   |
+-------+----------+--------+---+---+
 
 
 
 
+-----------------------------------+---+
|                                   |   |
+-----------------------------------+---+
|   aluminum & magnesium            |   |
| hydroxide-simethicone (MAALOX     |   |
| PLUS REGULAR STRENGTH) 200-200-20 |   |
|  mg/5 mL suspension 30 mL  30 mL, |   |
|  Oral, EVERY 4 HOURS PRN,         |   |
| Indigestion, Starting Wed 20  |   |
| at 1807, Shake well.,             |   |
+-----------------------------------+---+
|                                   |   |
+-----------------------------------+---+
 
 
 
+-----------------------------------+-------+----------+-------+---+---+
|   aminophylline injection 75 mg   | Given | 20 | 75 mg |   |   |
| 75 mg, Intravenous, ONCE PRN,     |       | 20  8:58 |       |   |   |
| protocol, Starting Thu 20 at  |       |  AM PST  |       |   |   |
| 0857, For 1 dose, Nuclear         |       |          |       |   |   |
| Medicine                          |       |          |       |   |   |
+-----------------------------------+-------+----------+-------+---+---+
 
 
 
+---+---+
|   |   |
+---+---+
 
 
 
+-----------------------------------+-------+----------+-------+---+---+
|   aminophylline injection 75 mg   | Given | 20 | 75 mg |   |   |
| 75 mg, Intravenous, ONCE PRN,     |       | 20  9:27 |       |   |   |
| protocol, Starting Thu 20 at  |       |  AM PST  |       |   |   |
| 0927, For 1 dose, Nuclear         |       |          |       |   |   |
| Medicine                          |       |          |       |   |   |
+-----------------------------------+-------+----------+-------+---+---+
 
 
 
+---+---+
|   |   |
+---+---+
 
 
 
+-----------------------------------+-------+----------+-------+---+---+
|   aspirin chewable tablet 81 mg   | Given | 02/10/20 | 81 mg |   |   |
| 81 mg, Oral, DAILY, First dose on |       | 20  8:03 |       |   |   |
|  Thu 20 at 0900               |       |  AM PST  |       |   |   |
+-----------------------------------+-------+----------+-------+---+---+
 
 
 
+-------+----------+-------+---+---+
| Given | 20 | 81 mg |   |   |
|       | 20  9:59 |       |   |   |
|       |  AM PST  |       |   |   |
 
+-------+----------+-------+---+---+
| Given | 20 | 81 mg |   |   |
|       | 20  9:20 |       |   |   |
|       |  AM PST  |       |   |   |
+-------+----------+-------+---+---+
 
 
 
+---+---+
|   |   |
+---+---+
 
 
 
+----------------------------------+-------+----------+-------+---+---+
|   atorvaSTATin (LIPITOR) tablet  | Given | 20 | 80 mg |   |   |
| 80 mg  80 mg, Oral, NIGHTLY,     |       | 20  8:19 |       |   |   |
| First dose on 20 at 2100 |       |  PM PST  |       |   |   |
+----------------------------------+-------+----------+-------+---+---+
 
 
 
+-------+----------+-------+---+---+
| Given | 20 | 80 mg |   |   |
|       | 20  8:51 |       |   |   |
|       |  PM PST  |       |   |   |
+-------+----------+-------+---+---+
| Given | 20 | 80 mg |   |   |
|       | 20  9:22 |       |   |   |
|       |  PM PST  |       |   |   |
+-------+----------+-------+---+---+
 
 
 
+-----------------------------------+---+
|                                   |   |
+-----------------------------------+---+
|   dextrose 10% (D10W) infusion    |   |
| at 50 mL/hr, Intravenous,         |   |
| CONTINUOUS PRN, hypoglycemia,     |   |
| Starting Wed 20 at 1934,      |   |
| Start infusion if unable to       |   |
| maintain blood glucose greater    |   |
| than 70 mg/dL after two rounds of |   |
|  hypoglycemia treatment. Recheck  |   |
| blood glucose 30 minutes after    |   |
| starting D10W then at least       |   |
| hourly and PRN until it is        |   |
| discontinued. Call provider to    |   |
| discuss parameters for D10W       |   |
| discontinuation.,                 |   |
+-----------------------------------+---+
|                                   |   |
+-----------------------------------+---+
|   dextrose 50% injection 12.5-25  |   |
| g  12.5-25 g, Intravenous, PRN,   |   |
| Low Blood Sugar, Starting Wed     |   |
| 20 at 1934, For blood glucose |   |
|  50-69 mg/dl - give 12.5 g For    |   |
| blood glucose less than 50 mg/dl  |   |
 
| - give 25 g,                      |   |
+-----------------------------------+---+
|                                   |   |
+-----------------------------------+---+
 
 
 
+-----------------------------------+-------+----------+-------+---+---+
|   dicyclomine (BENTYL) capsule 20 | Given | 02/10/20 | 20 mg |   |   |
|  mg  20 mg, Oral, 4 TIMES DAILY   |       | 20 12:21 |       |   |   |
| PRN, GI Upset, Starting Sun       |       |  PM PST  |       |   |   |
| 20 at 0921                    |       |          |       |   |   |
+-----------------------------------+-------+----------+-------+---+---+
 
 
 
+---+---+
|   |   |
+---+---+
 
 
 
+-----------------------------------+-------+----------+--------+---+---+
|   diphenoxylate-atropine          | Given | 20 | 10 mLs |   |   |
| (LOMOTIL) 2.5-0.025 mg/5 mL       |       | 20 11:34 |        |   |   |
| liquid 10 mL  10 mL, Oral, 4      |       |  AM PST  |        |   |   |
| TIMES DAILY PRN, Diarrhea,        |       |          |        |   |   |
| Starting Thu 20 at 1738,      |       |          |        |   |   |
| Please message or call pharmacy   |       |          |        |   |   |
| when dose is needed due to short  |       |          |        |   |   |
| stablity dating. Thank you.       |       |          |        |   |   |
| maximum: diphenoxylate 20 mg/day, |       |          |        |   |   |
|                                   |       |          |        |   |   |
+-----------------------------------+-------+----------+--------+---+---+
 
 
 
+-------+----------+--------+---+---+
| Given | 20 | 10 mLs |   |   |
|       | 20  8:33 |        |   |   |
|       |  PM PST  |        |   |   |
+-------+----------+--------+---+---+
 
 
 
+---+---+
|   |   |
+---+---+
 
 
 
+-----------------------------------+-------+----------+----------+--------+---+
|   dipyridamole (PERSANTINE) 60mg  | Given | 20 | 11.6014  | 464.1  |   |
| in 40 mL NS syringe  0.142        |       | 20  9:15 | mg/min   | mL/hr  |   |
| mg/kg/min                         |       |  AM PST  |          |        |   |
|  81.7 kg (464.056 mL/hr, rounded  |       |          |          |        |   |
| to 464.1 mL/hr), Intravenous,     |       |          |          |        |   |
| Administer over 4 Minutes, ONCE,  |       |          |          |        |   |
| Thu 20 at 0915, For 1 dose,   |       |          |          |        |   |
| Nuclear Medicine                  |       |          |          |        |   |
 
+-----------------------------------+-------+----------+----------+--------+---+
 
 
 
+-----------------------------------+---+
|                                   |   |
+-----------------------------------+---+
|   docusate sodium (COLACE)        |   |
| capsule 100 mg  100 mg, Oral, 2   |   |
| TIMES DAILY PRN, Constipation,    |   |
| Starting Wed 20 at 1807, 1st  |   |
| line agent for constipation       |   |
| relief.,                          |   |
+-----------------------------------+---+
|                                   |   |
+-----------------------------------+---+
 
 
 
+----------------------------------+-------+----------+-------+---+---+
|   DULoxetine (CYMBALTA) DR       | Given | 02/10/20 | 30 mg |   |   |
| capsule 30 mg  30 mg, Oral,      |       | 20  8:04 |       |   |   |
| DAILY, First dose on 20  |       |  AM PST  |       |   |   |
| at 0900, Do not open capsule.,   |       |          |       |   |   |
+----------------------------------+-------+----------+-------+---+---+
 
 
 
+-------+----------+-------+---+---+
| Given | 20 | 30 mg |   |   |
|       | 20  9:59 |       |   |   |
|       |  AM PST  |       |   |   |
+-------+----------+-------+---+---+
| Given | 20 | 30 mg |   |   |
|       | 20  9:22 |       |   |   |
|       |  AM PST  |       |   |   |
+-------+----------+-------+---+---+
 
 
 
+---+---+
|   |   |
+---+---+
 
 
 
+----------------------------------+-------+----------+------+---+---+
|   folic acid tablet 1 mg  1 mg,  | Given | 02/10/20 | 1 mg |   |   |
| Oral, DAILY, First dose on Wed   |       | 20  8:04 |      |   |   |
| 20 at 2045                   |       |  AM PST  |      |   |   |
+----------------------------------+-------+----------+------+---+---+
 
 
 
+-------+----------+------+---+---+
| Given | 20 | 1 mg |   |   |
|       | 20  9:57 |      |   |   |
|       |  AM PST  |      |   |   |
+-------+----------+------+---+---+
| Given | 20 | 1 mg |   |   |
 
|       | 20  9:22 |      |   |   |
|       |  AM PST  |      |   |   |
+-------+----------+------+---+---+
 
 
 
+---+---+
|   |   |
+---+---+
 
 
 
+-----------------------------------+-------+----------+-------+---+---+
|   furosemide (LASIX) tablet 10 mg | Given | 20 | 10 mg |   |   |
|   10 mg, Oral, DAILY, First dose  |       | 20  9:54 |       |   |   |
| on Wed 20 at 2045             |       |  AM PST  |       |   |   |
+-----------------------------------+-------+----------+-------+---+---+
 
 
 
+-------+----------+-------+---+---+
| Given | 20 | 10 mg |   |   |
|       | 20  9:37 |       |   |   |
|       |  PM PST  |       |   |   |
+-------+----------+-------+---+---+
 
 
 
+---+---+
|   |   |
+---+---+
 
 
 
+-----------------------------------+-------+----------+-------+---+---+
|   furosemide (LASIX) tablet 30 mg | Given | 20 | 30 mg |   |   |
|   30 mg, Oral, ONCE, Thu 20   |       | 20  5:34 |       |   |   |
| at 1700, For 1 dose               |       |  PM PST  |       |   |   |
+-----------------------------------+-------+----------+-------+---+---+
 
 
 
+---+---+
|   |   |
+---+---+
 
 
 
+-----------------------------------+-------+----------+-------+---+---+
|   furosemide (LASIX) tablet 40 mg | Given | 02/10/20 | 40 mg |   |   |
|   40 mg, Oral, DAILY, First dose  |       | 20  8:05 |       |   |   |
| on Fri 20 at 0900             |       |  AM PST  |       |   |   |
+-----------------------------------+-------+----------+-------+---+---+
 
 
 
+-------+----------+-------+---+---+
| Given | 20 | 40 mg |   |   |
|       | 20  9:57 |       |   |   |
|       |  AM PST  |       |   |   |
 
+-------+----------+-------+---+---+
| Given | 20 | 40 mg |   |   |
|       | 20  9:20 |       |   |   |
|       |  AM PST  |       |   |   |
+-------+----------+-------+---+---+
 
 
 
+---+---+
|   |   |
+---+---+
 
 
 
+---------------------------------+-------+----------+--------+---+----------+
|   heparin 5,000 units/mL        | Given | 20 | 5,000  |   | Abdomen- |
| injection 5,000 Units  5,000    |       | 20 10:01 | Units  |   | RLQ      |
| Units, Subcutaneous, EVERY 12   |       |  AM PST  |        |   |          |
| HOURS (2 times per day), First  |       |          |        |   |          |
| dose on 20 at 2100      |       |          |        |   |          |
+---------------------------------+-------+----------+--------+---+----------+
 
 
 
+---+---+
|   |   |
+---+---+
 
 
 
+-----------------------------------+-------+----------+----------+---+----------+
|   insulin glargine (LANTUS        | Given | 20 | 15 Units |   | Abdomen- |
| SOLOSTAR) injection (pen) 15      |       | 20  8:27 |          |   | LLQ      |
| Units  15 Units, Subcutaneous,    |       |  PM PST  |          |   |          |
| NIGHTLY, First dose on 20 |       |          |          |   |          |
|  at 2100, If NPO: Decrease dose,  |       |          |          |   |          |
| by: 50%                           |       |          |          |   |          |
+-----------------------------------+-------+----------+----------+---+----------+
 
 
 
+-------+----------+----------+---+----------+
| Given | 20 | 15 Units |   | Abdomen- |
|       | 20  8:53 |          |   | RUQ      |
|       |  PM PST  |          |   |          |
+-------+----------+----------+---+----------+
| Given | 20 | 15 Units |   | [facility] |
|       | 20  8:38 |          |   | t Upper  |
|       |  PM PST  |          |   |          |
+-------+----------+----------+---+----------+
 
 
 
+---+---+
|   |   |
+---+---+
 
 
 
+-----------------------------------+-------+----------+---------+---+----------+
 
|   insulin lispro (humaLOG         | Given | 02/10/20 | 1 Units |   | [facility] |
| KWIKPEN) injection (pen) 0-6      |       | 20 12:16 |         |   | t Upper  |
| Units  0-6 Units, Subcutaneous, 4 |       |  PM PST  |         |   |          |
|  TIMES DAILY WITH MEALS &         |       |          |         |   |          |
| NIGHTLY, First dose on 20 |       |          |         |   |          |
|  at 2100, CORRECTION SCALE:       |       |          |         |   |          |
| Blood Glucose (BG) < 150:         |       |          |         |   |          |
|     None   -200: DAY: 1     |       |          |         |   |          |
| units.  NIGHT: 0 units   BG       |       |          |         |   |          |
| 201-250: DAY: 2 units.  NIGHT: 1  |       |          |         |   |          |
| units   -300: DAY: 3 units. |       |          |         |   |          |
|   NIGHT: 2 units   -350:    |       |          |         |   |          |
| DAY: 4 units.  NIGHT: 3 units     |       |          |         |   |          |
| -400: DAY: 5 units.  NIGHT: |       |          |         |   |          |
|  4 units       BG > 400    : DAY: |       |          |         |   |          |
|  6 units.  NIGHT: 5 units         |       |          |         |   |          |
|                   AND CALL        |       |          |         |   |          |
| PROVIDER  Use DAY DOSE for doses  |       |          |         |   |          |
| scheduled:      AC, NPO, Daytime  |       |          |         |   |          |
| 1086-8507 Use NIGHT DOSE for      |       |          |         |   |          |
| doses scheduled:      HS, 3AM,    |       |          |         |   |          |
| Nighttime 6688-1934  If the BG is |       |          |         |   |          |
|  not checked before the patient   |       |          |         |   |          |
| starts eating, do not give        |       |          |         |   |          |
| correction insulin. If HS insulin |       |          |         |   |          |
|  given, check blood glucose at    |       |          |         |   |          |
| 3AM.,                             |       |          |         |   |          |
+-----------------------------------+-------+----------+---------+---+----------+
 
 
 
+-------+----------+---------+---+----------+
| Given | 02/10/20 | 1 Units |   | [facility] |
|       | 20  8:05 |         |   | t Upper  |
|       |  AM PST  |         |   |          |
+-------+----------+---------+---+----------+
| Given | 20 | 1 Units |   | [facility] |
|       | 20  5:07 |         |   | t Upper  |
|       |  PM PST  |         |   |          |
+-------+----------+---------+---+----------+
 
 
 
+---+---+
|   |   |
+---+---+
 
 
 
+-----------------------------------+-------+----------+---+---+---+
|   lidocaine (XYLOCAINE) 2% jelly  | Given | 20 |   |   |   |
| (uro-jet)  Urethral, ONCE, Wed    |       | 20 11:09 |   |   |   |
| 20 at 2330, For 1 dose        |       |  PM PST  |   |   |   |
+-----------------------------------+-------+----------+---+---+---+
 
 
 
+---+---+
|   |   |
+---+---+
 
 
 
 
+-----------------------------------+-------+----------+------+---+---+
|   lisinopril (PRINIVIL, ZESTRIL)  | Given | 02/10/20 | 5 mg |   |   |
| tablet 5 mg  5 mg, Oral, DAILY,   |       | 20  8:04 |      |   |   |
| First dose on 20 at 0900  |       |  AM PST  |      |   |   |
+-----------------------------------+-------+----------+------+---+---+
 
 
 
+-------+----------+------+---+---+
| Given | 20 | 5 mg |   |   |
|       | 20 10:00 |      |   |   |
|       |  AM PST  |      |   |   |
+-------+----------+------+---+---+
| Given | 20 | 5 mg |   |   |
|       | 20  9:22 |      |   |   |
|       |  AM PST  |      |   |   |
+-------+----------+------+---+---+
 
 
 
+---+---+
|   |   |
+---+---+
 
 
 
+-----------------------------------+-------+----------+------+---+---+
|   loperamide (IMODIUM) capsule 2  | Given | 20 | 2 mg |   |   |
| mg  2 mg, Oral, EVERY 3 HOURS     |       | 20  6:34 |      |   |   |
| PRN, Diarrhea, Starting Thu       |       |  AM PST  |      |   |   |
| 20 at 1738, Maximum 16        |       |          |      |   |   |
| mg/day,                           |       |          |      |   |   |
+-----------------------------------+-------+----------+------+---+---+
 
 
 
+-------+----------+------+---+---+
| Given | 20 | 2 mg |   |   |
|       | 20  5:46 |      |   |   |
|       |  PM PST  |      |   |   |
+-------+----------+------+---+---+
 
 
 
+---+---+
|   |   |
+---+---+
 
 
 
+----------------------------------+---------+----------+-----+----------+---+
|   magnesium sulfate 4 g/100 mL   | New Bag | 20 | 4 g | 25 mL/hr |   |
| IVPB 4 g  4 g, Intravenous,      |         | 20  9:41 |     |          |   |
| Administer over 240 Minutes,     |         |  PM PST  |     |          |   |
| ONCE, Wed 20 at 2030, For 1  |         |          |     |          |   |
| dose, Maximum recommended        |         |          |     |          |   |
| infusion rate = 1 gram/hour.,    |         |          |     |          |   |
 
+----------------------------------+---------+----------+-----+----------+---+
 
 
 
+---+---+
|   |   |
+---+---+
 
 
 
+-----------------------------------+-------+----------+------+---+---+
|   ondansetron (ZOFRAN) injection  | Given | 20 | 4 mg |   |   |
| 4 mg  4 mg, Intravenous, EVERY 6  |       | 20  8:32 |      |   |   |
| HOURS PRN, Nausea, Vomiting,      |       |  AM PST  |      |   |   |
| Starting Wed 20 at 1807,      |       |          |      |   |   |
| First line agent,                 |       |          |      |   |   |
+-----------------------------------+-------+----------+------+---+---+
 
 
 
+---+---+
|   |   |
+---+---+
 
 
 
+-----------------------------------+-------+----------+-------+---+---+
|   pantoprazole (PROTONIX) DR      | Given | 02/10/20 | 40 mg |   |   |
| tablet 40 mg  40 mg, Oral, DAILY  |       | 20  6:03 |       |   |   |
| BEFORE BREAKFAST, First dose on   |       |  AM PST  |       |   |   |
| Thu 20 at 0730, Do not cut or |       |          |       |   |   |
|  crush., Indication: GERD         |       |          |       |   |   |
+-----------------------------------+-------+----------+-------+---+---+
 
 
 
+-------+----------+-------+---+---+
| Given | 20 | 40 mg |   |   |
|       | 20  6:49 |       |   |   |
|       |  AM PST  |       |   |   |
+-------+----------+-------+---+---+
| Given | 20 | 40 mg |   |   |
|       | 20  6:31 |       |   |   |
|       |  AM PST  |       |   |   |
+-------+----------+-------+---+---+
 
 
 
+---+---+
|   |   |
+---+---+
 
 
 
+----------------------------------+-------+----------+--------+---+---+
|   spironolactone (ALDACTONE)     | Given | 02/10/20 | 100 mg |   |   |
| tablet 100 mg  100 mg, Oral,     |       | 20  8:04 |        |   |   |
| DAILY, First dose on 20  |       |  AM PST  |        |   |   |
| at 1700, Hazardous: Use          |       |          |        |   |   |
| appropriate handling             |       |          |        |   |   |
 
| precautions.,                    |       |          |        |   |   |
+----------------------------------+-------+----------+--------+---+---+
 
 
 
+-------+----------+--------+---+---+
| Given | 20 | 100 mg |   |   |
|       | 20 10:00 |        |   |   |
|       |  AM PST  |        |   |   |
+-------+----------+--------+---+---+
| Given | 20 | 100 mg |   |   |
|       | 20  9:19 |        |   |   |
|       |  AM PST  |        |   |   |
+-------+----------+--------+---+---+
 
 
 
+---+---+
|   |   |
+---+---+
 
 
 
+-----------------------------------+-------+----------+-----+---+---+
|   sucralfate (CARAFATE) tablet 1  | Given | 02/10/20 | 1 g |   |   |
| g  1 g, Oral, 4 TIMES DAILY,      |       | 20 12:21 |     |   |   |
| First dose on 20 at 2100, |       |  PM PST  |     |   |   |
|  For administration down a        |       |          |     |   |   |
| nasogastric tube, place tablet in |       |          |     |   |   |
|  an empty 60mL oral or catheter   |       |          |     |   |   |
| tip syringe followed by the       |       |          |     |   |   |
| drawing up of 20mL of water. The  |       |          |     |   |   |
| syringe should then be allowed to |       |          |     |   |   |
|  stand with tip up and cap        |       |          |     |   |   |
| removed for approximately 5       |       |          |     |   |   |
| minutes to allow for              |       |          |     |   |   |
| disintegration of the tablet,     |       |          |     |   |   |
| after which the suspension can be |       |          |     |   |   |
|  directly administered into the   |       |          |     |   |   |
| NG tube from the syringe.,        |       |          |     |   |   |
+-----------------------------------+-------+----------+-----+---+---+
 
 
 
+-------+----------+-----+---+---+
| Given | 02/10/20 | 1 g |   |   |
|       | 20  8:04 |     |   |   |
|       |  AM PST  |     |   |   |
+-------+----------+-----+---+---+
| Given | 20 | 1 g |   |   |
|       | 20  8:21 |     |   |   |
|       |  PM PST  |     |   |   |
+-------+----------+-----+---+---+
 
 
 
+---+---+
|   |   |
+---+---+
 
 
 
 
+-----------------------------------+-------+----------+--------+---+---+
|   tamsulosin (FLOMAX) capsule 0.8 | Given | 02/10/20 | 0.8 mg |   |   |
|  mg  0.8 mg, Oral, DAILY AFTER    |       | 20  8:03 |        |   |   |
| BREAKFAST, First dose on Thu      |       |  AM PST  |        |   |   |
| 20 at 0900, Do not crush or   |       |          |        |   |   |
| chew capsule. If unable to        |       |          |        |   |   |
| swallow, may open capsule and     |       |          |        |   |   |
| sprinkle over acidic soft food    |       |          |        |   |   |
| (applesauce, yogurt) or in a      |       |          |        |   |   |
| small quantity of acidic fruit    |       |          |        |   |   |
| juice (orange, grape). Administer |       |          |        |   |   |
|  immediately (do not allow        |       |          |        |   |   |
| granules to dissolve). Do not     |       |          |        |   |   |
| administer via tube routes.,      |       |          |        |   |   |
+-----------------------------------+-------+----------+--------+---+---+
 
 
 
+-------+----------+--------+---+---+
| Given | 20 | 0.8 mg |   |   |
|       | 20  9:59 |        |   |   |
|       |  AM PST  |        |   |   |
+-------+----------+--------+---+---+
| Given | 20 | 0.8 mg |   |   |
|       | 20  9:19 |        |   |   |
|       |  AM PST  |        |   |   |
+-------+----------+--------+---+---+
 
 
 
+---+---+
|   |   |
+---+---+
 
 
 
+-----------------------------------+-------+----------+----------+---+---+
|   technetium TC-99M sestamibi     | Given | 20 | 10.61    |   |   |
| (CARDIOLITE) injection 1061      |       | 20  8:58 | millicur |   |   |
| millicurie  10.61 millicurie,     |       |  AM PST  | ies      |   |   |
| Intravenous, ONCE PRN, Other,     |       |          |          |   |   |
| Starting Thu 20 at 0858, For  |       |          |          |   |   |
| 1 dose, Nuclear Medicine          |       |          |          |   |   |
+-----------------------------------+-------+----------+----------+---+---+
 
 
 
+---+---+
|   |   |
+---+---+
 
 
 
+-----------------------------------+-------+----------+----------+---+---+
|   technetium TC-99M sestamibi     | Given | 20 | 31.2     |   |   |
| (CARDIOLITE) injection 31.2       |       | 20 11:39 | millicur |   |   |
| millicurie  31.2 millicurie,      |       |  AM PST  | ies      |   |   |
| Intravenous, ONCE PRN, Other,     |       |          |          |   |   |
 
| Starting Thu 20 at 1138, For  |       |          |          |   |   |
| 1 dose, Nuclear Medicine          |       |          |          |   |   |
+-----------------------------------+-------+----------+----------+---+---+
 
 
 
+---+---+
|   |   |
+---+---+
 
 
 
+-----------------------------------+-------+----------+-------+---+---+
|   traMADol (ULTRAM) tablet 50 mg  | Given | 02/10/20 | 50 mg |   |   |
|  50 mg, Oral, EVERY 6 HOURS PRN,  |       | 20  4:25 |       |   |   |
| Pain, Starting Wed 2/5/20 at 2023 |       |  AM PST  |       |   |   |
+-----------------------------------+-------+----------+-------+---+---+
 
 
 
+-------+----------+-------+---+---+
| Given | 20 | 50 mg |   |   |
|       | 20  8:19 |       |   |   |
|       |  PM PST  |       |   |   |
+-------+----------+-------+---+---+
| Given | 20 | 50 mg |   |   |
|       | 20  4:07 |       |   |   |
|       |  AM PST  |       |   |   |
+-------+----------+-------+---+---+
 
 
 
+---+---+
|   |   |
+---+---+
 
 
 
+-----------------------------------+-------+----------+--------+---+---+
|   traZODone (DESYREL) tablet 100  | Given | 20 | 100 mg |   |   |
| mg  100 mg, Oral, NIGHTLY, First  |       | 20  8:19 |        |   |   |
| dose on 20 at 2100        |       |  PM PST  |        |   |   |
+-----------------------------------+-------+----------+--------+---+---+
 
 
 
+-------+----------+--------+---+---+
| Given | 20 | 100 mg |   |   |
|       | 20  8:51 |        |   |   |
|       |  PM PST  |        |   |   |
+-------+----------+--------+---+---+
| Given | 20 | 100 mg |   |   |
|       | 20  9:22 |        |   |   |
|       |  PM PST  |        |   |   |
+-------+----------+--------+---+---+
 
 
 
+---+---+
|   |   |
 
+---+---+
 documented in this encounter
 
 Additional Health Concerns
 
 
+-----------------+------------+----------------+---------------------+
| Infection       | Onset Date | Last Indicated | Resolved Time       |
+-----------------+------------+----------------+---------------------+
| Rule out        | 2020 | 2020     | 2020  8:52 PM |
| Gastroenteritis |            |                |  PST                |
+-----------------+------------+----------------+---------------------+
| Rule out C.     | 2020 | 2020     | 2020  4:57 PM |
| Difficile       |            |                |  PST                |
+-----------------+------------+----------------+---------------------+
 documented as of this encounter

## 2020-07-06 NOTE — XMS
PreManage Notification: ZEUS GRANADO MRN:[id number]
 
Security Information
 
Security Events
No recent Security Events currently on file
 
 
 
CRITERIA MET
------------
- [facility] - Has Care Guidelines
- History of Sepsis Dx
- PDMP
 
 
CARE PROVIDERS
-------------------------------------------------------------------------------------
Name Unknown     Skilled Nursing Facility     Current
 
PHONE: [phone]
-------------------------------------------------------------------------------------
KARI PERRY     Physician Assistant     Current
 
PHONE: Unknown
-------------------------------------------------------------------------------------
LORE WELCH     [facility]     09/27/2018-Current
 
PHONE: [phone]
-------------------------------------------------------------------------------------
Ynes Coburn     /Care Coordinator     06/01/2020-Current
 
PHONE: [phone]
-------------------------------------------------------------------------------------
 
Guidelines Source: [employer] [place]
Guidelines Date: 04/15/2020
 
Care Coordination:
    Not engaged in mental health services through [facility]. Please contact [facility] 
    for any mental health concerns.\T\nbsp;
    Shaka [phone]
    Jenni [phone]
    Crisis Line [phone]
 
Care History
Medical/Surgical
01/27/2020    [facility]
 
      - PATIENT CURRENTLY RECEIVING HOME HEALTH SERVICES- PATRICE- PATIENT 
      NEEDS A PCP TO CONTINUE WITH HOME HEALTH ORDERS
      - [facility] CONTACTED [facility]- PATIENT HAS 15 1/2 HOURS A WEEK OF 
      CAREGIVING SERVICES IN THE HOME- PATIENT HAS BEHAVIORS TOWARDS CAREGIVERS IN
      THE HOME. [facility] MIGHT PULL OUT SERVICES FROM PATIENT IF IT
      CONTINUES. PATIENT IS NON COMPLIANT WITH MEDICATIONS AND HAS RANDOM
      INDIVIDUALS WHO LIVE IN THE HOME. PATIENT DAUGHTER NO LONGER LIVES IN THE HOME.
      
      - [facility]-APS REFERRAL HAS BEEN MADE DUE TO RECENT ER VISITS -CHW RECOMMENDED 
      FOLLOW UP- APS WORKER ALLISON FREEDMAN.
 
      - LADONNA PAIGE- PATIENT [facility] CURRENT  
      - PATIENT HAS NO SHOWED TO APTS AT THE CLINIC TO ESTABLISH CARE- DOES NOT HAVE 
      A PCP
E.DTi VISIT COUNT (12 MO.)
-------------------------------------------------------------------------------------
8 [facility]
-------------------------------------------------------------------------------------
TOTAL 8
-------------------------------------------------------------------------------------
NOTE: Visits indicate total known visits.
 
ED/UCC VISIT TRACKING (12 MO.)
-------------------------------------------------------------------------------------
07/06/2020 11:10
[facility]
 
TYPE: Emergency
 
 
-------------------------------------------------------------------------------------
04/19/2020 15:12
[facility] [place]
 
TYPE: Emergency
 
COMPLAINT:
- WEAKNESS
 
DIAGNOSES:
- Essential (primary) hypertension
- Anemia in chronic kidney disease
- Chronic kidney disease, unspecified
- Liver disease, unspecified
- Weakness
- Other ascites
- Personal history of nicotine dependence
-------------------------------------------------------------------------------------
04/14/2020 21:17
[facility]
 
TYPE: Emergency
 
COMPLAINT:
- ALTERED MENTAL STATUS
-------------------------------------------------------------------------------------
02/05/2020 12:34
[facility]
 
TYPE: Emergency
 
COMPLAINT:
- LIVER FAILURE
 
DIAGNOSES:
- Weakness
- Long term (current) use of insulin
- Other fatigue
- Personal history of nicotine dependence
- Other long term (current) drug therapy
- Essential (primary) hypertension
 
- Long term (current) use of aspirin
- Other malaise
- Other specified abnormal findings of blood chemistry
-------------------------------------------------------------------------------------
01/23/2020 18:26
[facility]
 
TYPE: Emergency
 
COMPLAINT:
- WEAKNESS
 
DIAGNOSES:
- Essential (primary) hypertension
- Type 2 diabetes mellitus with hyperglycemia
- Other long term (current) drug therapy
- Personal history of nicotine dependence
- Long term (current) use of aspirin
-------------------------------------------------------------------------------------
01/04/2020 16:37
[facility]
 
TYPE: Emergency
 
COMPLAINT:
- FALL
 
DIAGNOSES:
- Other long term (current) drug therapy
- Unspecified cirrhosis of liver
- Other chest pain
- Multiple fractures of ribs, right side, initial encounter for
- Pleural effusion, not elsewhere classified
- Long term (current) use of aspirin
- Fall from or off toilet with subsequent striking against obje
- Hyperglycemia, unspecified
- Personal history of nicotine dependence
- Essential (primary) hypertension
- Long term (current) use of insulin
-------------------------------------------------------------------------------------
12/03/2019 11:21
[facility]
 
TYPE: Emergency
 
COMPLAINT:
- VOMITING
-------------------------------------------------------------------------------------
10/16/2019 13:59
[facility]
 
TYPE: Emergency
 
COMPLAINT:
- WEAKNESS
-------------------------------------------------------------------------------------
 
 
INPATIENT VISIT TRACKING (12 MO.)
-------------------------------------------------------------------------------------
 
04/15/2020 00:26
[facility]
 
TYPE: Medical Surgical
 
COMPLAINT:
- HHS, ALTERED MENTAL STATUS
 
DIAGNOSES:
- Other ascites
- Hypertensive chronic kidney disease with stage 1 through stag
- Patient's other noncompliance with medication regimen
- Long term (current) use of aspirin
- Urinary tract infection, site not specified
- Long term (current) use of opiate analgesic
- Unspecified place in hospital as the place of occurrence of t
- Benign prostatic hyperplasia without lower urinary tract symp
- Unspecified Escherichia coli [E. coli] as the cause of diseas
- Type 2 diabetes mellitus with diabetic polyneuropathy
- Hypo-osmolality and hyponatremia
- Acute posthemorrhagic anemia
- Presence of prosthetic heart valve
- Toxic encephalopathy
- Other long term (current) drug therapy
- Type 2 diabetes mellitus with hyperosmolarity without nonketo
- Chronic or unspecified gastric ulcer with hemorrhage
- Disorder of kidney and ureter, unspecified
- Type 2 diabetes mellitus with hyperglycemia
- Unspecified mood [affective] disorder
- Adverse effect of other opioids, initial encounter
- Type 2 diabetes mellitus with diabetic chronic kidney disease
- Long term (current) use of insulin
- Atrioventricular block, complete
- Presence of cardiac pacemaker
- Hyperlipidemia, unspecified
- Unspecified viral hepatitis C without hepatic coma
- Chronic kidney disease, unspecified
- Cough
- Other cirrhosis of liver
-------------------------------------------------------------------------------------
[facility]
 
DIAGNOSES:
- Other specified abnormal findings of blood chemistry
- Long term (current) use of insulin
- Other ascites
- Diarrhea, unspecified
- Unspecified cirrhosis of liver
- Essential (primary) hypertension
- Other pancytopenia
- Type 2 diabetes mellitus without complications
- Other malaise
- Hyperlipidemia, unspecified
- Acute ischemic heart disease, unspecified
-------------------------------------------------------------------------------------
12/03/2019 17:31
[facility]
 
 
TYPE: Medical Surgical
 
COMPLAINT:
- HEMATEMESIS
 
DIAGNOSES:
- Long term (current) use of aspirin
- Postpolio syndrome
- Type 2 diabetes mellitus with hyperglycemia
- Myocardial infarction type 2
- Atrioventricular block, complete
- Non-pressure chronic ulcer of left heel and midfoot limited t
- Presence of cardiac pacemaker
- Presence of prosthetic heart valve
- Other nonspecific abnormal finding of lung field
- Gastrointestinal hemorrhage, unspecified
- Type 2 diabetes mellitus with diabetic polyneuropathy
- Chronic or unspecified gastric ulcer with hemorrhage
- Other long term (current) drug therapy
- Other pancytopenia
- Retention of urine, unspecified
- Dependence on wheelchair
- Unspecified viral hepatitis C without hepatic coma
- Type 2 diabetes mellitus with foot ulcer
- Essential (primary) hypertension
- Other stimulant abuse, uncomplicated
- Unspecified cirrhosis of liver
- Toxic encephalopathy
- Long term (current) use of insulin
- Esophagitis, unspecified
- Acute posthemorrhagic anemia
- Unspecified chronic gastritis without bleeding
- Hyperlipidemia, unspecified
-------------------------------------------------------------------------------------
10/16/2019 16:59
[facility] [place]
 
TYPE: Medical Surgical
 
COMPLAINT:
- HYPERGLYCEMIC CRISIS
 
DIAGNOSES:
- Other long term (current) drug therapy
- Chronic viral hepatitis C
- Dependence on wheelchair
- Type 2 diabetes mellitus with hyperosmolarity without nonketo
- Presence of prosthetic heart valve
- Viral intestinal infection, unspecified
- Chronic viral hepatitis C
- Viral intestinal infection, unspecified
- Unspecified cirrhosis of liver
- Essential (primary) hypertension
- Type 2 diabetes mellitus with diabetic polyneuropathy
- Type 2 diabetes mellitus with hyperglycemia
- Unspecified cirrhosis of liver
- Presence of cardiac pacemaker
- Presence of prosthetic heart valve
- Patient's noncompliance with other medical treatment and lilliam
 
- Long term (current) use of aspirin
- Hyperkalemia
- Other long term (current) drug therapy
- Essential (primary) hypertension
- Dependence on wheelchair
- Type 2 diabetes mellitus with hyperosmolarity without nonketo
- Presence of cardiac pacemaker
- Long term (current) use of insulin
- Long term (current) use of aspirin
- Long term (current) use of insulin
- Patient's noncompliance with other medical treatment and lilliam
- Other pancytopenia
- Other pancytopenia
- Hyperkalemia
- Type 2 diabetes mellitus with diabetic polyneuropathy
-------------------------------------------------------------------------------------
 
[email or web address]

## 2020-09-22 NOTE — NUR
PATIENT RESTING IN BED. VITAL SIGNS AND I&O DONE. CALL LIGHT WITHIN REACH. NO
OTHER NEEDS AT THIS TIME independent/to scoot toward EOB

## 2021-08-06 NOTE — NUR
Called pt back to discuss labs. Advised that no potassium would be ordered at this time. Pt requests new CMP lab due to excessive alcohol use.   Order pended.    pt RESTLESS, 2PA PIVOT TO BSC. NO VOID OR BM. BACK TO BED. GIVEN TV REMOTE AND
WARM BLANKET. CURTAIN OPEN TO NURSES STATION.

## 2023-01-01 NOTE — NUR
WOUND CARE COMPLETED TO LEFT HEEL AND UMBILICAL WOUNDS PER ORDERS. PT AWAITING
DICHARGE INSTRUCTIONS. Patient states no history

## 2024-03-20 NOTE — NUR
Discussed with pt he needs a responsible adult with him if he is using his insurance transportation after his colonoscopy.   PT REPORTED NAUSEA AFTER EATING A COUPLE BITES OF DINNER.  PT STATED HE DID
NOT FEEL SICK PRIOR TO DINNER. GAVE 4MG IV ZOFRAN.  PT REQUESTED JELLO RATHER
THAN THAN HIS DINNER, GIVEN.  JELLO AND ZOFRAN EFFECTIVE.  NO ACTUAL EMISIS,
JUST SPITTING AND GAGGING.

## 2025-03-06 NOTE — NUR
PT SPOT CHECKED, O2 SAT 50'S PER MONITOR. PT DROWSY BUT AWAKENS EASILY. SKIN
PINK IN COLOR, WARM TO THE TOUCH. PT PLACED ON 5LNC FOR A SHORT TIME THEN
TITRATED TO 2LNC. O2 SAT MAINTAINING ABOVE 90. RT AARON CALLED TO ROOM. LUNG
SOUNDS CLEAR, DIMINISHED ON LEFT SIDE. NO DISTRESS NOTED. WHEN ASKED IF PT HAS
SOB PT STATES, "YEAH A LITTLE...WELL I DON'T KNOW". VSS, RR WNL. FOR ENSURE
ACCURACY, EAR PROBE IN PLACE, PT MAINTAINING O2 SAT ABOVE 90 ON 2LNC. WILL
MONITOR. Never